# Patient Record
Sex: FEMALE | Race: WHITE | Employment: OTHER | URBAN - NONMETROPOLITAN AREA
[De-identification: names, ages, dates, MRNs, and addresses within clinical notes are randomized per-mention and may not be internally consistent; named-entity substitution may affect disease eponyms.]

---

## 2017-02-27 RX ORDER — LEVOTHYROXINE SODIUM 0.05 MG/1
TABLET ORAL
Qty: 90 TABLET | Refills: 2 | Status: SHIPPED | OUTPATIENT
Start: 2017-02-27 | End: 2017-12-01 | Stop reason: SDUPTHER

## 2017-02-27 RX ORDER — PANTOPRAZOLE SODIUM 40 MG/1
TABLET, DELAYED RELEASE ORAL
Qty: 90 TABLET | Refills: 2 | Status: SHIPPED | OUTPATIENT
Start: 2017-02-27 | End: 2017-03-30 | Stop reason: CLARIF

## 2017-03-30 ENCOUNTER — OFFICE VISIT (OUTPATIENT)
Dept: PRIMARY CARE CLINIC | Age: 72
End: 2017-03-30
Payer: MEDICARE

## 2017-03-30 VITALS
RESPIRATION RATE: 20 BRPM | DIASTOLIC BLOOD PRESSURE: 72 MMHG | BODY MASS INDEX: 33.16 KG/M2 | TEMPERATURE: 96.8 F | SYSTOLIC BLOOD PRESSURE: 126 MMHG | HEART RATE: 76 BPM | WEIGHT: 227.8 LBS

## 2017-03-30 DIAGNOSIS — Z11.59 NEED FOR HEPATITIS C SCREENING TEST: ICD-10-CM

## 2017-03-30 DIAGNOSIS — E03.9 ACQUIRED HYPOTHYROIDISM: Primary | ICD-10-CM

## 2017-03-30 DIAGNOSIS — G62.9 POLYNEUROPATHY: ICD-10-CM

## 2017-03-30 LAB
BASOPHILS ABSOLUTE: 0.1 K/UL (ref 0–0.2)
BASOPHILS RELATIVE PERCENT: 1 % (ref 0–1)
EOSINOPHILS ABSOLUTE: 0.3 K/UL (ref 0–0.6)
EOSINOPHILS RELATIVE PERCENT: 5.8 % (ref 0–5)
HCT VFR BLD CALC: 43.6 % (ref 37–47)
HEMOGLOBIN: 14.6 G/DL (ref 12–16)
LYMPHOCYTES ABSOLUTE: 2 K/UL (ref 1.1–4.5)
LYMPHOCYTES RELATIVE PERCENT: 33.4 % (ref 20–40)
MCH RBC QN AUTO: 31.4 PG (ref 27–31)
MCHC RBC AUTO-ENTMCNC: 33.5 G/DL (ref 33–37)
MCV RBC AUTO: 93.8 FL (ref 81–99)
MONOCYTES ABSOLUTE: 0.5 K/UL (ref 0–0.9)
MONOCYTES RELATIVE PERCENT: 7.6 % (ref 0–10)
NEUTROPHILS ABSOLUTE: 3.1 K/UL (ref 1.5–7.5)
NEUTROPHILS RELATIVE PERCENT: 52.2 % (ref 50–65)
PDW BLD-RTO: 12.7 % (ref 11.5–14.5)
PLATELET # BLD: 207 K/UL (ref 130–400)
PMV BLD AUTO: 11.6 FL (ref 7.4–10.4)
RBC # BLD: 4.65 M/UL (ref 4.2–5.4)
VITAMIN B-12: 592 PG/ML (ref 211–946)
WBC # BLD: 5.9 K/UL (ref 4.8–10.8)

## 2017-03-30 PROCEDURE — 36415 COLL VENOUS BLD VENIPUNCTURE: CPT | Performed by: FAMILY MEDICINE

## 2017-03-30 PROCEDURE — 99214 OFFICE O/P EST MOD 30 MIN: CPT | Performed by: FAMILY MEDICINE

## 2017-03-30 RX ORDER — GABAPENTIN 100 MG/1
100 CAPSULE ORAL NIGHTLY
Qty: 90 CAPSULE | Refills: 3 | Status: SHIPPED | OUTPATIENT
Start: 2017-03-30 | End: 2017-03-30 | Stop reason: SDUPTHER

## 2017-03-30 RX ORDER — GABAPENTIN 100 MG/1
100 CAPSULE ORAL NIGHTLY
Qty: 90 CAPSULE | Refills: 3 | Status: SHIPPED | OUTPATIENT
Start: 2017-03-30 | End: 2017-04-13

## 2017-03-30 ASSESSMENT — ENCOUNTER SYMPTOMS
EYE ITCHING: 0
SINUS PRESSURE: 0
EYE DISCHARGE: 0
EYE REDNESS: 0
COUGH: 0
RHINORRHEA: 0
ABDOMINAL PAIN: 0
BACK PAIN: 1
DIARRHEA: 0
NAUSEA: 0
VOMITING: 0
WHEEZING: 0
COLOR CHANGE: 0
CHEST TIGHTNESS: 0

## 2017-03-31 LAB
ALBUMIN SERPL-MCNC: 4.5 G/DL (ref 3.5–5.2)
ALP BLD-CCNC: 82 U/L (ref 35–104)
ALT SERPL-CCNC: 14 U/L (ref 5–33)
ANION GAP SERPL CALCULATED.3IONS-SCNC: 18 MMOL/L (ref 7–19)
AST SERPL-CCNC: 16 U/L (ref 5–32)
BILIRUB SERPL-MCNC: 0.7 MG/DL (ref 0.2–1.2)
BUN BLDV-MCNC: 16 MG/DL (ref 8–23)
CALCIUM SERPL-MCNC: 9.6 MG/DL (ref 8.8–10.2)
CHLORIDE BLD-SCNC: 103 MMOL/L (ref 98–111)
CO2: 23 MMOL/L (ref 22–29)
CREAT SERPL-MCNC: 0.9 MG/DL (ref 0.5–0.9)
GFR NON-AFRICAN AMERICAN: >60
GLOBULIN: 3.1 G/DL
GLUCOSE BLD-MCNC: 93 MG/DL (ref 74–109)
HEPATITIS C ANTIBODY INTERPRETATION: NORMAL
POTASSIUM SERPL-SCNC: 4.5 MMOL/L (ref 3.5–5)
SODIUM BLD-SCNC: 144 MMOL/L (ref 136–145)
T4 FREE: 1.6 NG/ML (ref 0.9–1.7)
TOTAL PROTEIN: 7.6 G/DL (ref 6.6–8.7)
TSH SERPL DL<=0.05 MIU/L-ACNC: 1.19 UIU/ML (ref 0.27–4.2)

## 2017-04-13 ENCOUNTER — HOSPITAL ENCOUNTER (OUTPATIENT)
Age: 72
Setting detail: SPECIMEN
Discharge: HOME | End: 2017-04-13
Payer: MEDICARE

## 2017-04-13 ENCOUNTER — OFFICE VISIT (OUTPATIENT)
Dept: PRIMARY CARE CLINIC | Age: 72
End: 2017-04-13
Payer: MEDICARE

## 2017-04-13 VITALS
WEIGHT: 227.25 LBS | OXYGEN SATURATION: 96 % | TEMPERATURE: 98 F | SYSTOLIC BLOOD PRESSURE: 120 MMHG | HEIGHT: 70 IN | DIASTOLIC BLOOD PRESSURE: 78 MMHG | HEART RATE: 62 BPM | BODY MASS INDEX: 32.53 KG/M2

## 2017-04-13 DIAGNOSIS — D48.9 NEOPLASM OF UNCERTAIN BEHAVIOR: Primary | ICD-10-CM

## 2017-04-13 DIAGNOSIS — L02.419 ABSCESS OF LEG: ICD-10-CM

## 2017-04-13 DIAGNOSIS — M50.20 HERNIATED CERVICAL DISC: ICD-10-CM

## 2017-04-13 DIAGNOSIS — M54.2 NECK PAIN: ICD-10-CM

## 2017-04-13 DIAGNOSIS — R20.0 ARM NUMBNESS: ICD-10-CM

## 2017-04-13 PROCEDURE — 99214 OFFICE O/P EST MOD 30 MIN: CPT | Performed by: FAMILY MEDICINE

## 2017-04-13 PROCEDURE — 11100 PR BIOPSY OF SKIN LESION: CPT | Performed by: FAMILY MEDICINE

## 2017-04-13 PROCEDURE — 88305 TISSUE EXAM BY PATHOLOGIST: CPT

## 2017-04-13 RX ORDER — ACETAMINOPHEN AND CODEINE PHOSPHATE 300; 30 MG/1; MG/1
1 TABLET ORAL EVERY 8 HOURS PRN
Qty: 45 TABLET | Refills: 0 | Status: SHIPPED | OUTPATIENT
Start: 2017-04-13 | End: 2017-08-08

## 2017-04-13 ASSESSMENT — ENCOUNTER SYMPTOMS
COUGH: 0
EYE DISCHARGE: 0
DIARRHEA: 0
VOMITING: 0
COLOR CHANGE: 0
WHEEZING: 0
BACK PAIN: 1
NAUSEA: 0
ABDOMINAL PAIN: 0

## 2017-04-20 ENCOUNTER — NURSE ONLY (OUTPATIENT)
Dept: PRIMARY CARE CLINIC | Age: 72
End: 2017-04-20

## 2017-04-20 DIAGNOSIS — M54.2 NECK PAIN: Primary | ICD-10-CM

## 2017-04-20 PROCEDURE — 99999 PR OFFICE/OUTPT VISIT,PROCEDURE ONLY: CPT | Performed by: FAMILY MEDICINE

## 2017-04-20 RX ORDER — TRAMADOL HYDROCHLORIDE 50 MG/1
50 TABLET ORAL EVERY 8 HOURS PRN
Qty: 30 TABLET | Refills: 0 | Status: SHIPPED | OUTPATIENT
Start: 2017-04-20 | End: 2017-05-30 | Stop reason: SDUPTHER

## 2017-05-31 RX ORDER — TRAMADOL HYDROCHLORIDE 50 MG/1
50 TABLET ORAL EVERY 8 HOURS PRN
Qty: 30 TABLET | Refills: 0 | Status: SHIPPED | OUTPATIENT
Start: 2017-05-31 | End: 2017-07-19 | Stop reason: SDUPTHER

## 2017-07-19 RX ORDER — TRAMADOL HYDROCHLORIDE 50 MG/1
50 TABLET ORAL EVERY 8 HOURS PRN
Qty: 30 TABLET | Refills: 0 | Status: SHIPPED | OUTPATIENT
Start: 2017-07-19 | End: 2017-07-29

## 2017-08-08 ENCOUNTER — OFFICE VISIT (OUTPATIENT)
Dept: PRIMARY CARE CLINIC | Age: 72
End: 2017-08-08
Payer: MEDICARE

## 2017-08-08 VITALS
HEIGHT: 70 IN | SYSTOLIC BLOOD PRESSURE: 123 MMHG | HEART RATE: 64 BPM | BODY MASS INDEX: 33.18 KG/M2 | TEMPERATURE: 98.1 F | DIASTOLIC BLOOD PRESSURE: 78 MMHG | WEIGHT: 231.8 LBS | RESPIRATION RATE: 16 BRPM

## 2017-08-08 DIAGNOSIS — Z01.419 WELL FEMALE EXAM WITH ROUTINE GYNECOLOGICAL EXAM: Primary | ICD-10-CM

## 2017-08-08 PROCEDURE — 99214 OFFICE O/P EST MOD 30 MIN: CPT | Performed by: FAMILY MEDICINE

## 2017-08-08 RX ORDER — AMITRIPTYLINE HYDROCHLORIDE 25 MG/1
TABLET, FILM COATED ORAL
Qty: 60 TABLET | Refills: 3 | Status: SHIPPED | OUTPATIENT
Start: 2017-08-08 | End: 2018-02-14 | Stop reason: SDUPTHER

## 2017-08-08 RX ORDER — TRAMADOL HYDROCHLORIDE 50 MG/1
50 TABLET ORAL NIGHTLY
COMMUNITY
End: 2018-02-14 | Stop reason: SDUPTHER

## 2017-08-08 ASSESSMENT — PATIENT HEALTH QUESTIONNAIRE - PHQ9
SUM OF ALL RESPONSES TO PHQ QUESTIONS 1-9: 0
SUM OF ALL RESPONSES TO PHQ9 QUESTIONS 1 & 2: 0
2. FEELING DOWN, DEPRESSED OR HOPELESS: 0
1. LITTLE INTEREST OR PLEASURE IN DOING THINGS: 0

## 2017-12-01 RX ORDER — PANTOPRAZOLE SODIUM 40 MG/1
TABLET, DELAYED RELEASE ORAL
Qty: 90 TABLET | Refills: 2 | Status: SHIPPED | OUTPATIENT
Start: 2017-12-01 | End: 2018-08-03 | Stop reason: SDUPTHER

## 2017-12-01 RX ORDER — LEVOTHYROXINE SODIUM 0.05 MG/1
TABLET ORAL
Qty: 90 TABLET | Refills: 2 | Status: SHIPPED | OUTPATIENT
Start: 2017-12-01 | End: 2018-08-03 | Stop reason: SDUPTHER

## 2018-02-14 ENCOUNTER — OFFICE VISIT (OUTPATIENT)
Dept: PRIMARY CARE CLINIC | Age: 73
End: 2018-02-14
Payer: MEDICARE

## 2018-02-14 VITALS
OXYGEN SATURATION: 98 % | WEIGHT: 241 LBS | HEART RATE: 68 BPM | TEMPERATURE: 98 F | HEIGHT: 70 IN | SYSTOLIC BLOOD PRESSURE: 128 MMHG | RESPIRATION RATE: 20 BRPM | DIASTOLIC BLOOD PRESSURE: 88 MMHG | BODY MASS INDEX: 34.5 KG/M2

## 2018-02-14 DIAGNOSIS — G62.9 NEUROPATHY: Primary | ICD-10-CM

## 2018-02-14 DIAGNOSIS — G89.29 CHRONIC BILATERAL LOW BACK PAIN WITH BILATERAL SCIATICA: ICD-10-CM

## 2018-02-14 DIAGNOSIS — M54.41 CHRONIC BILATERAL LOW BACK PAIN WITH BILATERAL SCIATICA: ICD-10-CM

## 2018-02-14 DIAGNOSIS — M54.42 CHRONIC BILATERAL LOW BACK PAIN WITH BILATERAL SCIATICA: ICD-10-CM

## 2018-02-14 PROCEDURE — 1123F ACP DISCUSS/DSCN MKR DOCD: CPT | Performed by: FAMILY MEDICINE

## 2018-02-14 PROCEDURE — 4040F PNEUMOC VAC/ADMIN/RCVD: CPT | Performed by: FAMILY MEDICINE

## 2018-02-14 PROCEDURE — G8417 CALC BMI ABV UP PARAM F/U: HCPCS | Performed by: FAMILY MEDICINE

## 2018-02-14 PROCEDURE — G8484 FLU IMMUNIZE NO ADMIN: HCPCS | Performed by: FAMILY MEDICINE

## 2018-02-14 PROCEDURE — 3014F SCREEN MAMMO DOC REV: CPT | Performed by: FAMILY MEDICINE

## 2018-02-14 PROCEDURE — 1090F PRES/ABSN URINE INCON ASSESS: CPT | Performed by: FAMILY MEDICINE

## 2018-02-14 PROCEDURE — G8399 PT W/DXA RESULTS DOCUMENT: HCPCS | Performed by: FAMILY MEDICINE

## 2018-02-14 PROCEDURE — 99214 OFFICE O/P EST MOD 30 MIN: CPT | Performed by: FAMILY MEDICINE

## 2018-02-14 PROCEDURE — G8427 DOCREV CUR MEDS BY ELIG CLIN: HCPCS | Performed by: FAMILY MEDICINE

## 2018-02-14 PROCEDURE — 3017F COLORECTAL CA SCREEN DOC REV: CPT | Performed by: FAMILY MEDICINE

## 2018-02-14 PROCEDURE — 1036F TOBACCO NON-USER: CPT | Performed by: FAMILY MEDICINE

## 2018-02-14 RX ORDER — AMITRIPTYLINE HYDROCHLORIDE 25 MG/1
TABLET, FILM COATED ORAL
Qty: 60 TABLET | Refills: 3 | Status: SHIPPED | OUTPATIENT
Start: 2018-02-14 | End: 2018-08-06

## 2018-02-14 RX ORDER — TRAMADOL HYDROCHLORIDE 50 MG/1
50 TABLET ORAL EVERY 8 HOURS PRN
Qty: 45 TABLET | Refills: 0 | Status: SHIPPED | OUTPATIENT
Start: 2018-02-14 | End: 2018-03-16

## 2018-02-15 ASSESSMENT — ENCOUNTER SYMPTOMS
EYE DISCHARGE: 0
COUGH: 0
DIARRHEA: 0
WHEEZING: 0
COLOR CHANGE: 0
ABDOMINAL PAIN: 0
VOMITING: 0
BACK PAIN: 1
NAUSEA: 0

## 2018-02-20 ENCOUNTER — TELEPHONE (OUTPATIENT)
Dept: NEUROLOGY | Age: 73
End: 2018-02-20

## 2018-03-21 ENCOUNTER — OFFICE VISIT (OUTPATIENT)
Dept: NEUROLOGY | Age: 73
End: 2018-03-21
Payer: MEDICARE

## 2018-03-21 VITALS
HEIGHT: 69 IN | BODY MASS INDEX: 35.57 KG/M2 | HEART RATE: 72 BPM | SYSTOLIC BLOOD PRESSURE: 117 MMHG | DIASTOLIC BLOOD PRESSURE: 74 MMHG | WEIGHT: 240.13 LBS

## 2018-03-21 DIAGNOSIS — M54.5 LOW BACK PAIN, UNSPECIFIED BACK PAIN LATERALITY, UNSPECIFIED CHRONICITY, WITH SCIATICA PRESENCE UNSPECIFIED: ICD-10-CM

## 2018-03-21 DIAGNOSIS — G62.9 NEUROPATHY: Primary | ICD-10-CM

## 2018-03-21 DIAGNOSIS — G62.9 NEUROPATHY: ICD-10-CM

## 2018-03-21 DIAGNOSIS — E53.9 BURNING FEET SYNDROME: ICD-10-CM

## 2018-03-21 DIAGNOSIS — M54.2 PAIN, NECK: ICD-10-CM

## 2018-03-21 LAB
RHEUMATOID FACTOR: <10 IU/ML
SEDIMENTATION RATE, ERYTHROCYTE: 17 MM/HR (ref 0–25)
VITAMIN B-12: 534 PG/ML (ref 211–946)

## 2018-03-21 PROCEDURE — 1036F TOBACCO NON-USER: CPT | Performed by: PSYCHIATRY & NEUROLOGY

## 2018-03-21 PROCEDURE — G8399 PT W/DXA RESULTS DOCUMENT: HCPCS | Performed by: PSYCHIATRY & NEUROLOGY

## 2018-03-21 PROCEDURE — 99204 OFFICE O/P NEW MOD 45 MIN: CPT | Performed by: PSYCHIATRY & NEUROLOGY

## 2018-03-21 PROCEDURE — 3017F COLORECTAL CA SCREEN DOC REV: CPT | Performed by: PSYCHIATRY & NEUROLOGY

## 2018-03-21 PROCEDURE — 1123F ACP DISCUSS/DSCN MKR DOCD: CPT | Performed by: PSYCHIATRY & NEUROLOGY

## 2018-03-21 PROCEDURE — G8484 FLU IMMUNIZE NO ADMIN: HCPCS | Performed by: PSYCHIATRY & NEUROLOGY

## 2018-03-21 PROCEDURE — 4040F PNEUMOC VAC/ADMIN/RCVD: CPT | Performed by: PSYCHIATRY & NEUROLOGY

## 2018-03-21 PROCEDURE — G8427 DOCREV CUR MEDS BY ELIG CLIN: HCPCS | Performed by: PSYCHIATRY & NEUROLOGY

## 2018-03-21 PROCEDURE — 1090F PRES/ABSN URINE INCON ASSESS: CPT | Performed by: PSYCHIATRY & NEUROLOGY

## 2018-03-21 PROCEDURE — 3014F SCREEN MAMMO DOC REV: CPT | Performed by: PSYCHIATRY & NEUROLOGY

## 2018-03-21 PROCEDURE — G8417 CALC BMI ABV UP PARAM F/U: HCPCS | Performed by: PSYCHIATRY & NEUROLOGY

## 2018-03-21 RX ORDER — TRAMADOL HYDROCHLORIDE 50 MG/1
50 TABLET ORAL EVERY 6 HOURS PRN
COMMUNITY
End: 2018-04-04 | Stop reason: SDUPTHER

## 2018-03-21 RX ORDER — OXCARBAZEPINE 150 MG/1
TABLET, FILM COATED ORAL
Qty: 120 TABLET | Refills: 5 | Status: SHIPPED | OUTPATIENT
Start: 2018-03-21 | End: 2018-08-06

## 2018-03-21 NOTE — PROGRESS NOTES
Chief Complaint   Patient presents with    New Patient     Referred by Dr. Anurag Sandhu for evaluation of neuropathy        Angel Workman is a 74y.o. year old female who is seen for evaluation of Burning feet. This has been present for about 5 years or more. It is typically worse at night when she lays down. She sometimes feels that the nerve endings to her body are not normal. She does have some chronic neck pain issues and some occasional low back pain issues. She denies any weakness. She indicates her mother and diabetes and neuropathy. She denies any exposure to chemicals, toxins, or pesticides. She has tried Cymbalta or amitriptyline in the past. Tramadol does help.      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    Pain, neck 03/21/2018     Resolved Ambulatory Problems     Diagnosis Date Noted    No Resolved Ambulatory Problems     Past Medical History:   Diagnosis Date    Back pain     GERD (gastroesophageal reflux disease)     Hypertension     Hypothyroidism     Xerosis of skin        Past Surgical History:   Procedure Laterality Date    CHOLECYSTECTOMY      COLONOSCOPY      1-09    TUBAL LIGATION         Family History   Problem Relation Age of Onset    Diabetes Mother     High Blood Pressure Mother     Alzheimer's Disease Mother     Alzheimer's Disease Father     Cancer Sister      colon       Allergies   Allergen Reactions    Neurontin [Gabapentin] Hives and Itching       Social History     Social History    Marital status:      Spouse name: N/A    Number of children: 1    Years of education: N/A     Occupational History    retired       Social History Main Topics    facility-administered medications for this visit. /74   Pulse 72   Ht 5' 9\" (1.753 m)   Wt 240 lb 2 oz (108.9 kg)   BMI 35.46 kg/m²     Constitutional  well developed, well nourished. Eyes  conjunctiva normal.  Pupils react to light  Ear, nose, throat -hearing intact to finger rub No scars, masses, or lesions over external nose or ears, no atrophy of tongue  Neck-symmetric, no masses noted, no jugular vein distension  Respiration- chest wall appears symmetric, good expansion,   normal effort without use of accessory muscles  Musculoskeletal  no significant wasting of muscles noted, no bony deformities  Extremities-no clubbing, cyanosis or edema  Skin  warm, dry, and intact. No rash, erythema, or pallor.   Psychiatric  mood, affect, and behavior appear normal.      Neurological exam  Awake, alert, fluent oriented x 3 appropriate affect  Attention and concentration appear appropriate  Recent and remote memory appears unremarkable  Speech normal without dysarthria  No clear issues with language of fund of knowledge    Cranial Nerve Exam   CN II- Visual fields grossly unremarkable  CN III, IV,VI-EOMI, No nystagmus, conjugate eye movements, no ptosis  CN V-sensation intact to LT over face  CN VII-no facial assymetry  CN VIII-Hearing intact to finger rub  CN IX and X- Palate elevates in midline  CN XI-good shoulder shrug  CN XII-Tongue midline with no fasciculations or fibrillations    Motor Exam  V/V throughout upper and lower extremities bilaterally, no cogwheeling, normal tone    Sensory Exam  Sensation intact to light touch and temperature upper and lower extremities bilaterally    Reflexes   2+ biceps bilaterally  2+ brachioradialis  2+patella  0 ankle jerks  No clonus ankles  No Anderson's sign bilateral hands    Tremors- no tremors in hands or head noted    Gait  Normal base and speed  No ataxia    Coordination  Finger to nose-unremarkable    Lab Results   Component Value Date    [id number] 592 03/30/2017     Lab Results   Component Value Date    WBC 5.9 03/30/2017    HGB 14.6 03/30/2017    HCT 43.6 03/30/2017    MCV 93.8 03/30/2017     03/30/2017     Lab Results   Component Value Date     03/30/2017    K 4.5 03/30/2017     03/30/2017    CO2 23 03/30/2017    BUN 16 03/30/2017    CREATININE 0.9 03/30/2017    GLUCOSE 93 03/30/2017    CALCIUM 9.6 03/30/2017    PROT 7.6 03/30/2017    LABALBU 4.5 03/30/2017    BILITOT 0.7 03/30/2017    ALKPHOS 82 03/30/2017    AST 16 03/30/2017    ALT 14 03/30/2017    LABGLOM >60 03/30/2017    GLOB 3.1 03/30/2017           Assessment    ICD-10-CM ICD-9-CM    1. [facility]athy [facility] G62.9 355.9 B. Burgdorferi Antibodies by WB      Celiac Reflex Panel      MONTSERRAT 2 - Anti SSA & Anti SSB      Gliadin Antibody, IgA      Heavy Metal Blood Panel      LYME (B.BURGDORFERI) PCR      Methylmalonic Acid, Serum      Electrophoresis Protein, Serum without Reflex to Immunofixation      Rheumatoid Factor      Sedimentation Rate      Vitamin B12      Nerve Conduction Test with EMG   2. Burning feet syndrome E53.9 266.2 B. Burgdorferi Antibodies by WB      Celiac Reflex Panel      MONTSERRAT 2 - Anti SSA & Anti SSB      Gliadin Antibody, IgA      Heavy Metal Blood Panel      LYME (B.BURGDORFERI) PCR      Methylmalonic Acid, Serum      Electrophoresis Protein, Serum without Reflex to Immunofixation      Rheumatoid Factor      Sedimentation Rate      Vitamin B12      Nerve Conduction Test with EMG   3. Low back pain, unspecified back pain laterality, unspecified chronicity, with sciatica presence unspecified M54.5 724.2 B.  Burgdorferi Antibodies by WB      Celiac Reflex Panel      MONTSERRAT 2 - Anti SSA & Anti SSB      Gliadin Antibody, IgA      Heavy Metal Blood Panel      LYME (B.BURGDORFERI) PCR      Methylmalonic Acid, Serum      Electrophoresis Protein, Serum without Reflex to Immunofixation      Rheumatoid Factor      Sedimentation Rate      Vitamin B12      Nerve Conduction Test with

## 2018-03-23 LAB
GLIADIN ANTIBODIES IGA: 6 UNITS (ref 0–19)
GLIADIN ANTIBODIES IGG: 2 UNITS (ref 0–19)
IGA: 332 MG/DL (ref 68–408)
LYME, EIA: 0.18 LIV (ref 0–1.2)

## 2018-03-24 LAB
ALBUMIN SERPL-MCNC: 4.09 G/DL (ref 3.75–5.01)
ALPHA-1-GLOBULIN: 0.35 G/DL (ref 0.19–0.46)
ALPHA-2-GLOBULIN: 0.79 G/DL (ref 0.48–1.05)
ARSENIC BLOOD: <10 UG/L (ref 0–13)
BETA GLOBULIN: 1.04 G/DL (ref 0.48–1.1)
ENA TO SSB (LA) ANTIBODY: 0 AU/ML (ref 0–40)
GAMMA GLOBULIN: 1.24 G/DL (ref 0.62–1.51)
LEAD LEVEL BLOOD: <2 UG/DL (ref 0–4.9)
MERCURY BLOOD: <3 UG/L (ref 0–10)
PROTEIN ELECTROPHORESIS, SERUM: NORMAL
SPE/IFE INTERPRETATION: NORMAL
SSA 52 (RO) (ENA) AB, IGG: 6 AU/ML (ref 0–40)
SSA 60 (RO) (ENA) AB, IGG: 1 AU/ML (ref 0–40)
TISSUE TRANSGLUTAMINASE IGA: 0 U/ML (ref 0–3)
TOTAL PROTEIN: 7.5 G/DL (ref 6–8.3)

## 2018-03-25 LAB — METHYLMALONIC ACID: 0.24 UMOL/L (ref 0–0.4)

## 2018-04-02 ENCOUNTER — HOSPITAL ENCOUNTER (OUTPATIENT)
Dept: NEUROLOGY | Age: 73
Discharge: HOME | End: 2018-04-02
Payer: MEDICARE

## 2018-04-02 DIAGNOSIS — G62.9 NEUROPATHY: ICD-10-CM

## 2018-04-02 DIAGNOSIS — E53.9 BURNING FEET SYNDROME: ICD-10-CM

## 2018-04-02 DIAGNOSIS — M54.5 LOW BACK PAIN, UNSPECIFIED BACK PAIN LATERALITY, UNSPECIFIED CHRONICITY, WITH SCIATICA PRESENCE UNSPECIFIED: ICD-10-CM

## 2018-04-02 PROCEDURE — 95886 MUSC TEST DONE W/N TEST COMP: CPT | Performed by: PSYCHIATRY & NEUROLOGY

## 2018-04-02 PROCEDURE — 95909 NRV CNDJ TST 5-6 STUDIES: CPT

## 2018-04-02 PROCEDURE — 95886 MUSC TEST DONE W/N TEST COMP: CPT

## 2018-04-02 PROCEDURE — 95909 NRV CNDJ TST 5-6 STUDIES: CPT | Performed by: PSYCHIATRY & NEUROLOGY

## 2018-04-04 RX ORDER — TRAMADOL HYDROCHLORIDE 50 MG/1
50 TABLET ORAL EVERY 6 HOURS PRN
Qty: 30 TABLET | Refills: 0 | Status: SHIPPED | OUTPATIENT
Start: 2018-04-04 | End: 2018-05-02 | Stop reason: SDUPTHER

## 2018-05-02 ENCOUNTER — OFFICE VISIT (OUTPATIENT)
Dept: NEUROLOGY | Age: 73
End: 2018-05-02
Payer: MEDICARE

## 2018-05-02 VITALS
HEART RATE: 78 BPM | WEIGHT: 240 LBS | SYSTOLIC BLOOD PRESSURE: 113 MMHG | DIASTOLIC BLOOD PRESSURE: 76 MMHG | BODY MASS INDEX: 35.55 KG/M2 | HEIGHT: 69 IN

## 2018-05-02 DIAGNOSIS — M54.5 LOW BACK PAIN, UNSPECIFIED BACK PAIN LATERALITY, UNSPECIFIED CHRONICITY, WITH SCIATICA PRESENCE UNSPECIFIED: ICD-10-CM

## 2018-05-02 DIAGNOSIS — M54.2 PAIN, NECK: ICD-10-CM

## 2018-05-02 DIAGNOSIS — G62.9 NEUROPATHY: Primary | ICD-10-CM

## 2018-05-02 DIAGNOSIS — E53.9 BURNING FEET SYNDROME: ICD-10-CM

## 2018-05-02 PROCEDURE — G8399 PT W/DXA RESULTS DOCUMENT: HCPCS | Performed by: PSYCHIATRY & NEUROLOGY

## 2018-05-02 PROCEDURE — 1123F ACP DISCUSS/DSCN MKR DOCD: CPT | Performed by: PSYCHIATRY & NEUROLOGY

## 2018-05-02 PROCEDURE — G8417 CALC BMI ABV UP PARAM F/U: HCPCS | Performed by: PSYCHIATRY & NEUROLOGY

## 2018-05-02 PROCEDURE — 4040F PNEUMOC VAC/ADMIN/RCVD: CPT | Performed by: PSYCHIATRY & NEUROLOGY

## 2018-05-02 PROCEDURE — 3017F COLORECTAL CA SCREEN DOC REV: CPT | Performed by: PSYCHIATRY & NEUROLOGY

## 2018-05-02 PROCEDURE — 99214 OFFICE O/P EST MOD 30 MIN: CPT | Performed by: PSYCHIATRY & NEUROLOGY

## 2018-05-02 PROCEDURE — 1036F TOBACCO NON-USER: CPT | Performed by: PSYCHIATRY & NEUROLOGY

## 2018-05-02 PROCEDURE — G8427 DOCREV CUR MEDS BY ELIG CLIN: HCPCS | Performed by: PSYCHIATRY & NEUROLOGY

## 2018-05-02 PROCEDURE — 1090F PRES/ABSN URINE INCON ASSESS: CPT | Performed by: PSYCHIATRY & NEUROLOGY

## 2018-05-02 RX ORDER — GABAPENTIN 300 MG/1
300 CAPSULE ORAL 3 TIMES DAILY
Qty: 90 CAPSULE | Refills: 5 | Status: SHIPPED | OUTPATIENT
Start: 2018-05-02 | End: 2018-08-06

## 2018-05-02 RX ORDER — TRAMADOL HYDROCHLORIDE 50 MG/1
50 TABLET ORAL EVERY 6 HOURS PRN
Qty: 30 TABLET | Refills: 0 | Status: SHIPPED | OUTPATIENT
Start: 2018-05-02 | End: 2018-06-06 | Stop reason: SDUPTHER

## 2018-05-07 RX ORDER — LISINOPRIL 10 MG/1
TABLET ORAL
Qty: 90 TABLET | Refills: 2 | Status: SHIPPED | OUTPATIENT
Start: 2018-05-07 | End: 2019-02-09 | Stop reason: SDUPTHER

## 2018-06-06 DIAGNOSIS — G44.89 OTHER HEADACHE SYNDROME: Primary | ICD-10-CM

## 2018-06-06 RX ORDER — TRAMADOL HYDROCHLORIDE 50 MG/1
50 TABLET ORAL EVERY 6 HOURS PRN
Qty: 30 TABLET | Refills: 0 | Status: SHIPPED | OUTPATIENT
Start: 2018-06-06 | End: 2018-08-06 | Stop reason: SDUPTHER

## 2018-08-06 ENCOUNTER — OFFICE VISIT (OUTPATIENT)
Dept: NEUROLOGY | Age: 73
End: 2018-08-06
Payer: MEDICARE

## 2018-08-06 VITALS
BODY MASS INDEX: 35.55 KG/M2 | HEIGHT: 69 IN | HEART RATE: 63 BPM | DIASTOLIC BLOOD PRESSURE: 75 MMHG | SYSTOLIC BLOOD PRESSURE: 121 MMHG | WEIGHT: 240 LBS

## 2018-08-06 DIAGNOSIS — M54.5 LOW BACK PAIN, UNSPECIFIED BACK PAIN LATERALITY, UNSPECIFIED CHRONICITY, WITH SCIATICA PRESENCE UNSPECIFIED: ICD-10-CM

## 2018-08-06 DIAGNOSIS — G62.9 NEUROPATHY: Primary | ICD-10-CM

## 2018-08-06 DIAGNOSIS — G44.89 OTHER HEADACHE SYNDROME: ICD-10-CM

## 2018-08-06 DIAGNOSIS — M54.2 PAIN, NECK: ICD-10-CM

## 2018-08-06 DIAGNOSIS — E53.9 BURNING FEET SYNDROME: ICD-10-CM

## 2018-08-06 PROCEDURE — 3017F COLORECTAL CA SCREEN DOC REV: CPT | Performed by: PSYCHIATRY & NEUROLOGY

## 2018-08-06 PROCEDURE — 4040F PNEUMOC VAC/ADMIN/RCVD: CPT | Performed by: PSYCHIATRY & NEUROLOGY

## 2018-08-06 PROCEDURE — G8399 PT W/DXA RESULTS DOCUMENT: HCPCS | Performed by: PSYCHIATRY & NEUROLOGY

## 2018-08-06 PROCEDURE — 1036F TOBACCO NON-USER: CPT | Performed by: PSYCHIATRY & NEUROLOGY

## 2018-08-06 PROCEDURE — 1101F PT FALLS ASSESS-DOCD LE1/YR: CPT | Performed by: PSYCHIATRY & NEUROLOGY

## 2018-08-06 PROCEDURE — 99214 OFFICE O/P EST MOD 30 MIN: CPT | Performed by: PSYCHIATRY & NEUROLOGY

## 2018-08-06 PROCEDURE — 1090F PRES/ABSN URINE INCON ASSESS: CPT | Performed by: PSYCHIATRY & NEUROLOGY

## 2018-08-06 PROCEDURE — G8427 DOCREV CUR MEDS BY ELIG CLIN: HCPCS | Performed by: PSYCHIATRY & NEUROLOGY

## 2018-08-06 PROCEDURE — G8417 CALC BMI ABV UP PARAM F/U: HCPCS | Performed by: PSYCHIATRY & NEUROLOGY

## 2018-08-06 PROCEDURE — 1123F ACP DISCUSS/DSCN MKR DOCD: CPT | Performed by: PSYCHIATRY & NEUROLOGY

## 2018-08-06 RX ORDER — TRAMADOL HYDROCHLORIDE 50 MG/1
50 TABLET ORAL EVERY 6 HOURS PRN
Qty: 30 TABLET | Refills: 5 | Status: SHIPPED | OUTPATIENT
Start: 2018-08-06 | End: 2019-02-01 | Stop reason: SDUPTHER

## 2018-08-06 NOTE — PROGRESS NOTES
Chief Complaint   Patient presents with    Follow-up     2 month follow up for neuropathy        Amelie Loya is a 74y.o. year old female who is seen for evaluation of Burning feet. This has been present for about 5 years or more. It is typically worse at night when she lays down. She sometimes feels that the nerve endings to her body are not normal. She does have some chronic neck pain issues and some occasional low back pain issues. She denies any weakness. She indicates her mother and diabetes and neuropathy. She denies any exposure to chemicals, toxins, or pesticides. She has tried Cymbalta or amitriptyline in the past. Tramadol does help. Working more in yard and more back pain. Stretching helps. Did not tolerate trileptal. Going to chiropractor helps neck and back. Gabapentin made her sick. Tramadol still helps.     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/2018    Pain, neck 03/21/2018     Resolved Ambulatory Problems     Diagnosis Date Noted    No Resolved Ambulatory Problems     Past Medical History:   Diagnosis Date    Back pain     GERD (gastroesophageal reflux disease)     Hypertension     Hypothyroidism     Xerosis of skin        Past Surgical History:   Procedure Laterality Date    CHOLECYSTECTOMY      COLONOSCOPY      1-09    TUBAL LIGATION         Family History   Problem Relation Age of Onset    Diabetes Mother     High Blood Pressure Mother     Alzheimer's Disease Mother     Alzheimer's Disease Father     Cancer Sister         colon       Allergies   Allergen Reactions    Neurontin [Gabapentin] Hives and Itching       Social History     Social History    Marital status:      Spouse name: N/A   Venkata Bates

## 2018-08-17 ENCOUNTER — OFFICE VISIT (OUTPATIENT)
Dept: PRIMARY CARE CLINIC | Age: 73
End: 2018-08-17
Payer: MEDICARE

## 2018-08-17 VITALS
HEIGHT: 69 IN | OXYGEN SATURATION: 97 % | WEIGHT: 239 LBS | RESPIRATION RATE: 16 BRPM | DIASTOLIC BLOOD PRESSURE: 84 MMHG | SYSTOLIC BLOOD PRESSURE: 120 MMHG | TEMPERATURE: 97.9 F | HEART RATE: 64 BPM | BODY MASS INDEX: 35.4 KG/M2

## 2018-08-17 DIAGNOSIS — Z00.00 ROUTINE GENERAL MEDICAL EXAMINATION AT A HEALTH CARE FACILITY: Primary | ICD-10-CM

## 2018-08-17 DIAGNOSIS — E03.9 ACQUIRED HYPOTHYROIDISM: ICD-10-CM

## 2018-08-17 DIAGNOSIS — I10 ESSENTIAL HYPERTENSION: ICD-10-CM

## 2018-08-17 DIAGNOSIS — Z12.31 ENCOUNTER FOR SCREENING MAMMOGRAM FOR BREAST CANCER: ICD-10-CM

## 2018-08-17 DIAGNOSIS — E55.9 VITAMIN D DEFICIENCY: ICD-10-CM

## 2018-08-17 LAB
ALBUMIN SERPL-MCNC: 4.4 G/DL (ref 3.5–5.2)
ALP BLD-CCNC: 69 U/L (ref 35–104)
ALT SERPL-CCNC: 19 U/L (ref 5–33)
ANION GAP SERPL CALCULATED.3IONS-SCNC: 16 MMOL/L (ref 7–19)
AST SERPL-CCNC: 22 U/L (ref 5–32)
BILIRUB SERPL-MCNC: 0.6 MG/DL (ref 0.2–1.2)
BUN BLDV-MCNC: 20 MG/DL (ref 8–23)
CALCIUM SERPL-MCNC: 9.7 MG/DL (ref 8.8–10.2)
CHLORIDE BLD-SCNC: 102 MMOL/L (ref 98–111)
CHOLESTEROL, TOTAL: 183 MG/DL (ref 160–199)
CO2: 25 MMOL/L (ref 22–29)
CREAT SERPL-MCNC: 1 MG/DL (ref 0.5–0.9)
GFR NON-AFRICAN AMERICAN: 54
GLUCOSE BLD-MCNC: 86 MG/DL (ref 74–109)
HDLC SERPL-MCNC: 69 MG/DL (ref 65–121)
LDL CHOLESTEROL CALCULATED: 100 MG/DL
POTASSIUM SERPL-SCNC: 4.5 MMOL/L (ref 3.5–5)
SODIUM BLD-SCNC: 143 MMOL/L (ref 136–145)
T4 TOTAL: 11.4 UG/DL (ref 4.5–11.7)
TOTAL PROTEIN: 7.9 G/DL (ref 6.6–8.7)
TRIGL SERPL-MCNC: 69 MG/DL (ref 0–149)
TSH SERPL DL<=0.05 MIU/L-ACNC: 2.05 UIU/ML (ref 0.27–4.2)
VITAMIN D 25-HYDROXY: 38.2 NG/ML

## 2018-08-17 PROCEDURE — 3017F COLORECTAL CA SCREEN DOC REV: CPT | Performed by: FAMILY MEDICINE

## 2018-08-17 PROCEDURE — 1090F PRES/ABSN URINE INCON ASSESS: CPT | Performed by: FAMILY MEDICINE

## 2018-08-17 PROCEDURE — G0439 PPPS, SUBSEQ VISIT: HCPCS | Performed by: FAMILY MEDICINE

## 2018-08-17 PROCEDURE — 99213 OFFICE O/P EST LOW 20 MIN: CPT | Performed by: FAMILY MEDICINE

## 2018-08-17 PROCEDURE — G8417 CALC BMI ABV UP PARAM F/U: HCPCS | Performed by: FAMILY MEDICINE

## 2018-08-17 PROCEDURE — 36415 COLL VENOUS BLD VENIPUNCTURE: CPT | Performed by: FAMILY MEDICINE

## 2018-08-17 PROCEDURE — 1036F TOBACCO NON-USER: CPT | Performed by: FAMILY MEDICINE

## 2018-08-17 PROCEDURE — G8399 PT W/DXA RESULTS DOCUMENT: HCPCS | Performed by: FAMILY MEDICINE

## 2018-08-17 PROCEDURE — 1101F PT FALLS ASSESS-DOCD LE1/YR: CPT | Performed by: FAMILY MEDICINE

## 2018-08-17 PROCEDURE — 1123F ACP DISCUSS/DSCN MKR DOCD: CPT | Performed by: FAMILY MEDICINE

## 2018-08-17 PROCEDURE — G8427 DOCREV CUR MEDS BY ELIG CLIN: HCPCS | Performed by: FAMILY MEDICINE

## 2018-08-17 PROCEDURE — 4040F PNEUMOC VAC/ADMIN/RCVD: CPT | Performed by: FAMILY MEDICINE

## 2018-08-17 ASSESSMENT — PATIENT HEALTH QUESTIONNAIRE - PHQ9
SUM OF ALL RESPONSES TO PHQ QUESTIONS 1-9: 0
SUM OF ALL RESPONSES TO PHQ QUESTIONS 1-9: 0

## 2018-08-17 ASSESSMENT — LIFESTYLE VARIABLES: HOW OFTEN DO YOU HAVE A DRINK CONTAINING ALCOHOL: 0

## 2018-08-17 ASSESSMENT — ANXIETY QUESTIONNAIRES: GAD7 TOTAL SCORE: 0

## 2018-08-17 NOTE — PROGRESS NOTES
Medicare Annual Wellness Visit  Name: Mahamed Bee Date: 2018   MRN: [id number] Sex: Female   Age: 74 y.o. Ethnicity: Non-/Non    : 1945 Race: Esme Shown is here for Medicare AWV (pt is fasting, difficulty sleeping with her back, has seen DR Sahra Wallace she has peripheral neuropathy in her feet has tried several meds but all have made her sick )    Screenings for behavioral, psychosocial and functional/safety risks, and cognitive dysfunction are all negative except as indicated below. These results, as well as other patient data from the [address] form, are documented in Flowsheets linked to this Encounter. Allergies   Allergen Reactions    Neurontin [Gabapentin] Hives and Itching     Prior to Visit Medications    Medication Sig Taking? Authorizing Provider   traMADol (ULTRAM) 50 MG tablet Take 1 tablet by mouth every 6 hours as needed for Pain for up to 30 days. Kb Feldman MD   levothyroxine (SYNTHROID) 50 MCG tablet 1 tab po q day for low thyroid Yes Scot De Luna MD   lisinopril (PRINIVIL;ZESTRIL) 10 MG tablet TAKE 1 TABLET DAILY FOR HIGH BLOOD PRESSURE Yes Scot De Luna MD   esomeprazole (NEXIUM) 40 MG delayed release capsule Take 1 capsule by mouth daily Stop protonix Yes DA Aranda CNP   Vitamin D (CHOLECALCIFEROL) 1000 UNITS CAPS capsule Take 1,000 Units by mouth daily Yes Historical Provider, MD     Past Medical History:   Diagnosis Date    Back pain     GERD (gastroesophageal reflux disease)     Hypertension     Hypothyroidism     thyroid nodule    Xerosis of skin      Past Surgical History:   Procedure Laterality Date    CHOLECYSTECTOMY      COLONOSCOPY          TUBAL LIGATION       Family History   Problem Relation Age of Onset    Diabetes Mother     High Blood Pressure Mother     Alzheimer's Disease Mother     Alzheimer's Disease Father     Cancer Sister         colon       CareTeam (Including outside all of your stairways have a railing or banister?: Yes  Are your doorways, halls and stairs free of clutter?: Yes  Do you always fasten your seatbelt when you are in a car?: Yes  Safety Interventions:  · Home safety tips provided    Personalized Preventive Plan   Current Health Maintenance Status  Immunization History   Administered Date(s) Administered    Pneumococcal 13-valent Conjugate ([id number]) 01/04/2016    Pneumococcal Polysaccharide ([id number]) 01/30/2012    Tdap (Boostrix, Adacel) 12/12/2007        Health Maintenance   Topic Date Due    Shingles Vaccine (1 of 2 - 2 Dose Series) 06/23/1993    DTaP/Tdap/Td vaccine (2 - Td) 12/12/2017    TSH testing  03/30/2018    Potassium monitoring  03/30/2018    Creatinine monitoring  03/30/2018    Flu vaccine (1) 09/01/2018    Colon cancer screen colonoscopy  01/05/2019    Lipid screen  07/27/2021    DEXA (modify frequency per FRAX score)  Completed    Pneumococcal low/med risk  Completed     Recommendations for Preventive Services Due: see orders and patient instructions/AVS.  .   Recommended screening schedule for the next 5-10 years is provided to the patient in written form: see Patient Instructions/AVS.

## 2018-08-17 NOTE — PATIENT INSTRUCTIONS
Personalized Preventive Plan for Major Austin - 8/17/2018  Medicare offers a range of preventive health benefits. Some of the tests and screenings are paid in full while other may be subject to a deductible, co-insurance, and/or copay. Some of these benefits include a comprehensive review of your medical history including lifestyle, illnesses that may run in your family, and various assessments and screenings as appropriate. After reviewing your medical record and screening and assessments performed today your provider may have ordered immunizations, labs, imaging, and/or referrals for you. A list of these orders (if applicable) as well as your Preventive Care list are included within your After Visit Summary for your review. Other Preventive Recommendations:    · A preventive eye exam performed by an eye specialist is recommended every 1-2 years to screen for glaucoma; cataracts, macular degeneration, and other eye disorders. · A preventive dental visit is recommended every 6 months. · Try to get at least 150 minutes of exercise per week or 10,000 steps per day on a pedometer . · Order or download the FREE \"Exercise & Physical Activity: Your Everyday Guide\" from [employer]. Call 2-230.452.7258 or search [employer] online. · You need 2903-9304 mg of calcium and 7197-2182 IU of vitamin D per day. It is possible to meet your calcium requirement with diet alone, but a vitamin D supplement is usually necessary to meet this goal.  · When exposed to the sun, use a sunscreen that protects against both UVA and UVB radiation with an SPF of 30 or greater. Reapply every 2 to 3 hours or after sweating, drying off with a towel, or swimming. · Always wear a seat belt when traveling in a car. Always wear a helmet when riding a bicycle or motorcycle. Heart-Healthy Diet   Sodium, Fat, and Cholesterol Controlled Diet       What Is a Heart Healthy Diet?    A heart-healthy diet is one that limits sodium , certain types of fat , and cholesterol . This type of diet is recommended for:   People with any form of cardiovascular disease (eg, coronary heart disease , peripheral vascular disease , previous heart attack , previous stroke )   People with risk factors for cardiovascular disease, such as high blood pressure , high cholesterol , or diabetes   Anyone who wants to lower their risk of developing cardiovascular disease   Sodium    Sodium is a mineral found in many foods. In general, most people consume much more sodium than they need. Diets high in sodium can increase blood pressure and lead to edema (water retention). On a heart-healthy diet, you should consume no more than 2,300 mg (milligrams) of sodium per dayabout the amount in one teaspoon of table salt. The foods highest in sodium include table salt (about 50% sodium), processed foods, convenience foods, and preserved foods. Cholesterol    Cholesterol is a fat-like, waxy substance in your blood. Our bodies make some cholesterol. It is also found in animal products, with the highest amounts in fatty meat, egg yolks, whole milk, cheese, shellfish, and organ meats. On a heart-healthy diet, you should limit your cholesterol intake to less than 200 mg per day. It is normal and important to have some cholesterol in your bloodstream. But too much cholesterol can cause plaque to build up within your arteries, which can eventually lead to a heart attack or stroke. The two types of cholesterol that are most commonly referred to are:   Low-density lipoprotein (LDL) cholesterol  Also known as bad cholesterol, this is the cholesterol that tends to build up along your arteries. Bad cholesterol levels are increased by eating fats that are saturated or hydrogenated. Optimal level of this cholesterol is less than 100. Over 130 starts to get risky for heart disease.    High-density lipoprotein (HDL) cholesterol  Also known as good example, this would mean 60 grams of fat or less per day. Saturated fat and trans fat in your diet raises your blood cholesterol the most, much more than dietary cholesterol does. For this reason, on a heart-healthy diet, less than 7% of your calories should come from saturated fat and ideally 0% from trans fat. On an 1800-calorie diet, this translates into less than 14 grams of saturated fat per day, leaving 46 grams of fat to come from mono- and polyunsaturated fats.    Food Choices on a Heart Healthy Diet   Food Category   Foods Recommended   Foods to Avoid   Grains   Breads and rolls without salted tops Most dry and cooked cereals Unsalted crackers and breadsticks Low-sodium or homemade breadcrumbs or stuffing All rice and pastas   Breads, rolls, and crackers with salted tops High-fat baked goods (eg, muffins, donuts, pastries) Quick breads, self-rising flour, and biscuit mixes Regular bread crumbs Instant hot cereals Commercially prepared rice, pasta, or stuffing mixes   Vegetables   Most fresh, frozen, and low-sodium canned vegetables Low-sodium and salt-free vegetable juices Canned vegetables if unsalted or rinsed   Regular canned vegetables and juices, including sauerkraut and pickled vegetables Frozen vegetables with sauces Commercially prepared potato and vegetable mixes   Fruits   Most fresh, frozen, and canned fruits All fruit juices   Fruits processed with salt or sodium   Milk   Nonfat or low-fat (1%) milk Nonfat or low-fat yogurt Cottage cheese, low-fat ricotta, cheeses labeled as low-fat and low-sodium   Whole milk Reduced-fat (2%) milk Malted and chocolate milk Full fat yogurt Most cheeses (unless low-fat and low salt) Buttermilk (no more than 1 cup per week)   Meats and Beans   Lean cuts of fresh or frozen beef, veal, lamb, or pork (look for the word loin) Fresh or frozen poultry without the skin Fresh or frozen fish and some shellfish Egg whites and egg substitutes (Limit whole eggs to three per and passages between rooms well lit? Can you reach a lamp without getting out of bed? Are floor surfaces well maintained? Shag rugs, high-pile carpeting, tile floors, and polished wood floors can be particularly slippery. Stairs should always have handrails and be carpeted or fitted with a non-skid tread. Is your telephone easily reachable. Is the cord safely tucked away? Room by Room   According to the Association of Aging, bathrooms and sharri are the two most potentially hazardous rooms in your home. In the Kitchen    Be sure your stove is in proper working order and always make sure burners and the oven are off before you go out or go to sleep. Keep pots on the back burners, turn handles away from the front of the stove, and keep stove clean and free of grease build-up. Kitchen ventilation systems and range exhausts should be working properly. Keep flammable objects such as towels and pot holders away from the cooking area except when in use. Make sure kitchen curtains are tied back. Move cords and appliances away from the sink and hot surfaces. If extension cords are needed, install wiring guides so they do not hang over the sink, range, or working areas. Look for coffee pots, kettles and toaster ovens with automatic shut-offs. Keep a mop handy in the kitchen so you can wipe up spills instantly. You should also have a small fire extinguisher. Arrange your kitchen with frequently used items on lower shelves to avoid the need to stand on a stepstool to reach them. Make sure countertops are well-lit to avoid injuries while cutting and preparing food. In the Bathroom    Use a non-slip mat or decals in the tub and shower, since wet, soapy tile or porcelain surfaces are extremely slippery. Make sure bathroom rugs are non-skid or tape them firmly to the floor. Bathtubs should have at least one, preferably two, grab bars, firmly attached to structural supports in the wall.  (Do not use built-in soap holders or glass shower doors as grab bars.)    Tub seats fitted with non-slip material on the legs allow you to wash sitting down. For people with limited mobility, bathtub transfer benches allow you to slide safely into the tub. Raised toilet seats and toilet safety rails are helpful for those with knee or hip problems. In the [facility]    Make sure you use a nightlight and that the area around your bed is clear of potential obstacles. Be careful with electric blankets and never go to sleep with a heating pad, which can cause serious burns even if on a low setting. Use fire-resistant mattress covers and pillows, and NEVER smoke in bed. Keep a phone next to the bed that is programmed to dial 911 at the push of a button. If you have a chronic condition, you may want to sign on with an automatic call-in service. Typically the system includes a small pendant that connects directly to an emergency medical voice-response system. You should also make arrangements to stay in contact with someonefriend, neighbor, family memberon a regular schedule. Fire Prevention   According to the [employer] (Smoke Alarms for Every) [address] [place] [address], senior citizens are one of the two highest risk groups for death and serious injuries due to residential fires. When cooking, wear short-sleeved items, never a bulky long-sleeved robe. The [facility]'s Safety Checklist for Older Consumers emphasizes the importance of checking basements, garages, workshops and storage areas for fire hazards, such as volatile liquids, piles of old rags or clothing and overloaded circuits. Never smoke in bed or when lying down on a couch or recliner chair. Small portable electric or kerosene heaters are responsible for many home fires and should be used cautiously if at all. If you do use one, be sure to keep them away from flammable materials.     In case of fire, make sure you have a pre-established Buddhism practices performed if you become very ill? If you have a choice at the end of your life, where would you prefer to die? At home? In a hospital or nursing home? Somewhere else? Would you prefer to be buried or cremated? Do you want your organs to be donated after you die? What should you do with your living will? Make sure that your family members and your health care agent have copies of your living will. Give your doctor a copy of your living will to keep in your medical record. If you have more than one doctor, make sure that each one has a copy. You may want to put a copy of your living will where it can be easily found. Where can you learn more? Go to https://[employer]pepiceweb."[email or web address]. org and sign in to your [employer] account. Enter X029 in the [employer] box to learn more about \"Learning About Living Lan Shilpa. \"     If you do not have an account, please click on the \"Sign Up Now\" link. Current as of: October 6, 2017  Content Version: 11.7  © 1602-0176 [employer]. Care instructions adapted under license by [facility] ([facility]). If you have questions about a medical condition or this instruction, always ask your healthcare professional. [place] [address] any warranty or liability for your use of this information.   ·

## 2018-08-20 NOTE — PROGRESS NOTES
preventive dental visit is recommended every 6 months. · Try to get at least 150 minutes of exercise per week or 10,000 steps per day on a pedometer . · Order or download the FREE \"Exercise & Physical Activity: Your Everyday Guide\" from [employer]. Call 4-412.485.3613 or search [employer] online. · You need 5398-2225 mg of calcium and 9762-8036 IU of vitamin D per day. It is possible to meet your calcium requirement with diet alone, but a vitamin D supplement is usually necessary to meet this goal.  · When exposed to the sun, use a sunscreen that protects against both UVA and UVB radiation with an SPF of 30 or greater. Reapply every 2 to 3 hours or after sweating, drying off with a towel, or swimming. · Always wear a seat belt when traveling in a car. Always wear a helmet when riding a bicycle or motorcycle. Heart-Healthy Diet   Sodium, Fat, and Cholesterol Controlled Diet       What Is a Heart Healthy Diet? A heart-healthy diet is one that limits sodium , certain types of fat , and cholesterol . This type of diet is recommended for:   People with any form of cardiovascular disease (eg, coronary heart disease , peripheral vascular disease , previous heart attack , previous stroke )   People with risk factors for cardiovascular disease, such as high blood pressure , high cholesterol , or diabetes   Anyone who wants to lower their risk of developing cardiovascular disease   Sodium    Sodium is a mineral found in many foods. In general, most people consume much more sodium than they need. Diets high in sodium can increase blood pressure and lead to edema (water retention). On a heart-healthy diet, you should consume no more than 2,300 mg (milligrams) of sodium per dayabout the amount in one teaspoon of table salt. The foods highest in sodium include table salt (about 50% sodium), processed foods, convenience foods, and preserved foods.    Cholesterol    Cholesterol is a when in use. Make sure kitchen curtains are tied back. Move cords and appliances away from the sink and hot surfaces. If extension cords are needed, install wiring guides so they do not hang over the sink, range, or working areas. Look for coffee pots, kettles and toaster ovens with automatic shut-offs. Keep a mop handy in the kitchen so you can wipe up spills instantly. You should also have a small fire extinguisher. Arrange your kitchen with frequently used items on lower shelves to avoid the need to stand on a stepstool to reach them. Make sure countertops are well-lit to avoid injuries while cutting and preparing food. In the Bathroom    Use a non-slip mat or decals in the tub and shower, since wet, soapy tile or porcelain surfaces are extremely slippery. Make sure bathroom rugs are non-skid or tape them firmly to the floor. Bathtubs should have at least one, preferably two, grab bars, firmly attached to structural supports in the wall. (Do not use built-in soap holders or glass shower doors as grab bars.)    Tub seats fitted with non-slip material on the legs allow you to wash sitting down. For people with limited mobility, bathtub transfer benches allow you to slide safely into the tub. Raised toilet seats and toilet safety rails are helpful for those with knee or hip problems. In the [facility]    Make sure you use a nightlight and that the area around your bed is clear of potential obstacles. Be careful with electric blankets and never go to sleep with a heating pad, which can cause serious burns even if on a low setting. Use fire-resistant mattress covers and pillows, and NEVER smoke in bed. Keep a phone next to the bed that is programmed to dial 911 at the push of a button. If you have a chronic condition, you may want to sign on with an automatic call-in service.  Typically the system includes a small pendant that connects directly to an emergency medical voice-response system. You should also make arrangements to stay in contact with someonefriend, neighbor, family memberon a regular schedule. Fire Prevention   According to the [employer] (Smoke Alarms for Every) [facility], senior citizens are one of the two highest risk groups for death and serious injuries due to residential fires. When cooking, wear short-sleeved items, never a bulky long-sleeved robe. The [facility]'s Safety Checklist for Older Consumers emphasizes the importance of checking basements, garages, workshops and storage areas for fire hazards, such as volatile liquids, piles of old rags or clothing and overloaded circuits. Never smoke in bed or when lying down on a couch or recliner chair. Small portable electric or kerosene heaters are responsible for many home fires and should be used cautiously if at all. If you do use one, be sure to keep them away from flammable materials. In case of fire, make sure you have a pre-established emergency exit plan. Have a professional check your fireplace and other fuel-burning appliances yearly. [employer]   Baby boomers entering the mcmahan years will continue to see the development of new products to help older adults live safely and independently in spite of age-related changes. Making Life More Livable  , by Malika Cruz, lists over 1,000 products for \"living well in the mature years,\" such as bathing and mobility aids, household security devices, ergonomically designed knives and peelers, and faucet valves and knobs for temperature control. Medical supply stores and organizations are good sources of information about products that improve your quality of life and insure your safety. Last Reviewed: November 2009 Marcell Lorenzana MD   Updated: 3/7/2011     ·        Learning About Living [place]  What is a living will? A living will is a legal form you use to write down the kind of care you want at the end of your life.  It is used by the health professionals who will treat you if you aren't able to decide for yourself. If you put your wishes in writing, your loved ones and others will know what kind of care you want. They won't need to guess. This can ease your mind and be helpful to others. A living will is not the same as an estate or property will. An estate will explains what you want to happen with your money and property after you die. Is a living will a legal document? A living will is a legal document. Each state has its own laws about living [place]. If you move to another state, make sure that your living will is legal in the state where you now live. Or you might use a universal form that has been approved by many states. This kind of form can sometimes be completed and stored online. Your electronic copy will then be available wherever you have a connection to the Internet. In most cases, doctors will respect your wishes even if you have a form from a different state. You don't need an  to complete a living will. But legal advice can be helpful if your state's laws are unclear, your health history is complicated, or your family can't agree on what should be in your living will. You can change your living will at any time. Some people find that their wishes about end-of-life care change as their health changes. In addition to making a living will, think about completing a medical power of  form. This form lets you name the person you want to make end-of-life treatment decisions for you (your \"health care agent\") if you're not able to. Many hospitals and nursing homes will give you the forms you need to complete a living will and a medical power of . Your living will is used only if you can't make or communicate decisions for yourself anymore. If you become able to make decisions again, you can accept or refuse any treatment, no matter what you wrote in your living will.   Your state may offer an

## 2018-08-29 DIAGNOSIS — Z12.31 ENCOUNTER FOR SCREENING MAMMOGRAM FOR BREAST CANCER: ICD-10-CM

## 2018-11-06 ENCOUNTER — OFFICE VISIT (OUTPATIENT)
Dept: NEUROLOGY | Age: 73
End: 2018-11-06
Payer: MEDICARE

## 2018-11-06 VITALS
WEIGHT: 238 LBS | HEIGHT: 69 IN | SYSTOLIC BLOOD PRESSURE: 128 MMHG | DIASTOLIC BLOOD PRESSURE: 76 MMHG | BODY MASS INDEX: 35.25 KG/M2

## 2018-11-06 DIAGNOSIS — G62.9 NEUROPATHY: Primary | ICD-10-CM

## 2018-11-06 DIAGNOSIS — M54.5 LOW BACK PAIN, UNSPECIFIED BACK PAIN LATERALITY, UNSPECIFIED CHRONICITY, WITH SCIATICA PRESENCE UNSPECIFIED: ICD-10-CM

## 2018-11-06 DIAGNOSIS — M54.2 PAIN, NECK: ICD-10-CM

## 2018-11-06 DIAGNOSIS — E53.9 BURNING FEET SYNDROME: ICD-10-CM

## 2018-11-06 PROCEDURE — G8399 PT W/DXA RESULTS DOCUMENT: HCPCS | Performed by: PSYCHIATRY & NEUROLOGY

## 2018-11-06 PROCEDURE — 4040F PNEUMOC VAC/ADMIN/RCVD: CPT | Performed by: PSYCHIATRY & NEUROLOGY

## 2018-11-06 PROCEDURE — 1123F ACP DISCUSS/DSCN MKR DOCD: CPT | Performed by: PSYCHIATRY & NEUROLOGY

## 2018-11-06 PROCEDURE — 1036F TOBACCO NON-USER: CPT | Performed by: PSYCHIATRY & NEUROLOGY

## 2018-11-06 PROCEDURE — 1101F PT FALLS ASSESS-DOCD LE1/YR: CPT | Performed by: PSYCHIATRY & NEUROLOGY

## 2018-11-06 PROCEDURE — G8417 CALC BMI ABV UP PARAM F/U: HCPCS | Performed by: PSYCHIATRY & NEUROLOGY

## 2018-11-06 PROCEDURE — 1090F PRES/ABSN URINE INCON ASSESS: CPT | Performed by: PSYCHIATRY & NEUROLOGY

## 2018-11-06 PROCEDURE — 99213 OFFICE O/P EST LOW 20 MIN: CPT | Performed by: PSYCHIATRY & NEUROLOGY

## 2018-11-06 PROCEDURE — G8427 DOCREV CUR MEDS BY ELIG CLIN: HCPCS | Performed by: PSYCHIATRY & NEUROLOGY

## 2018-11-06 PROCEDURE — 3017F COLORECTAL CA SCREEN DOC REV: CPT | Performed by: PSYCHIATRY & NEUROLOGY

## 2018-11-06 PROCEDURE — G8484 FLU IMMUNIZE NO ADMIN: HCPCS | Performed by: PSYCHIATRY & NEUROLOGY

## 2018-11-06 RX ORDER — TRAMADOL HYDROCHLORIDE 50 MG/1
50 TABLET ORAL NIGHTLY
COMMUNITY
End: 2019-09-05

## 2019-02-06 ENCOUNTER — OFFICE VISIT (OUTPATIENT)
Dept: NEUROLOGY | Age: 74
End: 2019-02-06
Payer: MEDICARE

## 2019-02-06 VITALS
SYSTOLIC BLOOD PRESSURE: 125 MMHG | HEART RATE: 73 BPM | WEIGHT: 235 LBS | DIASTOLIC BLOOD PRESSURE: 81 MMHG | BODY MASS INDEX: 33.64 KG/M2 | HEIGHT: 70 IN

## 2019-02-06 DIAGNOSIS — G62.9 NEUROPATHY: Primary | ICD-10-CM

## 2019-02-06 DIAGNOSIS — G44.89 OTHER HEADACHE SYNDROME: ICD-10-CM

## 2019-02-06 DIAGNOSIS — M54.2 PAIN, NECK: ICD-10-CM

## 2019-02-06 DIAGNOSIS — M54.5 LOW BACK PAIN, UNSPECIFIED BACK PAIN LATERALITY, UNSPECIFIED CHRONICITY, WITH SCIATICA PRESENCE UNSPECIFIED: ICD-10-CM

## 2019-02-06 DIAGNOSIS — E53.9 BURNING FEET SYNDROME: ICD-10-CM

## 2019-02-06 PROCEDURE — 4040F PNEUMOC VAC/ADMIN/RCVD: CPT | Performed by: PSYCHIATRY & NEUROLOGY

## 2019-02-06 PROCEDURE — 1101F PT FALLS ASSESS-DOCD LE1/YR: CPT | Performed by: PSYCHIATRY & NEUROLOGY

## 2019-02-06 PROCEDURE — 1090F PRES/ABSN URINE INCON ASSESS: CPT | Performed by: PSYCHIATRY & NEUROLOGY

## 2019-02-06 PROCEDURE — 99213 OFFICE O/P EST LOW 20 MIN: CPT | Performed by: PSYCHIATRY & NEUROLOGY

## 2019-02-06 PROCEDURE — 1036F TOBACCO NON-USER: CPT | Performed by: PSYCHIATRY & NEUROLOGY

## 2019-02-06 PROCEDURE — 3017F COLORECTAL CA SCREEN DOC REV: CPT | Performed by: PSYCHIATRY & NEUROLOGY

## 2019-02-06 PROCEDURE — G8417 CALC BMI ABV UP PARAM F/U: HCPCS | Performed by: PSYCHIATRY & NEUROLOGY

## 2019-02-06 PROCEDURE — G8427 DOCREV CUR MEDS BY ELIG CLIN: HCPCS | Performed by: PSYCHIATRY & NEUROLOGY

## 2019-02-06 PROCEDURE — G8399 PT W/DXA RESULTS DOCUMENT: HCPCS | Performed by: PSYCHIATRY & NEUROLOGY

## 2019-02-06 PROCEDURE — G8484 FLU IMMUNIZE NO ADMIN: HCPCS | Performed by: PSYCHIATRY & NEUROLOGY

## 2019-02-06 PROCEDURE — 1123F ACP DISCUSS/DSCN MKR DOCD: CPT | Performed by: PSYCHIATRY & NEUROLOGY

## 2019-02-11 RX ORDER — LISINOPRIL 10 MG/1
TABLET ORAL
Qty: 90 TABLET | Refills: 2 | Status: SHIPPED | OUTPATIENT
Start: 2019-02-11 | End: 2019-11-12 | Stop reason: SDUPTHER

## 2019-05-06 ENCOUNTER — OFFICE VISIT (OUTPATIENT)
Dept: NEUROLOGY | Age: 74
End: 2019-05-06
Payer: MEDICARE

## 2019-05-06 VITALS
DIASTOLIC BLOOD PRESSURE: 84 MMHG | HEART RATE: 79 BPM | SYSTOLIC BLOOD PRESSURE: 130 MMHG | WEIGHT: 236 LBS | BODY MASS INDEX: 34.96 KG/M2 | HEIGHT: 69 IN

## 2019-05-06 DIAGNOSIS — M54.2 PAIN, NECK: ICD-10-CM

## 2019-05-06 DIAGNOSIS — G44.89 OTHER HEADACHE SYNDROME: ICD-10-CM

## 2019-05-06 DIAGNOSIS — E53.9 BURNING FEET SYNDROME: ICD-10-CM

## 2019-05-06 DIAGNOSIS — G62.9 NEUROPATHY: Primary | ICD-10-CM

## 2019-05-06 DIAGNOSIS — M54.5 LOW BACK PAIN, UNSPECIFIED BACK PAIN LATERALITY, UNSPECIFIED CHRONICITY, WITH SCIATICA PRESENCE UNSPECIFIED: ICD-10-CM

## 2019-05-06 PROCEDURE — 1123F ACP DISCUSS/DSCN MKR DOCD: CPT | Performed by: PSYCHIATRY & NEUROLOGY

## 2019-05-06 PROCEDURE — 4040F PNEUMOC VAC/ADMIN/RCVD: CPT | Performed by: PSYCHIATRY & NEUROLOGY

## 2019-05-06 PROCEDURE — 99213 OFFICE O/P EST LOW 20 MIN: CPT | Performed by: PSYCHIATRY & NEUROLOGY

## 2019-05-06 PROCEDURE — 3017F COLORECTAL CA SCREEN DOC REV: CPT | Performed by: PSYCHIATRY & NEUROLOGY

## 2019-05-06 PROCEDURE — 1036F TOBACCO NON-USER: CPT | Performed by: PSYCHIATRY & NEUROLOGY

## 2019-05-06 PROCEDURE — G8417 CALC BMI ABV UP PARAM F/U: HCPCS | Performed by: PSYCHIATRY & NEUROLOGY

## 2019-05-06 PROCEDURE — G8399 PT W/DXA RESULTS DOCUMENT: HCPCS | Performed by: PSYCHIATRY & NEUROLOGY

## 2019-05-06 PROCEDURE — G8427 DOCREV CUR MEDS BY ELIG CLIN: HCPCS | Performed by: PSYCHIATRY & NEUROLOGY

## 2019-05-06 PROCEDURE — 1090F PRES/ABSN URINE INCON ASSESS: CPT | Performed by: PSYCHIATRY & NEUROLOGY

## 2019-05-06 NOTE — PROGRESS NOTES
Chief Complaint   Patient presents with    Follow-up     3 month neuropathy follow up        Madeline Fortune is a 74y.o. year old female who is seen for evaluation of Burning feet. This has been present for about 5 years or more. It is typically worse at night when she lays down. She sometimes feels that the nerve endings to her body are not normal. She does have some chronic neck pain issues and some occasional low back pain issues. She denies any weakness. She indicates her mother and diabetes and neuropathy. She denies any exposure to chemicals, toxins, or pesticides. She has tried Cymbalta or amitriptyline in the past. Tramadol does help. Working more in yard and more back pain. Stretching helps. Did not tolerate trileptal. Going to chiropractor helps neck and back. Gabapentin made her sick. Tramadol still helps only one at night. .Cut back on sugar and going to chiropractor. Overall stable. Only takes tramadol at night.     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/2018    Pain, neck 03/21/2018    Other headache syndrome 02/06/2019     Resolved Ambulatory Problems     Diagnosis Date Noted    No Resolved Ambulatory Problems     Past Medical History:   Diagnosis Date    Back pain     GERD (gastroesophageal reflux disease)     Hypertension     Hypothyroidism     Xerosis of skin        Past Surgical History:   Procedure Laterality Date    CHOLECYSTECTOMY      COLONOSCOPY      1-09    TUBAL LIGATION         Family History   Problem Relation Age of Onset    Diabetes Mother     High Blood Pressure Mother     Alzheimer's Disease Mother     Alzheimer's Disease Father     Cancer Sister         colon       Allergies Allergen Reactions    Neurontin [Gabapentin] Hives and Itching       Social History     Socioeconomic History    Marital status:      Spouse name: Not on file    Number of children: 1    Years of education: Not on file    Highest education level: Not on file   Occupational History    Occupation: retired    Social Needs    Financial resource strain: Not on file    Food insecurity:     Worry: Not on file     Inability: Not on file   [employer] needs:     Medical: Not on file     Non-medical: Not on file   Tobacco Use    Smoking status: Never Smoker    Smokeless tobacco: Never Used   Substance and Sexual Activity    Alcohol use: No    Drug use: No    Sexual activity: Yes     Partners: Male   Lifestyle    Physical activity:     Days per week: Not on file     Minutes per session: Not on file    Stress: Not on file   Relationships    Social connections:     Talks on phone: Not on file     Gets together: Not on file     Attends [employer] service: Not on file     Active member of club or organization: Not on file     Attends meetings of clubs or organizations: Not on file     Relationship status: Not on file    Intimate partner violence:     Fear of current or ex partner: Not on file     Emotionally abused: Not on file     Physically abused: Not on file     Forced sexual activity: Not on file   Other Topics Concern    Not on file   Social History Narrative    Not on file     Review of Systems     Constitutional - No fever or chills. No diaphoresis or significant fatigue. HENT -  No tinnitus or significant hearing loss. Eyes - no sudden vision change or eye pain  Respiratory - no significant shortness of breath or cough  Cardiovascular - no chest pain No palpitations or significant leg swelling  Gastrointestinal - no abdominal swelling or pain. Genitourinary - No difficulty urinating, dysuria  Musculoskeletal - yes back pain or myalgia.   Skin - no color change or rash  Neurologic - No seizures. No lateralizing weakness. Hematologic - yes easy bruising or excessive bleeding. Psychiatric - no severe anxiety or nervousness. All other review of systems are negative. Current Outpatient Medications   Medication Sig Dispense Refill    lisinopril (PRINIVIL;ZESTRIL) 10 MG tablet TAKE 1 TABLET DAILY FOR HIGH BLOOD PRESSURE 90 tablet 2    levothyroxine (SYNTHROID) 50 MCG tablet TAKE 1 TABLET DAILY FOR LOW THYROID 90 tablet 3    traMADol (ULTRAM) 50 MG tablet Take 50 mg by mouth nightly. [facility] esomeprazole (NEXIUM) 40 MG delayed release capsule Take 1 capsule by mouth daily Stop protonix 30 capsule 3    Vitamin D (CHOLECALCIFEROL) 1000 UNITS CAPS capsule Take 1,000 Units by mouth daily       No current facility-administered medications for this visit. /84   Pulse 79   Ht 5' 9\" (1.753 m)   Wt 236 lb (107 kg)   BMI 34.85 kg/m²     Constitutional - well developed, well nourished. Eyes - conjunctiva normal.    Ear, nose, throat - No scars, masses, or lesions over external nose or ears, no atrophy of tongue  Neck-symmetric, no masses noted, no jugular vein distension  Respiration- chest wall appears symmetric, good expansion,   normal effort without use of accessory muscles  Musculoskeletal - no significant wasting of muscles noted, no bony deformities  Extremities-no clubbing, cyanosis or edema  Skin - warm, dry, and intact. No rash, erythema, or pallor.   Psychiatric - mood, affect, and behavior appear normal.      Neurological exam  Awake, alert, fluent oriented  appropriate affect  Attention and concentration appear appropriate  Recent and remote memory appears unremarkable  Speech normal without dysarthria  No clear issues with language of fund of knowledge    Cranial Nerve Exam     CN III, IV,VI-EOMI, No nystagmus, conjugate eye movements, no ptosis    CN VII-no facial assymetry    Motor Exam  antigravity throughout upper and lower extremities bilaterally, no cogwheeling, normal tone          Gait  Normal base and speed  No ataxia    Coordination  Finger to nose-unremarkable    Lab Results   Component Value Date    [id number] 534 03/21/2018     Lab Results   Component Value Date    WBC 5.9 03/30/2017    HGB 14.6 03/30/2017    HCT 43.6 03/30/2017    MCV 93.8 03/30/2017     03/30/2017     Lab Results   Component Value Date     08/17/2018    K 4.5 08/17/2018     08/17/2018    CO2 25 08/17/2018    BUN 20 08/17/2018    CREATININE 1.0 (H) 08/17/2018    GLUCOSE 86 08/17/2018    CALCIUM 9.7 08/17/2018    PROT 7.9 08/17/2018    LABALBU 4.4 08/17/2018    BILITOT 0.6 08/17/2018    ALKPHOS 69 08/17/2018    AST 22 08/17/2018    ALT 19 08/17/2018    LABGLOM 54 (A) 08/17/2018    GLOB 3.1 03/30/2017       Impression: The findings of this study are 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           ___________________________  Zachery Godinez M.D. Board certified in Neurology  Board certified in Clinical Neurophysiology  Fellowship trained in [facility]    1. Neuropathy G62.9    2. Burning feet syndrome E53.9    3. Low back pain, unspecified back pain laterality, unspecified chronicity, with sciatica presence unspecified M54.5    4. Pain, neck M54.2    5. Other headache syndrome G44.89        Her neurological examination today was unremarkable except for some reduced reflexes in the extremities. Based upon her history and examination I am suspicious for a peripheral neuropathy. Her EMG with nerve conduction study of the legs was suggestive of a sensory neuropathy. Her lab work was unremarkable. She will be continued on tramadol as this provides benefit. The patient indicates understanding of the management plan. She is to follow-up with me in approximately 3 month and call with any further problems. continue current care  Plan    No orders of the defined types were placed in this encounter.       No orders of the defined types were placed in this encounter. Return in about 6 months (around 11/6/2019). EMR Dragon/transcription disclaimer:Significant part of this  encounter note is electronic transcription/translation of spoken language to printed text. The electronic translation of spoken language may be erroneous, or at times, nonsensical words or phrases may be inadvertently transcribed.  Although I have reviewed the note for such errors, some may still exist.

## 2019-07-24 ENCOUNTER — TELEPHONE (OUTPATIENT)
Dept: NEUROSURGERY | Age: 74
End: 2019-07-24

## 2019-08-13 RX ORDER — PANTOPRAZOLE SODIUM 40 MG/1
TABLET, DELAYED RELEASE ORAL
Qty: 90 TABLET | Refills: 3 | Status: SHIPPED | OUTPATIENT
Start: 2019-08-13 | End: 2019-08-26

## 2019-08-26 ENCOUNTER — OFFICE VISIT (OUTPATIENT)
Dept: NEUROLOGY | Age: 74
End: 2019-08-26
Payer: MEDICARE

## 2019-08-26 VITALS
SYSTOLIC BLOOD PRESSURE: 136 MMHG | HEIGHT: 70 IN | WEIGHT: 240 LBS | BODY MASS INDEX: 34.36 KG/M2 | DIASTOLIC BLOOD PRESSURE: 81 MMHG | HEART RATE: 59 BPM

## 2019-08-26 DIAGNOSIS — M54.2 PAIN, NECK: ICD-10-CM

## 2019-08-26 DIAGNOSIS — G62.9 NEUROPATHY: Primary | ICD-10-CM

## 2019-08-26 DIAGNOSIS — E53.9 BURNING FEET SYNDROME: ICD-10-CM

## 2019-08-26 DIAGNOSIS — M54.5 LOW BACK PAIN, UNSPECIFIED BACK PAIN LATERALITY, UNSPECIFIED CHRONICITY, WITH SCIATICA PRESENCE UNSPECIFIED: ICD-10-CM

## 2019-08-26 DIAGNOSIS — G44.89 OTHER HEADACHE SYNDROME: ICD-10-CM

## 2019-08-26 PROCEDURE — 4040F PNEUMOC VAC/ADMIN/RCVD: CPT | Performed by: PSYCHIATRY & NEUROLOGY

## 2019-08-26 PROCEDURE — 99214 OFFICE O/P EST MOD 30 MIN: CPT | Performed by: PSYCHIATRY & NEUROLOGY

## 2019-08-26 PROCEDURE — 1090F PRES/ABSN URINE INCON ASSESS: CPT | Performed by: PSYCHIATRY & NEUROLOGY

## 2019-08-26 PROCEDURE — G8427 DOCREV CUR MEDS BY ELIG CLIN: HCPCS | Performed by: PSYCHIATRY & NEUROLOGY

## 2019-08-26 PROCEDURE — 1036F TOBACCO NON-USER: CPT | Performed by: PSYCHIATRY & NEUROLOGY

## 2019-08-26 PROCEDURE — G8417 CALC BMI ABV UP PARAM F/U: HCPCS | Performed by: PSYCHIATRY & NEUROLOGY

## 2019-08-26 PROCEDURE — G8399 PT W/DXA RESULTS DOCUMENT: HCPCS | Performed by: PSYCHIATRY & NEUROLOGY

## 2019-08-26 PROCEDURE — 1123F ACP DISCUSS/DSCN MKR DOCD: CPT | Performed by: PSYCHIATRY & NEUROLOGY

## 2019-08-26 RX ORDER — TRAMADOL HYDROCHLORIDE 50 MG/1
50 TABLET ORAL EVERY EVENING
Qty: 30 TABLET | Refills: 5 | Status: SHIPPED | OUTPATIENT
Start: 2019-08-26 | End: 2019-09-05

## 2019-08-26 NOTE — PROGRESS NOTES
conjugate eye movements, no ptosis    CN VII-no facial assymetry    Motor Exam  antigravity throughout upper and lower extremities bilaterally, no cogwheeling, normal tone          Gait  Normal base and speed  No ataxia    Coordination  Finger to nose-unremarkable    Lab Results   Component Value Date    [id number] 534 03/21/2018     Lab Results   Component Value Date    WBC 5.9 03/30/2017    HGB 14.6 03/30/2017    HCT 43.6 03/30/2017    MCV 93.8 03/30/2017     03/30/2017     Lab Results   Component Value Date     08/17/2018    K 4.5 08/17/2018     08/17/2018    CO2 25 08/17/2018    BUN 20 08/17/2018    CREATININE 1.0 (H) 08/17/2018    GLUCOSE 86 08/17/2018    CALCIUM 9.7 08/17/2018    PROT 7.9 08/17/2018    LABALBU 4.4 08/17/2018    BILITOT 0.6 08/17/2018    ALKPHOS 69 08/17/2018    AST 22 08/17/2018    ALT 19 08/17/2018    LABGLOM 54 (A) 08/17/2018    GLOB 3.1 03/30/2017       Impression: The findings of this study are 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           ___________________________  Nir Harmon M.D. Board certified in Neurology  Board certified in Clinical Neurophysiology  Fellowship trained in [address]    1. Neuropathy G62.9    2. Burning feet syndrome E53.9    3. Low back pain, unspecified back pain laterality, unspecified chronicity, with sciatica presence unspecified M54.5    4. Pain, neck M54.2    5. Other headache syndrome G44.89 traMADol (ULTRAM) 50 MG tablet       Her neurological examination today was unremarkable except for some reduced reflexes in the extremities. Based upon her history and examination I am suspicious for a peripheral neuropathy. Her EMG with nerve conduction study of the legs was suggestive of a sensory neuropathy. Her lab work was unremarkable. She will be continued on tramadol as this provides benefit. may give a few pills of Klonopin to see if this helps her itching.  He is to check with her pharmacy to see if they changes her generic of tramadol. The patient indicates understanding of the management plan. She is to follow-up with me in approximately 3 month and call with any further problems. continue current care      Plan    No orders of the defined types were placed in this encounter. Orders Placed This Encounter   Medications    traMADol (ULTRAM) 50 MG tablet     Sig: Take 1 tablet by mouth every evening for 30 days. Dispense:  30 tablet     Refill:  5     Reduce doses taken as pain becomes manageable       Return in about 3 months (around 11/26/2019). EMR Dragon/transcription disclaimer:Significant part of this  encounter note is electronic transcription/translation of spoken language to printed text. The electronic translation of spoken language may be erroneous, or at times, nonsensical words or phrases may be inadvertently transcribed.  Although I have reviewed the note for such errors, some may still exist.

## 2019-09-05 ENCOUNTER — OFFICE VISIT (OUTPATIENT)
Dept: PRIMARY CARE CLINIC | Age: 74
End: 2019-09-05
Payer: MEDICARE

## 2019-09-05 VITALS
HEART RATE: 66 BPM | WEIGHT: 241.8 LBS | HEIGHT: 69 IN | RESPIRATION RATE: 18 BRPM | TEMPERATURE: 97.8 F | DIASTOLIC BLOOD PRESSURE: 75 MMHG | SYSTOLIC BLOOD PRESSURE: 125 MMHG | BODY MASS INDEX: 35.81 KG/M2 | OXYGEN SATURATION: 97 %

## 2019-09-05 DIAGNOSIS — E03.9 ACQUIRED HYPOTHYROIDISM: ICD-10-CM

## 2019-09-05 DIAGNOSIS — E55.9 VITAMIN D DEFICIENCY: ICD-10-CM

## 2019-09-05 DIAGNOSIS — Z78.0 POST-MENOPAUSAL: ICD-10-CM

## 2019-09-05 DIAGNOSIS — Z00.00 ROUTINE GENERAL MEDICAL EXAMINATION AT A HEALTH CARE FACILITY: Primary | ICD-10-CM

## 2019-09-05 DIAGNOSIS — I10 ESSENTIAL HYPERTENSION: ICD-10-CM

## 2019-09-05 DIAGNOSIS — Z12.31 ENCOUNTER FOR SCREENING MAMMOGRAM FOR BREAST CANCER: ICD-10-CM

## 2019-09-05 DIAGNOSIS — E78.00 HYPERCHOLESTEROLEMIA: ICD-10-CM

## 2019-09-05 DIAGNOSIS — G62.9 NEUROPATHY: ICD-10-CM

## 2019-09-05 DIAGNOSIS — L29.9 GENERALIZED PRURITUS: ICD-10-CM

## 2019-09-05 DIAGNOSIS — Z23 NEED FOR SHINGLES VACCINE: ICD-10-CM

## 2019-09-05 DIAGNOSIS — M54.2 PAIN, NECK: ICD-10-CM

## 2019-09-05 LAB
ALBUMIN SERPL-MCNC: 4.4 G/DL (ref 3.5–5.2)
ALP BLD-CCNC: 78 U/L (ref 35–104)
ALT SERPL-CCNC: 14 U/L (ref 5–33)
ANION GAP SERPL CALCULATED.3IONS-SCNC: 14 MMOL/L (ref 7–19)
AST SERPL-CCNC: 17 U/L (ref 5–32)
BILIRUB SERPL-MCNC: 0.6 MG/DL (ref 0.2–1.2)
BUN BLDV-MCNC: 17 MG/DL (ref 8–23)
CALCIUM SERPL-MCNC: 9.7 MG/DL (ref 8.8–10.2)
CHLORIDE BLD-SCNC: 103 MMOL/L (ref 98–111)
CHOLESTEROL, TOTAL: 196 MG/DL (ref 160–199)
CO2: 25 MMOL/L (ref 22–29)
CREAT SERPL-MCNC: 1 MG/DL (ref 0.5–0.9)
GFR NON-AFRICAN AMERICAN: 54
GLUCOSE BLD-MCNC: 97 MG/DL (ref 74–109)
HCT VFR BLD CALC: 42.4 % (ref 37–47)
HDLC SERPL-MCNC: 79 MG/DL (ref 65–121)
HEMOGLOBIN: 13.6 G/DL (ref 12–16)
LDL CHOLESTEROL CALCULATED: 102 MG/DL
MCH RBC QN AUTO: 31.2 PG (ref 27–31)
MCHC RBC AUTO-ENTMCNC: 32.1 G/DL (ref 33–37)
MCV RBC AUTO: 97.2 FL (ref 81–99)
PDW BLD-RTO: 13.2 % (ref 11.5–14.5)
PLATELET # BLD: 212 K/UL (ref 130–400)
PMV BLD AUTO: 11.1 FL (ref 9.4–12.3)
POTASSIUM SERPL-SCNC: 4.2 MMOL/L (ref 3.5–5)
RBC # BLD: 4.36 M/UL (ref 4.2–5.4)
SEDIMENTATION RATE, ERYTHROCYTE: 22 MM/HR (ref 0–25)
SODIUM BLD-SCNC: 142 MMOL/L (ref 136–145)
T4 TOTAL: 11.5 UG/DL (ref 4.5–11.7)
TOTAL PROTEIN: 8.1 G/DL (ref 6.6–8.7)
TRIGL SERPL-MCNC: 76 MG/DL (ref 0–149)
TSH SERPL DL<=0.05 MIU/L-ACNC: 2.27 UIU/ML (ref 0.27–4.2)
VITAMIN D 25-HYDROXY: 37.2 NG/ML
WBC # BLD: 5.8 K/UL (ref 4.8–10.8)

## 2019-09-05 PROCEDURE — 1123F ACP DISCUSS/DSCN MKR DOCD: CPT | Performed by: FAMILY MEDICINE

## 2019-09-05 PROCEDURE — 1036F TOBACCO NON-USER: CPT | Performed by: FAMILY MEDICINE

## 2019-09-05 PROCEDURE — G8417 CALC BMI ABV UP PARAM F/U: HCPCS | Performed by: FAMILY MEDICINE

## 2019-09-05 PROCEDURE — G8399 PT W/DXA RESULTS DOCUMENT: HCPCS | Performed by: FAMILY MEDICINE

## 2019-09-05 PROCEDURE — 4040F PNEUMOC VAC/ADMIN/RCVD: CPT | Performed by: FAMILY MEDICINE

## 2019-09-05 PROCEDURE — G0439 PPPS, SUBSEQ VISIT: HCPCS | Performed by: FAMILY MEDICINE

## 2019-09-05 PROCEDURE — 1090F PRES/ABSN URINE INCON ASSESS: CPT | Performed by: FAMILY MEDICINE

## 2019-09-05 PROCEDURE — G8427 DOCREV CUR MEDS BY ELIG CLIN: HCPCS | Performed by: FAMILY MEDICINE

## 2019-09-05 PROCEDURE — 99214 OFFICE O/P EST MOD 30 MIN: CPT | Performed by: FAMILY MEDICINE

## 2019-09-05 PROCEDURE — 36415 COLL VENOUS BLD VENIPUNCTURE: CPT | Performed by: FAMILY MEDICINE

## 2019-09-05 ASSESSMENT — PATIENT HEALTH QUESTIONNAIRE - PHQ9
SUM OF ALL RESPONSES TO PHQ QUESTIONS 1-9: 0
SUM OF ALL RESPONSES TO PHQ QUESTIONS 1-9: 0

## 2019-09-05 ASSESSMENT — LIFESTYLE VARIABLES: HOW OFTEN DO YOU HAVE A DRINK CONTAINING ALCOHOL: 0

## 2019-09-05 NOTE — PROGRESS NOTES
Care Team:  Yamila Pruitt MD as PCP - General (Family Medicine)  Yamila Pruitt [facility] PCP [facility]  Estephania Nicholson MD as Neurologist (Neurology)    Wt Readings from Last 3 Encounters:   09/05/19 241 lb 12.8 oz (109.7 kg)   08/26/19 240 lb (108.9 kg)   05/06/19 236 lb (107 kg)     Vitals:    09/05/19 1006   BP: 125/75   Site: Left Upper Arm   Position: Sitting   Cuff Size: Large Adult   Pulse: 66   Resp: 18   Temp: 97.8 °F (36.6 °C)   TempSrc: Temporal   SpO2: 97%   Weight: 241 lb 12.8 oz (109.7 kg)   Height: 5' 9\" (1.753 m)     Body mass index is 35.71 kg/m². Based upon direct observation of the patient, evaluation of cognition reveals recent and remote memory intact. Patient's complete Health Risk Assessment and screening values have been reviewed and are found in Flowsheets. The following problems were reviewed today and where indicated follow up appointments were made and/or referrals ordered. Positive Risk Factor Screenings with Interventions:     General Health:  General  In general, how would you say your health is?: Very Good  In the past 7 days, have you experienced any of the following? New or Increased Pain, New or Increased Fatigue, Loneliness, Social Isolation, Stress or Anger?: None of These  Do you get the social and emotional support that you need?: Yes  Do you have a Living Will?: (!) No  General Health Risk Interventions:  · No Living Will: additional information provided . Health Habits/Nutrition:  Health Habits/Nutrition  Do you exercise for at least 20 minutes 2-3 times per week?: (!) No  Have you lost any weight without trying in the past 3 months?: No  Do you eat fewer than 2 meals per day?: No  Have you seen a dentist within the past year?: (!) No  Body mass index is 35.71 kg/m². Health Habits/Nutrition Interventions:  · Inadequate physical activity:  Patient encouraged to increase exercise.   · Dental exam overdue:  patient encouraged to make appointment with

## 2019-09-05 NOTE — PROGRESS NOTES
developing cardiovascular disease   Sodium    Sodium is a mineral found in many foods. In general, most people consume much more sodium than they need. Diets high in sodium can increase blood pressure and lead to edema (water retention). On a heart-healthy diet, you should consume no more than 2,300 mg (milligrams) of sodium per dayabout the amount in one teaspoon of table salt. The foods highest in sodium include table salt (about 50% sodium), processed foods, convenience foods, and preserved foods. Cholesterol    Cholesterol is a fat-like, waxy substance in your blood. Our bodies make some cholesterol. It is also found in animal products, with the highest amounts in fatty meat, egg yolks, whole milk, cheese, shellfish, and organ meats. On a heart-healthy diet, you should limit your cholesterol intake to less than 200 mg per day. It is normal and important to have some cholesterol in your bloodstream. But too much cholesterol can cause plaque to build up within your arteries, which can eventually lead to a heart attack or stroke. The two types of cholesterol that are most commonly referred to are:   Low-density lipoprotein (LDL) cholesterol  Also known as bad cholesterol, this is the cholesterol that tends to build up along your arteries. Bad cholesterol levels are increased by eating fats that are saturated or hydrogenated. Optimal level of this cholesterol is less than 100. Over 130 starts to get risky for heart disease. High-density lipoprotein (HDL) cholesterol  Also known as good cholesterol, this type of cholesterol actually carries cholesterol away from your arteries and may, therefore, help lower your risk of having a heart attack. You want this level to be high (ideally greater than 60). It is a risk to have a level less than 40. You can raise this good cholesterol by eating olive oil, canola oil, avocados, or nuts. Exercise raises this level, too.    Fat    Fat is calorie dense and packs a lot of come from mono- and polyunsaturated fats.    Food Choices on a Heart Healthy Diet   Food Category   Foods Recommended   Foods to Avoid   Grains   Breads and rolls without salted tops Most dry and cooked cereals Unsalted crackers and breadsticks Low-sodium or homemade breadcrumbs or stuffing All rice and pastas   Breads, rolls, and crackers with salted tops High-fat baked goods (eg, muffins, donuts, pastries) Quick breads, self-rising flour, and biscuit mixes Regular bread crumbs Instant hot cereals Commercially prepared rice, pasta, or stuffing mixes   Vegetables   Most fresh, frozen, and low-sodium canned vegetables Low-sodium and salt-free vegetable juices Canned vegetables if unsalted or rinsed   Regular canned vegetables and juices, including sauerkraut and pickled vegetables Frozen vegetables with sauces Commercially prepared potato and vegetable mixes   Fruits   Most fresh, frozen, and canned fruits All fruit juices   Fruits processed with salt or sodium   Milk   Nonfat or low-fat (1%) milk Nonfat or low-fat yogurt Cottage cheese, low-fat ricotta, cheeses labeled as low-fat and low-sodium   Whole milk Reduced-fat (2%) milk Malted and chocolate milk Full fat yogurt Most cheeses (unless low-fat and low salt) Buttermilk (no more than 1 cup per week)   Meats and Beans   Lean cuts of fresh or frozen beef, veal, lamb, or pork (look for the word loin) Fresh or frozen poultry without the skin Fresh or frozen fish and some shellfish Egg whites and egg substitutes (Limit whole eggs to three per week) Tofu Nuts or seeds (unsalted, dry-roasted), low-sodium peanut butter Dried peas, beans, and lentils   Any smoked, cured, salted, or canned meat, fish, or poultry (including rain, chipped beef, cold cuts, hot dogs, sausages, sardines, and anchovies) Poultry skins Breaded and/or fried fish or meats Canned peas, beans, and lentils Salted nuts   Fats and Oils   Olive oil and canola oil Low-sodium, low-fat salad dressings and

## 2019-09-05 NOTE — PATIENT INSTRUCTIONS
Personalized Preventive Plan for Hai Alfaro - 9/5/2019  Medicare offers a range of preventive health benefits. Some of the tests and screenings are paid in full while other may be subject to a deductible, co-insurance, and/or copay. Some of these benefits include a comprehensive review of your medical history including lifestyle, illnesses that may run in your family, and various assessments and screenings as appropriate. After reviewing your medical record and screening and assessments performed today your provider may have ordered immunizations, labs, imaging, and/or referrals for you. A list of these orders (if applicable) as well as your Preventive Care list are included within your After Visit Summary for your review. Other Preventive Recommendations:    · A preventive eye exam performed by an eye specialist is recommended every 1-2 years to screen for glaucoma; cataracts, macular degeneration, and other eye disorders. · A preventive dental visit is recommended every 6 months. · Try to get at least 150 minutes of exercise per week or 10,000 steps per day on a pedometer . · Order or download the FREE \"Exercise & Physical Activity: Your Everyday Guide\" from [employer]. Call 3-555.606.1554 or search [employer] online. · You need 2457-3627 mg of calcium and 2584-6089 IU of vitamin D per day. It is possible to meet your calcium requirement with diet alone, but a vitamin D supplement is usually necessary to meet this goal.  · When exposed to the sun, use a sunscreen that protects against both UVA and UVB radiation with an SPF of 30 or greater. Reapply every 2 to 3 hours or after sweating, drying off with a towel, or swimming. · Always wear a seat belt when traveling in a car. Always wear a helmet when riding a bicycle or motorcycle. Heart-Healthy Diet   Sodium, Fat, and Cholesterol Controlled Diet       What Is a Heart Healthy Diet?    A heart-healthy diet is one that limits sodium , certain types of fat , and cholesterol . This type of diet is recommended for:   People with any form of cardiovascular disease (eg, coronary heart disease , peripheral vascular disease , previous heart attack , previous stroke )   People with risk factors for cardiovascular disease, such as high blood pressure , high cholesterol , or diabetes   Anyone who wants to lower their risk of developing cardiovascular disease   Sodium    Sodium is a mineral found in many foods. In general, most people consume much more sodium than they need. Diets high in sodium can increase blood pressure and lead to edema (water retention). On a heart-healthy diet, you should consume no more than 2,300 mg (milligrams) of sodium per dayabout the amount in one teaspoon of table salt. The foods highest in sodium include table salt (about 50% sodium), processed foods, convenience foods, and preserved foods. Cholesterol    Cholesterol is a fat-like, waxy substance in your blood. Our bodies make some cholesterol. It is also found in animal products, with the highest amounts in fatty meat, egg yolks, whole milk, cheese, shellfish, and organ meats. On a heart-healthy diet, you should limit your cholesterol intake to less than 200 mg per day. It is normal and important to have some cholesterol in your bloodstream. But too much cholesterol can cause plaque to build up within your arteries, which can eventually lead to a heart attack or stroke. The two types of cholesterol that are most commonly referred to are:   Low-density lipoprotein (LDL) cholesterol  Also known as bad cholesterol, this is the cholesterol that tends to build up along your arteries. Bad cholesterol levels are increased by eating fats that are saturated or hydrogenated. Optimal level of this cholesterol is less than 100. Over 130 starts to get risky for heart disease.    High-density lipoprotein (HDL) cholesterol  Also known as good you have a form from a different state. You don't need an  to complete a living will. But legal advice can be helpful if your state's laws are unclear, your health history is complicated, or your family can't agree on what should be in your living will. You can change your living will at any time. Some people find that their wishes about end-of-life care change as their health changes. In addition to making a living will, think about completing a medical power of  form. This form lets you name the person you want to make end-of-life treatment decisions for you (your \"health care agent\") if you're not able to. Many hospitals and nursing homes will give you the forms you need to complete a living will and a medical power of . Your living will is used only if you can't make or communicate decisions for yourself anymore. If you become able to make decisions again, you can accept or refuse any treatment, no matter what you wrote in your living will. Your state may offer an online registry. This is a place where you can store your living will online so the doctors and nurses who need to treat you can find it right away. What should you think about when creating a living will? Talk about your end-of-life wishes with your family members and your doctor. Let them know what you want. That way the people making decisions for you won't be surprised by your choices. Think about these questions as you make your living will:  Do you know enough about life support methods that might be used? If not, talk to your doctor so you know what might be done if you can't breathe on your own, your heart stops, or you're unable to swallow. What things would you still want to be able to do after you receive life-support methods? Would you want to be able to walk? To speak? To eat on your own? To live without the help of machines? If you have a choice, where do you want to be cared for? In your home?  At a

## 2019-09-08 DIAGNOSIS — L29.9 GENERALIZED PRURITUS: Primary | ICD-10-CM

## 2019-09-09 ENCOUNTER — TELEPHONE (OUTPATIENT)
Dept: PRIMARY CARE CLINIC | Age: 74
End: 2019-09-09

## 2019-09-11 DIAGNOSIS — G62.9 NEUROPATHY: Primary | ICD-10-CM

## 2019-09-11 RX ORDER — GABAPENTIN 300 MG/1
CAPSULE ORAL
Qty: 30 CAPSULE | Refills: 0 | Status: SHIPPED | OUTPATIENT
Start: 2019-09-11 | End: 2019-11-26 | Stop reason: SINTOL

## 2019-09-19 DIAGNOSIS — Z12.31 ENCOUNTER FOR SCREENING MAMMOGRAM FOR BREAST CANCER: ICD-10-CM

## 2019-09-23 DIAGNOSIS — Z78.0 POST-MENOPAUSAL: ICD-10-CM

## 2019-11-12 RX ORDER — LISINOPRIL 10 MG/1
TABLET ORAL
Qty: 90 TABLET | Refills: 4 | Status: SHIPPED | OUTPATIENT
Start: 2019-11-12 | End: 2021-02-03 | Stop reason: SDUPTHER

## 2019-11-12 RX ORDER — LEVOTHYROXINE SODIUM 0.05 MG/1
TABLET ORAL
Qty: 90 TABLET | Refills: 4 | Status: SHIPPED | OUTPATIENT
Start: 2019-11-12 | End: 2020-12-03

## 2019-11-26 ENCOUNTER — OFFICE VISIT (OUTPATIENT)
Dept: NEUROLOGY | Age: 74
End: 2019-11-26
Payer: MEDICARE

## 2019-11-26 VITALS
HEART RATE: 75 BPM | WEIGHT: 233 LBS | DIASTOLIC BLOOD PRESSURE: 87 MMHG | SYSTOLIC BLOOD PRESSURE: 144 MMHG | HEIGHT: 70 IN | BODY MASS INDEX: 33.36 KG/M2

## 2019-11-26 DIAGNOSIS — G62.9 NEUROPATHY: Primary | ICD-10-CM

## 2019-11-26 DIAGNOSIS — E53.9 BURNING FEET SYNDROME: ICD-10-CM

## 2019-11-26 DIAGNOSIS — M54.2 PAIN, NECK: ICD-10-CM

## 2019-11-26 DIAGNOSIS — G44.89 OTHER HEADACHE SYNDROME: ICD-10-CM

## 2019-11-26 PROCEDURE — G8427 DOCREV CUR MEDS BY ELIG CLIN: HCPCS | Performed by: PSYCHIATRY & NEUROLOGY

## 2019-11-26 PROCEDURE — 1036F TOBACCO NON-USER: CPT | Performed by: PSYCHIATRY & NEUROLOGY

## 2019-11-26 PROCEDURE — G8399 PT W/DXA RESULTS DOCUMENT: HCPCS | Performed by: PSYCHIATRY & NEUROLOGY

## 2019-11-26 PROCEDURE — G8484 FLU IMMUNIZE NO ADMIN: HCPCS | Performed by: PSYCHIATRY & NEUROLOGY

## 2019-11-26 PROCEDURE — 4040F PNEUMOC VAC/ADMIN/RCVD: CPT | Performed by: PSYCHIATRY & NEUROLOGY

## 2019-11-26 PROCEDURE — 99213 OFFICE O/P EST LOW 20 MIN: CPT | Performed by: PSYCHIATRY & NEUROLOGY

## 2019-11-26 PROCEDURE — 1123F ACP DISCUSS/DSCN MKR DOCD: CPT | Performed by: PSYCHIATRY & NEUROLOGY

## 2019-11-26 PROCEDURE — G8417 CALC BMI ABV UP PARAM F/U: HCPCS | Performed by: PSYCHIATRY & NEUROLOGY

## 2019-11-26 PROCEDURE — 1090F PRES/ABSN URINE INCON ASSESS: CPT | Performed by: PSYCHIATRY & NEUROLOGY

## 2019-11-26 RX ORDER — TRAMADOL HYDROCHLORIDE 50 MG/1
50 TABLET ORAL NIGHTLY
COMMUNITY
End: 2020-03-11

## 2020-03-11 RX ORDER — TRAMADOL HYDROCHLORIDE 50 MG/1
TABLET ORAL
Qty: 30 TABLET | Refills: 5 | Status: SHIPPED | OUTPATIENT
Start: 2020-03-11 | End: 2020-09-09 | Stop reason: SDUPTHER

## 2020-03-11 NOTE — TELEPHONE ENCOUNTER
Requested Prescriptions     Pending Prescriptions Disp Refills    traMADol (ULTRAM) 50 MG tablet [Pharmacy Med Name: tramadol 50 mg tablet] 30 tablet 5     Sig: TAKE ONE TABLET BY MOUTH EVERY EVENING       Last Office Visit: 11/26/2019  Next Office Visit: 3/13/2020  Last Medication Refill: 8/26/19 with 5 refills

## 2020-03-13 ENCOUNTER — OFFICE VISIT (OUTPATIENT)
Dept: NEUROLOGY | Age: 75
End: 2020-03-13
Payer: MEDICARE

## 2020-03-13 VITALS
HEIGHT: 70 IN | DIASTOLIC BLOOD PRESSURE: 85 MMHG | BODY MASS INDEX: 33.87 KG/M2 | SYSTOLIC BLOOD PRESSURE: 126 MMHG | RESPIRATION RATE: 16 BRPM | HEART RATE: 67 BPM | WEIGHT: 236.6 LBS

## 2020-03-13 PROCEDURE — G8399 PT W/DXA RESULTS DOCUMENT: HCPCS | Performed by: PSYCHIATRY & NEUROLOGY

## 2020-03-13 PROCEDURE — 1036F TOBACCO NON-USER: CPT | Performed by: PSYCHIATRY & NEUROLOGY

## 2020-03-13 PROCEDURE — G8427 DOCREV CUR MEDS BY ELIG CLIN: HCPCS | Performed by: PSYCHIATRY & NEUROLOGY

## 2020-03-13 PROCEDURE — 4040F PNEUMOC VAC/ADMIN/RCVD: CPT | Performed by: PSYCHIATRY & NEUROLOGY

## 2020-03-13 PROCEDURE — G8484 FLU IMMUNIZE NO ADMIN: HCPCS | Performed by: PSYCHIATRY & NEUROLOGY

## 2020-03-13 PROCEDURE — 99213 OFFICE O/P EST LOW 20 MIN: CPT | Performed by: PSYCHIATRY & NEUROLOGY

## 2020-03-13 PROCEDURE — 1123F ACP DISCUSS/DSCN MKR DOCD: CPT | Performed by: PSYCHIATRY & NEUROLOGY

## 2020-03-13 PROCEDURE — 1090F PRES/ABSN URINE INCON ASSESS: CPT | Performed by: PSYCHIATRY & NEUROLOGY

## 2020-03-13 PROCEDURE — G8417 CALC BMI ABV UP PARAM F/U: HCPCS | Performed by: PSYCHIATRY & NEUROLOGY

## 2020-03-13 NOTE — PROGRESS NOTES
Chief Complaint   Patient presents with   Baron Chatman is a 66y.o. year old female who is seen for evaluation of Burning feet. This has been present for around 2015 or so. It is typically worse at night when she lays down. She sometimes feels that the nerve endings to her body are not normal. She does have some chronic neck pain issues and some occasional low back pain issues. She denies any weakness. She indicates her mother and diabetes and neuropathy. She denies any exposure to chemicals, toxins, or pesticides. She has tried Cymbalta or amitriptyline in the past. Tramadol does help. Working more in yard and more back pain. Stretching helps. Did not tolerate trileptal. Going to chiropractor helps neck and back. Gabapentin made her sick. Tramadol still helps only one at night. .Cut back on sugar and going to chiropractor. . Itching flared up and now better. Overall doing well. Not taking Klonopin. Tramadol at night helps.     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/2018    Pain, neck 03/21/2018    Other headache syndrome 02/06/2019     Resolved Ambulatory Problems     Diagnosis Date Noted    No Resolved Ambulatory Problems     Past Medical History:   Diagnosis Date    Back pain     Hypertension     Hypothyroidism        Past Surgical History:   Procedure Laterality Date    CHOLECYSTECTOMY      COLONOSCOPY      1-09    TUBAL LIGATION         Family History   Problem Relation Age of Onset    Diabetes Mother     High Blood Pressure Mother     Alzheimer's Disease Mother     Alzheimer's Disease Father     Cancer Sister         colon       Allergies   Allergen Reactions    Neurontin [Gabapentin] weakness. Hematologic - no easy bruising or excessive bleeding. Psychiatric - no severe anxiety or nervousness. All other review of systems are negative. Current Outpatient Medications   Medication Sig Dispense Refill    traMADol (ULTRAM) 50 MG tablet TAKE ONE TABLET BY MOUTH EVERY EVENING 30 tablet 5    levothyroxine (SYNTHROID) 50 MCG tablet TAKE 1 TABLET DAILY FOR LOW THYROID 90 tablet 4    lisinopril (PRINIVIL;ZESTRIL) 10 MG tablet TAKE 1 TABLET DAILY FOR HIGH BLOOD PRESSURE 90 tablet 4    esomeprazole (NEXIUM) 40 MG delayed release capsule Take 1 capsule by mouth daily Stop protonix 30 capsule 3    Vitamin D (CHOLECALCIFEROL) 1000 UNITS CAPS capsule Take 1,000 Units by mouth daily       No current facility-administered medications for this visit. /85   Pulse 67   Resp 16   Ht 5' 9.6\" (1.768 m)   Wt 236 lb 9.6 oz (107.3 kg)   BMI 34.34 kg/m²     Constitutional - well developed, well nourished. Eyes - conjunctiva normal.    Ear, nose, throat - No scars, masses, or lesions over external nose or ears, no atrophy of tongue  Neck-symmetric, no masses noted, no jugular vein distension  Respiration- chest wall appears symmetric, good expansion,   normal effort without use of accessory muscles  Musculoskeletal - no significant wasting of muscles noted, no bony deformities  Extremities-no clubbing, cyanosis or edema  Skin - warm, dry, and intact. No rash, erythema, or pallor.   Psychiatric - mood, affect, and behavior appear normal.      Neurological exam  Awake, alert, fluent oriented  appropriate affect  Attention and concentration appear appropriate  Recent and remote memory appears unremarkable  Speech normal without dysarthria  No clear issues with language of fund of knowledge    Cranial Nerve Exam     CN III, IV,VI-EOMI, No nystagmus, conjugate eye movements, no ptosis    CN VII-no facial assymetry    Motor Exam  antigravity throughout upper and lower extremities bilaterally, no cogwheeling, normal tone          Gait  Normal base and speed  No ataxia    Coordination  Finger to nose-unremarkable    Lab Results   Component Value Date    [id number] 534 03/21/2018     Lab Results   Component Value Date    WBC 5.8 09/05/2019    HGB 13.6 09/05/2019    HCT 42.4 09/05/2019    MCV 97.2 09/05/2019     09/05/2019     Lab Results   Component Value Date     09/05/2019    K 4.2 09/05/2019     09/05/2019    CO2 25 09/05/2019    BUN 17 09/05/2019    CREATININE 1.0 (H) 09/05/2019    GLUCOSE 97 09/05/2019    CALCIUM 9.7 09/05/2019    PROT 8.1 09/05/2019    LABALBU 4.4 09/05/2019    BILITOT 0.6 09/05/2019    ALKPHOS 78 09/05/2019    AST 17 09/05/2019    ALT 14 09/05/2019    LABGLOM 54 (A) 09/05/2019    GLOB 3.1 03/30/2017       Impression: The findings of this study are 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           ___________________________  Ne Murcia M.D. Board certified in Neurology  Board certified in Clinical Neurophysiology  Fellowship trained in [facility] [address]    1. Neuropathy G62.9    2. Burning feet syndrome E53.9    3. Pain, neck M54.2    4. Other headache syndrome G44.89        Her neurological examination today was unremarkable except for some reduced reflexes in the extremities. Based upon her history and examination I am suspicious for a peripheral neuropathy. Her EMG with nerve conduction study of the legs was suggestive of a sensory neuropathy. Her lab work was unremarkable. She will be continued on tramadol as this provides benefit. Marilyn Diego He is to check with her pharmacy to see if they changes her generic of tramadol. The patient indicates understanding of the management plan. She is to follow-up with me in approximately 6 month and call with any further problems. continue present care      Plan    No orders of the defined types were placed in this encounter.       No orders of the defined types were placed in this encounter. Return in about 6 months (around 9/13/2020). EMR Dragon/transcription disclaimer:Significant part of this  encounter note is electronic transcription/translation of spoken language to printed text. The electronic translation of spoken language may be erroneous, or at times, nonsensical words or phrases may be inadvertently transcribed.  Although I have reviewed the note for such errors, some may still exist.

## 2020-08-07 RX ORDER — PANTOPRAZOLE SODIUM 40 MG/1
TABLET, DELAYED RELEASE ORAL
Qty: 90 TABLET | Refills: 3 | Status: SHIPPED | OUTPATIENT
Start: 2020-08-07 | End: 2021-08-02

## 2020-09-09 NOTE — TELEPHONE ENCOUNTER
Raúl Silverman has requested a refill on her medication. Last office visit : 3/13/2020   Next office visit : 10/1/2020   Last medication refill : 3/11/20 with 5 refills   Dionne Tobias : up to date       Requested Prescriptions     Pending Prescriptions Disp Refills    traMADol (ULTRAM) 50 MG tablet 30 tablet 5     Sig: Take 1 tablet by mouth every evening for 30 days.

## 2020-09-10 RX ORDER — TRAMADOL HYDROCHLORIDE 50 MG/1
50 TABLET ORAL EVERY EVENING
Qty: 30 TABLET | Refills: 5 | Status: SHIPPED | OUTPATIENT
Start: 2020-09-10 | End: 2020-10-20 | Stop reason: SDUPTHER

## 2020-09-11 ENCOUNTER — OFFICE VISIT (OUTPATIENT)
Dept: PRIMARY CARE CLINIC | Age: 75
End: 2020-09-11
Payer: MEDICARE

## 2020-09-11 VITALS
HEIGHT: 70 IN | DIASTOLIC BLOOD PRESSURE: 86 MMHG | OXYGEN SATURATION: 98 % | WEIGHT: 233.6 LBS | RESPIRATION RATE: 18 BRPM | TEMPERATURE: 97.2 F | SYSTOLIC BLOOD PRESSURE: 128 MMHG | BODY MASS INDEX: 33.44 KG/M2 | HEART RATE: 63 BPM

## 2020-09-11 LAB
ALBUMIN SERPL-MCNC: 4.4 G/DL (ref 3.5–5.2)
ALP BLD-CCNC: 76 U/L (ref 35–104)
ALT SERPL-CCNC: 13 U/L (ref 5–33)
ANION GAP SERPL CALCULATED.3IONS-SCNC: 18 MMOL/L (ref 7–19)
AST SERPL-CCNC: 17 U/L (ref 5–32)
BILIRUB SERPL-MCNC: 0.6 MG/DL (ref 0.2–1.2)
BUN BLDV-MCNC: 20 MG/DL (ref 8–23)
CALCIUM SERPL-MCNC: 9.5 MG/DL (ref 8.8–10.2)
CHLORIDE BLD-SCNC: 100 MMOL/L (ref 98–111)
CHOLESTEROL, TOTAL: 176 MG/DL (ref 160–199)
CO2: 22 MMOL/L (ref 22–29)
CREAT SERPL-MCNC: 0.9 MG/DL (ref 0.5–0.9)
GFR AFRICAN AMERICAN: >59
GFR NON-AFRICAN AMERICAN: >60
GLUCOSE BLD-MCNC: 105 MG/DL (ref 74–109)
HCT VFR BLD CALC: 45 % (ref 37–47)
HDLC SERPL-MCNC: 61 MG/DL (ref 65–121)
HEMOGLOBIN: 14.4 G/DL (ref 12–16)
LDL CHOLESTEROL CALCULATED: 89 MG/DL
MCH RBC QN AUTO: 31.3 PG (ref 27–31)
MCHC RBC AUTO-ENTMCNC: 32 G/DL (ref 33–37)
MCV RBC AUTO: 97.8 FL (ref 81–99)
PDW BLD-RTO: 12.5 % (ref 11.5–14.5)
PLATELET # BLD: 210 K/UL (ref 130–400)
PMV BLD AUTO: 10.9 FL (ref 9.4–12.3)
POTASSIUM SERPL-SCNC: 4.3 MMOL/L (ref 3.5–5)
RBC # BLD: 4.6 M/UL (ref 4.2–5.4)
SODIUM BLD-SCNC: 140 MMOL/L (ref 136–145)
T4 FREE: 1.61 NG/DL (ref 0.93–1.7)
TOTAL PROTEIN: 8 G/DL (ref 6.6–8.7)
TRIGL SERPL-MCNC: 130 MG/DL (ref 0–149)
TSH SERPL DL<=0.05 MIU/L-ACNC: 1.28 UIU/ML (ref 0.27–4.2)
VITAMIN D 25-HYDROXY: 46.1 NG/ML
WBC # BLD: 5.8 K/UL (ref 4.8–10.8)

## 2020-09-11 PROCEDURE — 99214 OFFICE O/P EST MOD 30 MIN: CPT | Performed by: FAMILY MEDICINE

## 2020-09-11 PROCEDURE — G0439 PPPS, SUBSEQ VISIT: HCPCS | Performed by: FAMILY MEDICINE

## 2020-09-11 PROCEDURE — 36415 COLL VENOUS BLD VENIPUNCTURE: CPT | Performed by: FAMILY MEDICINE

## 2020-09-11 PROCEDURE — 1090F PRES/ABSN URINE INCON ASSESS: CPT | Performed by: FAMILY MEDICINE

## 2020-09-11 PROCEDURE — 1123F ACP DISCUSS/DSCN MKR DOCD: CPT | Performed by: FAMILY MEDICINE

## 2020-09-11 PROCEDURE — G8427 DOCREV CUR MEDS BY ELIG CLIN: HCPCS | Performed by: FAMILY MEDICINE

## 2020-09-11 PROCEDURE — 4040F PNEUMOC VAC/ADMIN/RCVD: CPT | Performed by: FAMILY MEDICINE

## 2020-09-11 PROCEDURE — 1036F TOBACCO NON-USER: CPT | Performed by: FAMILY MEDICINE

## 2020-09-11 PROCEDURE — G8417 CALC BMI ABV UP PARAM F/U: HCPCS | Performed by: FAMILY MEDICINE

## 2020-09-11 PROCEDURE — G8399 PT W/DXA RESULTS DOCUMENT: HCPCS | Performed by: FAMILY MEDICINE

## 2020-09-11 ASSESSMENT — PATIENT HEALTH QUESTIONNAIRE - PHQ9
SUM OF ALL RESPONSES TO PHQ QUESTIONS 1-9: 0
2. FEELING DOWN, DEPRESSED OR HOPELESS: 0
SUM OF ALL RESPONSES TO PHQ9 QUESTIONS 1 & 2: 0
SUM OF ALL RESPONSES TO PHQ QUESTIONS 1-9: 0
1. LITTLE INTEREST OR PLEASURE IN DOING THINGS: 0

## 2020-09-11 ASSESSMENT — LIFESTYLE VARIABLES: HOW OFTEN DO YOU HAVE A DRINK CONTAINING ALCOHOL: 0

## 2020-09-11 NOTE — PATIENT INSTRUCTIONS
Personalized Preventive Plan for Donald Bangura - 9/11/2020  Medicare offers a range of preventive health benefits. Some of the tests and screenings are paid in full while other may be subject to a deductible, co-insurance, and/or copay. Some of these benefits include a comprehensive review of your medical history including lifestyle, illnesses that may run in your family, and various assessments and screenings as appropriate. After reviewing your medical record and screening and assessments performed today your provider may have ordered immunizations, labs, imaging, and/or referrals for you. A list of these orders (if applicable) as well as your Preventive Care list are included within your After Visit Summary for your review. Other Preventive Recommendations:    · A preventive eye exam performed by an eye specialist is recommended every 1-2 years to screen for glaucoma; cataracts, macular degeneration, and other eye disorders. · A preventive dental visit is recommended every 6 months. · Try to get at least 150 minutes of exercise per week or 10,000 steps per day on a pedometer . · Order or download the FREE \"Exercise & Physical Activity: Your Everyday Guide\" from [employer]. Call 8-656.936.1834 or search [employer] online. · You need 2405-6643 mg of calcium and 6647-2266 IU of vitamin D per day. It is possible to meet your calcium requirement with diet alone, but a vitamin D supplement is usually necessary to meet this goal.  · When exposed to the sun, use a sunscreen that protects against both UVA and UVB radiation with an SPF of 30 or greater. Reapply every 2 to 3 hours or after sweating, drying off with a towel, or swimming. · Always wear a seat belt when traveling in a car. Always wear a helmet when riding a bicycle or motorcycle.     Keeping Home a [facility]       As we get older, changes in balance, gait, strength, vision, hearing, and cognition make even the most youthful senior more prone to accidents. Falls are one of the leading health risks for older people. This increased risk of falling is related to:   Aging process (eg, decreased muscle strength, slowed reflexes)   Higher incidence of chronic health problems (eg, arthritis, diabetes) that may limit mobility, agility or sensory awareness   Side effects of medicine (eg, dizziness, blurred vision)especially medicines like prescription pain medicines and drugs used to treat mental health conditions   Depending on the brittleness of your bones, the consequences of a fall can be serious and long lasting. Home Life   Research by the Association of Aging [facility]) shows that some home accidents among older adults can be prevented by making simple lifestyle changes and basic modifications and repairs to the home environment. Here are some lifestyle changes that experts recommend:   Have your hearing and vision checked regularly. Be sure to wear prescription glasses that are right for you. Speak to your doctor or pharmacist about the possible side effects of your medicines. A number of medicines can cause dizziness. If you have problems with sleep, talk to your doctor. Limit your intake of alcohol. If necessary, use a cane or walker to help maintain your balance. Wear supportive, rubber-soled shoes, even at home. If you live in a region that gets wintry weather, you may want to put special cleats on your shoes to prevent you from slipping on the snow and ice. Exercise regularly to help maintain muscle tone, agility, and balance. Always hold the banister when going up or down stairs. Also, use  bars when getting in or out of the bath or shower, or using the toilet. To avoid dizziness, get up slowly from a lying down position. Sit up first, dangling your legs for a minute or two before rising to a standing position.    Overall Home Safety Check   According to the Consumer Product Safety Commision's \"Older Consumer Home Safety Checklist,\" it is important to check for potential hazards in each room. And remember, proper lighting is an essential factor in home safety. If you cannot see clearly, you are more likely to fall. Important questions to ask yourself include:   Are lamp, electric, extension, and telephone cords placed out of the flow of traffic and maintained in good condition? Have frayed cords been replaced? Are all small rugs and runners slip resistant? If not, you can secure them to the floor with a special double-sided carpet tape. Are smoke detectors properly locatedone on every floor of your home and one outside of every sleeping area? Are they in good working order? Are batteries replaced at least once a year? Do you have a well-maintained carbon monoxide detector outside every sleeping are in your home? Does your furniture layout leave plenty of space to maneuver between and around chairs, tables, beds, and sofas? Are hallways, stairs and passages between rooms well lit? Can you reach a lamp without getting out of bed? Are floor surfaces well maintained? Shag rugs, high-pile carpeting, tile floors, and polished wood floors can be particularly slippery. Stairs should always have handrails and be carpeted or fitted with a non-skid tread. Is your telephone easily reachable. Is the cord safely tucked away? Room by Room   According to the Association of Aging, bathrooms and sharri are the two most potentially hazardous rooms in your home. In the Kitchen    Be sure your stove is in proper working order and always make sure burners and the oven are off before you go out or go to sleep. Keep pots on the back burners, turn handles away from the front of the stove, and keep stove clean and free of grease build-up. Kitchen ventilation systems and range exhausts should be working properly.     Keep flammable objects such as towels and pot holders away from the cooking voice-response system. You should also make arrangements to stay in contact with someonefriend, neighbor, family memberon a regular schedule. Fire Prevention   According to the [place]. (Smoke Alarms for Every) [facility], senior citizens are one of the two highest risk groups for death and serious injuries due to residential fires. When cooking, wear short-sleeved items, never a bulky long-sleeved robe. The [facility]'s Safety Checklist for Older Consumers emphasizes the importance of checking basements, garages, workshops and storage areas for fire hazards, such as volatile liquids, piles of old rags or clothing and overloaded circuits. Never smoke in bed or when lying down on a couch or recliner chair. Small portable electric or kerosene heaters are responsible for many home fires and should be used cautiously if at all. If you do use one, be sure to keep them away from flammable materials. In case of fire, make sure you have a pre-established emergency exit plan. Have a professional check your fireplace and other fuel-burning appliances yearly. Helping Hands   Baby boomers entering the mcmahan years will continue to see the development of new products to help older adults live safely and independently in spite of age-related changes. Making Life More Livable  , by Aditi Callaway, lists over 1,000 products for \"living well in the mature years,\" such as bathing and mobility aids, household security devices, ergonomically designed knives and peelers, and faucet valves and knobs for temperature control. Medical supply stores and organizations are good sources of information about products that improve your quality of life and insure your safety. Last Reviewed: November 2009 Cesar Cotton MD   Updated: 3/7/2011     ·        Learning About Living [place]  What is a living will? A living will, also called a declaration, is a legal form.  It tells your family and your doctor your wishes when you can't speak for yourself. It's used by the health professionals who will treat you as you near the end of your life or if you get seriously hurt or ill. If you put your wishes in writing, your loved ones and others will know what kind of care you want. They won't need to guess. This can ease your mind and be helpful to others. And you can change or cancel your living will at any time. A living will is not the same as an estate or property will. An estate will explains what you want to happen with your money and property after you die. How do you use it? A living will is used to describe the kinds of treatment or life support you want as you near the end of your life or if you get seriously hurt or ill. Keep these facts in mind about living odell. Your living will is used only if you can't speak or make decisions for yourself. Most often, one or more doctors must certify that you can't speak or decide for yourself before your living will takes effect. If you get better and can speak for yourself again, you can accept or refuse any treatment. It doesn't matter what you said in your living will. Some states may limit your right to refuse treatment in certain cases. For example, you may need to clearly state in your living will that you don't want artificial hydration and nutrition, such as being fed through a tube. Is a living will a legal document? A living will is a legal document. Each state has its own laws about living odell. And a living will may be called something else in your state. Here are some things to know about living odell. You don't need an  to complete a living will. But legal advice can be helpful if your state's laws are unclear. It can also help if your health history is complicated or your family can't agree on what should be in your living will. You can change your living will at any time.  Some people find that their wishes about end-of-life care change as their health changes. If you make big changes to your living will, complete a new form. If you move to another state, make sure that your living will is legal in the state where you now live. In most cases, doctors will respect your wishes even if you have a form from a different state. You might use a universal form that has been approved by many states. This kind of form can sometimes be filled out and stored online. Your digital copy will then be available wherever you have a connection to the internet. The doctors and nurses who need to treat you can find it right away. Your state may offer an online registry. This is another place where you can store your living will online. It's a good idea to get your living will notarized. This means using a person called a Meal Mantra to watch two people sign, or witness, your living will. What should you know when you create a living will? Here are some questions to ask yourself as you make your living will:  Do you know enough about life support methods that might be used? If not, talk to your doctor so you know what might be done if you can't breathe on your own, your heart stops, or you can't swallow. What things would you still want to be able to do after you receive life-support methods? Would you want to be able to walk? To speak? To eat on your own? To live without the help of machines? Do you want certain Sikh practices performed if you become very ill? If you have a choice, where do you want to be cared for? In your home? At a hospital or nursing home? If you have a choice at the end of your life, where would you prefer to die? At home? In a hospital or nursing home? Somewhere else? Would you prefer to be buried or cremated? Do you want your organs to be donated after you die? What should you do with your living will?   Make sure that your family members and your health care agent have copies of your living will (also called a

## 2020-09-11 NOTE — PROGRESS NOTES
Medicare Annual Wellness Visit  Name: Norma Poster Date: 2020   MRN: [id number] Sex: Female   Age: 66 y.o. Ethnicity: Non-/Non    : 1945 Race: Bernard Locke is here for Medicare AWV and Medication Refill    Screenings for behavioral, psychosocial and functional/safety risks, and cognitive dysfunction are all negative except as indicated below. These results, as well as other patient data from the [address] form, are documented in Flowsheets linked to this Encounter. Allergies   Allergen Reactions    Neurontin [Gabapentin] Nausea Only       Prior to Visit Medications    Medication Sig Taking? Authorizing Provider   traMADol (ULTRAM) 50 MG tablet Take 1 tablet by mouth every evening for 30 days.  Yes Cristian José MD   pantoprazole (PROTONIX) 40 MG tablet TAKE 1 TABLET DAILY FOR STOMACH Yes Cristina Burnett MD   levothyroxine (SYNTHROID) 50 MCG tablet TAKE 1 TABLET DAILY FOR LOW THYROID Yes Cristina Burnett MD   lisinopril (PRINIVIL;ZESTRIL) 10 MG tablet TAKE 1 TABLET DAILY FOR HIGH BLOOD PRESSURE Yes Cristina Burnett MD   Vitamin D (CHOLECALCIFEROL) 1000 UNITS CAPS capsule Take 1,000 Units by mouth daily Yes Historical Provider, MD       Past Medical History:   Diagnosis Date    Back pain     GERD (gastroesophageal reflux disease)     Hypertension     Hypothyroidism     thyroid nodule    Neuropathy     Xerosis of skin        Past Surgical History:   Procedure Laterality Date    CHOLECYSTECTOMY      COLONOSCOPY          TUBAL LIGATION         Family History   Problem Relation Age of Onset    Diabetes Mother     High Blood Pressure Mother     Alzheimer's Disease Mother     Alzheimer's Disease Father     Cancer Sister         colon       CareTeam (Including outside providers/suppliers regularly involved in providing care):   Patient Care Team:  Cristina Burnett MD as PCP - General (Family Medicine)  Cristina Burnett MD as PCP - REHABILITATION [facility] Provider  Edith Clark MD as Neurologist (Neurology)    Wt Readings from Last 3 Encounters:   09/11/20 233 lb 9.6 oz (106 kg)   03/13/20 236 lb 9.6 oz (107.3 kg)   11/26/19 233 lb (105.7 kg)     Vitals:    09/11/20 0931   BP: 128/86   Pulse: 63   Resp: 18   Temp: 97.2 °F (36.2 °C)   SpO2: 98%   Weight: 233 lb 9.6 oz (106 kg)   Height: 5' 9.5\" (1.765 m)     Body mass index is 34 kg/m². Based upon direct observation of the patient, evaluation of cognition reveals recent and remote memory intact. Patient's complete Health Risk Assessment and screening values have been reviewed and are found in Flowsheets. The following problems were reviewed today and where indicated follow up appointments were made and/or referrals ordered. Positive Risk Factor Screenings with Interventions:     General Health:  General  In general, how would you say your health is?: Very Good  In the past 7 days, have you experienced any of the following? New or Increased Pain, New or Increased Fatigue, Loneliness, Social Isolation, Stress or Anger?: None of These  Do you get the social and emotional support that you need?: Yes  Do you have a Living Will?: (!) No  General Health Risk Interventions:  · No Living Will: provided the state-specific advance directive document to the patient    Health Habits/Nutrition:  Health Habits/Nutrition  Do you exercise for at least 20 minutes 2-3 times per week?: (!) No  Have you lost any weight without trying in the past 3 months?: No  Do you eat fewer than 2 meals per day?: No  Have you seen a dentist within the past year?: Yes  Body mass index is 34 kg/m².   Health Habits/Nutrition Interventions:  · Inadequate physical activity:  We did advise her to start increasing her walking    Safety:  Safety  Do you have working smoke detectors?: (!) No  Have all throw rugs been removed or fastened?: Yes  Do you have non-slip mats or surfaces in all bathtubs/showers?: Yes  Do all of your stairways have a railing or banister?: Yes  Are your doorways, halls and stairs free of clutter?: Yes  Do you always fasten your seatbelt when you are in a car?: Yes  Safety Interventions:  · Home safety tips provided    Personalized Preventive Plan   Current Health Maintenance Status  Immunization History   Administered Date(s) Administered    Pneumococcal Conjugate 13-valent ([id number]) 01/04/2016    Pneumococcal Polysaccharide ([id number]) 01/30/2012    Tdap (Boostrix, Adacel) 12/12/2007        Health Maintenance   Topic Date Due    Shingles Vaccine (1 of 2) 06/23/1993    DTaP/Tdap/Td vaccine (2 - Td) 12/12/2017    Colon cancer screen colonoscopy  01/05/2019    Annual Wellness Visit (AWV)  06/23/2019    Flu vaccine (1) 09/01/2020    TSH testing  09/05/2020    Potassium monitoring  09/05/2020    Creatinine monitoring  09/05/2020    DEXA (modify frequency per FRAX score)  Completed    Pneumococcal 65+ years Vaccine  Completed    Hepatitis A vaccine  Aged Out    Hepatitis B vaccine  Aged Out    Hib vaccine  Aged Out    Meningococcal (ACWY) vaccine  Aged Out     Recommendations for [employer] Due: see orders and patient instructions/AVS.  . Recommended screening schedule for the next 5-10 years is provided to the patient in written form: see Patient Instructions/AVS.    Car Fung was seen today for medicare awv and medication refill. Diagnoses and all orders for this visit:    Encounter for screening mammogram for breast cancer  -     [facility] DIGITAL SCREEN W OR WO CAD BILATERAL;  Future

## 2020-09-11 NOTE — PROGRESS NOTES
SUBJECTIVE:   66 y.o. female for annual routine Pap and checkup. She actually came in for a Medicare annual wellness which was done but she has other problems we follow including essential hypertension, osteopenia and vitamin D deficiency. She does see Dr. Viki Carter every 6 months for her Ultram.  This does control her back pain. She does not take it every night. She also uses it for her neuropathy. We do not prescribe her controlled substance. She does have occasional lower back pain if she walks too much. It has actually been a very good year for her. She denies any change in bowel or bladder function. She denies any GYN symptoms. LMP: No LMP recorded. Patient is postmenopausal.  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 30 tablet 5    pantoprazole (PROTONIX) 40 MG tablet TAKE 1 TABLET DAILY FOR STOMACH 90 tablet 3    levothyroxine (SYNTHROID) 50 MCG tablet TAKE 1 TABLET DAILY FOR LOW THYROID 90 tablet 4    lisinopril (PRINIVIL;ZESTRIL) 10 MG tablet TAKE 1 TABLET DAILY FOR HIGH BLOOD PRESSURE 90 tablet 4    Vitamin D (CHOLECALCIFEROL) 1000 UNITS CAPS capsule Take 1,000 Units by mouth daily       No current facility-administered medications for this visit.       Past Medical History:   Diagnosis Date    Back pain     GERD (gastroesophageal reflux disease)     Hypertension     Hypothyroidism     thyroid nodule    Neuropathy     Xerosis of skin      Past Surgical History:   Procedure Laterality Date    CHOLECYSTECTOMY      COLONOSCOPY      1-09    TUBAL LIGATION       Family History   Problem Relation Age of Onset    Diabetes Mother     High Blood Pressure Mother     Alzheimer's Disease Mother     Alzheimer's Disease Father     Cancer Sister         colon     Social History     Tobacco Use    Smoking status: Never Smoker    Smokeless tobacco: Never Used   Substance Use Topics    Alcohol use: No       Allergies: Neurontin [gabapentin]    ROS:  Feeling well. No dyspnea or chest pain on exertion. No abdominal pain, change in bowel habits, black or bloody stools. No urinary tract symptoms. GYN ROS: none   No neurological complaints. All other systems negative. OBJECTIVE:   The patient appears well, alert, oriented x 3, in no distress. /86   Pulse 63   Temp 97.2 °F (36.2 °C)   Resp 18   Ht 5' 9.5\" (1.765 m)   Wt 233 lb 9.6 oz (106 kg)   SpO2 98%   BMI 34.00 kg/m²   Skin normal, no suspicious skin lesions. ENT normal.  Neck supple. No adenopathy or thyromegaly. COCO. Lungs are clear, good air entry, no wheezes, rhonchi or rales. S1 and S2 normal, no murmurs, regular rate and rhythm. Abdomen soft without tenderness, guarding, mass or organomegaly. Extremities show no edema, normal peripheral pulses. Neurological is normal, no focal findings. Psychiatric exam, no signs of depression. BREAST EXAM: Breasts symmetrical. No skin lesions. Nipples appear normal without discharge. No masses or axillary lymphadenopathy. No tenderness. PELVIC EXAM: Not examined today per patient's request.  We also did not examine it due to her lack of symptoms and the new guidelines. ASSESSMENT:   1. Routine general medical examination at a health care facility    2. Encounter for screening mammogram for breast cancer    3. Acquired hypothyroidism    4. Vitamin D deficiency    5. Essential hypertension    6. Burning feet syndrome    7.  Hypercholesterolemia        PLAN:   Lifestyle advice: discussed diet and exercise  MEDICATIONS:  No orders of the defined types were placed in this encounter. Continue current medications. We will refill when needed. ORDERS:  Orders Placed This Encounter   Procedures    UMU DIGITAL SCREEN W OR WO CAD BILATERAL    Comprehensive Metabolic Panel    Lipid Panel    TSH without Reflex    T4, Free    CBC    Vitamin D 25 Hydroxy     She seems to be doing very well. We will contact her when we get results of her blood work. Mammogram was scheduled. I strongly encouraged her to continue to walk. If she has any problems she is to let me know. We also talked about weight loss. Follow-up:  Return in 1 year (on 9/11/2021) for Medicare Annual Wellness Visit in 1 year. PATIENT INSTRUCTIONS:  Patient Instructions     Personalized Preventive Plan for Krys Walker - 9/11/2020  Medicare offers a range of preventive health benefits. Some of the tests and screenings are paid in full while other may be subject to a deductible, co-insurance, and/or copay. Some of these benefits include a comprehensive review of your medical history including lifestyle, illnesses that may run in your family, and various assessments and screenings as appropriate. After reviewing your medical record and screening and assessments performed today your provider may have ordered immunizations, labs, imaging, and/or referrals for you. A list of these orders (if applicable) as well as your Preventive Care list are included within your After Visit Summary for your review. Other Preventive Recommendations:    · A preventive eye exam performed by an eye specialist is recommended every 1-2 years to screen for glaucoma; cataracts, macular degeneration, and other eye disorders. · A preventive dental visit is recommended every 6 months. · Try to get at least 150 minutes of exercise per week or 10,000 steps per day on a pedometer .   · Order or download the FREE \"Exercise & Physical Activity: Your Everyday Guide\" from [employer]. Call 6-702.830.6520 or search [employer] online. · You need 5825-5665 mg of calcium and 9595-5066 IU of vitamin D per day. It is possible to meet your calcium requirement with diet alone, but a vitamin D supplement is usually necessary to meet this goal.  · When exposed to the sun, use a sunscreen that protects against both UVA and UVB radiation with an SPF of 30 or greater. Reapply every 2 to 3 hours or after sweating, drying off with a towel, or swimming. · Always wear a seat belt when traveling in a car. Always wear a helmet when riding a bicycle or motorcycle. Keeping Home a [facility]       As we get older, changes in balance, gait, strength, vision, hearing, and cognition make even the most youthful senior more prone to accidents. Falls are one of the leading health risks for older people. This increased risk of falling is related to:   Aging process (eg, decreased muscle strength, slowed reflexes)   Higher incidence of chronic health problems (eg, arthritis, diabetes) that may limit mobility, agility or sensory awareness   Side effects of medicine (eg, dizziness, blurred vision)especially medicines like prescription pain medicines and drugs used to treat mental health conditions   Depending on the brittleness of your bones, the consequences of a fall can be serious and long lasting. Home Life   Research by the Association of Aging [facility]) shows that some home accidents among older adults can be prevented by making simple lifestyle changes and basic modifications and repairs to the home environment. Here are some lifestyle changes that experts recommend:   Have your hearing and vision checked regularly. Be sure to wear prescription glasses that are right for you. Speak to your doctor or pharmacist about the possible side effects of your medicines. A number of medicines can cause dizziness.    If you have problems with sleep, talk to your doctor. Limit your intake of alcohol. If necessary, use a cane or walker to help maintain your balance. Wear supportive, rubber-soled shoes, even at home. If you live in a region that gets wintry weather, you may want to put special cleats on your shoes to prevent you from slipping on the snow and ice. Exercise regularly to help maintain muscle tone, agility, and balance. Always hold the banister when going up or down stairs. Also, use  bars when getting in or out of the bath or shower, or using the toilet. To avoid dizziness, get up slowly from a lying down position. Sit up first, dangling your legs for a minute or two before rising to a standing position. Overall Home Safety Check   According to the Consumer Product Safety Commision's \"Older Consumer Home Safety Checklist,\" it is important to check for potential hazards in each room. And remember, proper lighting is an essential factor in home safety. If you cannot see clearly, you are more likely to fall. Important questions to ask yourself include:   Are lamp, electric, extension, and telephone cords placed out of the flow of traffic and maintained in good condition? Have frayed cords been replaced? Are all small rugs and runners slip resistant? If not, you can secure them to the floor with a special double-sided carpet tape. Are smoke detectors properly locatedone on every floor of your home and one outside of every sleeping area? Are they in good working order? Are batteries replaced at least once a year? Do you have a well-maintained carbon monoxide detector outside every sleeping are in your home? Does your furniture layout leave plenty of space to maneuver between and around chairs, tables, beds, and sofas? Are hallways, stairs and passages between rooms well lit? Can you reach a lamp without getting out of bed? Are floor surfaces well maintained?  Shag rugs, high-pile carpeting, tile floors, and polished wood floors can be particularly slippery. Stairs should always have handrails and be carpeted or fitted with a non-skid tread. Is your telephone easily reachable. Is the cord safely tucked away? Room by Room   According to the Association of Aging, bathrooms and sharri are the two most potentially hazardous rooms in your home. In the Kitchen    Be sure your stove is in proper working order and always make sure burners and the oven are off before you go out or go to sleep. Keep pots on the back burners, turn handles away from the front of the stove, and keep stove clean and free of grease build-up. Kitchen ventilation systems and range exhausts should be working properly. Keep flammable objects such as towels and pot holders away from the cooking area except when in use. Make sure kitchen curtains are tied back. Move cords and appliances away from the sink and hot surfaces. If extension cords are needed, install wiring guides so they do not hang over the sink, range, or working areas. Look for coffee pots, kettles and toaster ovens with automatic shut-offs. Keep a mop handy in the kitchen so you can wipe up spills instantly. You should also have a small fire extinguisher. Arrange your kitchen with frequently used items on lower shelves to avoid the need to stand on a stepstool to reach them. Make sure countertops are well-lit to avoid injuries while cutting and preparing food. In the Bathroom    Use a non-slip mat or decals in the tub and shower, since wet, soapy tile or porcelain surfaces are extremely slippery. Make sure bathroom rugs are non-skid or tape them firmly to the floor. Bathtubs should have at least one, preferably two, grab bars, firmly attached to structural supports in the wall. (Do not use built-in soap holders or glass shower doors as grab bars.)    Tub seats fitted with non-slip material on the legs allow you to wash sitting down.  For people with limited mobility, bathtub transfer benches allow you to slide safely into the tub. Raised toilet seats and toilet safety rails are helpful for those with knee or hip problems. In the [place]    Make sure you use a nightlight and that the area around your bed is clear of potential obstacles. Be careful with electric blankets and never go to sleep with a heating pad, which can cause serious burns even if on a low setting. Use fire-resistant mattress covers and pillows, and NEVER smoke in bed. Keep a phone next to the bed that is programmed to dial 911 at the push of a button. If you have a chronic condition, you may want to sign on with an automatic call-in service. Typically the system includes a small pendant that connects directly to an emergency medical voice-response system. You should also make arrangements to stay in contact with someonefriend, neighbor, family memberon a regular schedule. Fire Prevention   According to the [employer]. (Smoke Alarms for Every) [address] [place] [address], senior citizens are one of the two highest risk groups for death and serious injuries due to residential fires. When cooking, wear short-sleeved items, never a bulky long-sleeved robe. The [facility]'s Safety Checklist for Older Consumers emphasizes the importance of checking basements, garages, workshops and storage areas for fire hazards, such as volatile liquids, piles of old rags or clothing and overloaded circuits. Never smoke in bed or when lying down on a couch or recliner chair. Small portable electric or kerosene heaters are responsible for many home fires and should be used cautiously if at all. If you do use one, be sure to keep them away from flammable materials. In case of fire, make sure you have a pre-established emergency exit plan. Have a professional check your fireplace and other fuel-burning appliances yearly.     Helping Hands   Baby boomers entering the mcmahan years will continue to see the development of new products to help older adults live safely and independently in spite of age-related changes. Making Life More Livable  , by Lynn Paiz, lists over 1,000 products for \"living well in the mature years,\" such as bathing and mobility aids, household security devices, ergonomically designed knives and peelers, and faucet valves and knobs for temperature control. Medical supply stores and organizations are good sources of information about products that improve your quality of life and insure your safety. Last Reviewed: November 2009 Kirit Crockett MD   Updated: 3/7/2011     ·        Learning About Living [place] Dg  What is a living will? A living will, also called a declaration, is a legal form. It tells your family and your doctor your wishes when you can't speak for yourself. It's used by the health professionals who will treat you as you near the end of your life or if you get seriously hurt or ill. If you put your wishes in writing, your loved ones and others will know what kind of care you want. They won't need to guess. This can ease your mind and be helpful to others. And you can change or cancel your living will at any time. A living will is not the same as an estate or property will. An estate will explains what you want to happen with your money and property after you die. How do you use it? A living will is used to describe the kinds of treatment or life support you want as you near the end of your life or if you get seriously hurt or ill. Keep these facts in mind about living odell. Your living will is used only if you can't speak or make decisions for yourself. Most often, one or more doctors must certify that you can't speak or decide for yourself before your living will takes effect. If you get better and can speak for yourself again, you can accept or refuse any treatment. It doesn't matter what you said in your living will.   Some states may limit your right to refuse treatment in certain cases. For example, you may need to clearly state in your living will that you don't want artificial hydration and nutrition, such as being fed through a tube. Is a living will a legal document? A living will is a legal document. Each state has its own laws about living odell. And a living will may be called something else in your state. Here are some things to know about living odell. You don't need an  to complete a living will. But legal advice can be helpful if your state's laws are unclear. It can also help if your health history is complicated or your family can't agree on what should be in your living will. You can change your living will at any time. Some people find that their wishes about end-of-life care change as their health changes. If you make big changes to your living will, complete a new form. If you move to another state, make sure that your living will is legal in the state where you now live. In most cases, doctors will respect your wishes even if you have a form from a different state. You might use a universal form that has been approved by many states. This kind of form can sometimes be filled out and stored online. Your digital copy will then be available wherever you have a connection to the internet. The doctors and nurses who need to treat you can find it right away. Your state may offer an online registry. This is another place where you can store your living will online. It's a good idea to get your living will notarized. This means using a person called a  to watch two people sign, or witness, your living will. What should you know when you create a living will? Here are some questions to ask yourself as you make your living will:  Do you know enough about life support methods that might be used?  If not, talk to your doctor so you know what might be done if you can't breathe on your own, your heart stops, or you can't swallow. What things would you still want to be able to do after you receive life-support methods? Would you want to be able to walk? To speak? To eat on your own? To live without the help of machines? Do you want certain [employer] practices performed if you become very ill? If you have a choice, where do you want to be cared for? In your home? At a hospital or nursing home? If you have a choice at the end of your life, where would you prefer to die? At home? In a hospital or nursing home? Somewhere else? Would you prefer to be buried or cremated? Do you want your organs to be donated after you die? What should you do with your living will? Make sure that your family members and your health care agent have copies of your living will (also called a declaration). Give your doctor a copy of your living will. Ask him or her to keep it as part of your medical record. If you have more than one doctor, make sure that each one has a copy. Put a copy of your living will where it can be easily found. For example, some people may put a copy on their refrigerator door. If you are using a digital copy, be sure your doctor, family members, and health care agent know how to find and access it. Where can you learn more? Go to [email or web address] org and sign in to your [employer] account. Enter D091 in the [facility] box to learn more about \"Learning About Living [place]. \"     If you do not have an account, please click on the \"Sign Up Now\" link. Current as of: December 9, 2019               Content Version: 12.5  © 3259-0199 [employer]. Care instructions adapted under license by [facility] ([facility]). If you have questions about a medical condition or this instruction, always ask your healthcare professional. [place] [address] any warranty or liability for your use of this information.     ·         EMR Dragon/transcription disclaimer:  Much of this encounter note is

## 2020-10-20 ENCOUNTER — OFFICE VISIT (OUTPATIENT)
Dept: NEUROLOGY | Age: 75
End: 2020-10-20
Payer: MEDICARE

## 2020-10-20 VITALS
HEART RATE: 75 BPM | DIASTOLIC BLOOD PRESSURE: 83 MMHG | HEIGHT: 70 IN | SYSTOLIC BLOOD PRESSURE: 155 MMHG | BODY MASS INDEX: 33.36 KG/M2 | WEIGHT: 233 LBS

## 2020-10-20 PROCEDURE — 1090F PRES/ABSN URINE INCON ASSESS: CPT | Performed by: PSYCHIATRY & NEUROLOGY

## 2020-10-20 PROCEDURE — G8417 CALC BMI ABV UP PARAM F/U: HCPCS | Performed by: PSYCHIATRY & NEUROLOGY

## 2020-10-20 PROCEDURE — 4040F PNEUMOC VAC/ADMIN/RCVD: CPT | Performed by: PSYCHIATRY & NEUROLOGY

## 2020-10-20 PROCEDURE — G8427 DOCREV CUR MEDS BY ELIG CLIN: HCPCS | Performed by: PSYCHIATRY & NEUROLOGY

## 2020-10-20 PROCEDURE — G8484 FLU IMMUNIZE NO ADMIN: HCPCS | Performed by: PSYCHIATRY & NEUROLOGY

## 2020-10-20 PROCEDURE — 1036F TOBACCO NON-USER: CPT | Performed by: PSYCHIATRY & NEUROLOGY

## 2020-10-20 PROCEDURE — G8399 PT W/DXA RESULTS DOCUMENT: HCPCS | Performed by: PSYCHIATRY & NEUROLOGY

## 2020-10-20 PROCEDURE — 99214 OFFICE O/P EST MOD 30 MIN: CPT | Performed by: PSYCHIATRY & NEUROLOGY

## 2020-10-20 PROCEDURE — 1123F ACP DISCUSS/DSCN MKR DOCD: CPT | Performed by: PSYCHIATRY & NEUROLOGY

## 2020-10-20 RX ORDER — TRAMADOL HYDROCHLORIDE 50 MG/1
50 TABLET ORAL EVERY EVENING
Qty: 30 TABLET | Refills: 5 | Status: SHIPPED | OUTPATIENT
Start: 2020-10-20 | End: 2020-11-19

## 2020-10-20 RX ORDER — AMITRIPTYLINE HYDROCHLORIDE 50 MG/1
50 TABLET, FILM COATED ORAL NIGHTLY
Qty: 30 TABLET | Refills: 5 | Status: SHIPPED | OUTPATIENT
Start: 2020-10-20 | End: 2021-09-26

## 2020-10-20 RX ORDER — TRAMADOL HYDROCHLORIDE 50 MG/1
50 TABLET ORAL NIGHTLY
COMMUNITY
End: 2021-04-12

## 2020-10-20 NOTE — PROGRESS NOTES
Chief Complaint   Patient presents with    Follow-up     7 month follow up        Radha Nguyen is a 66y.o. year old female who is seen for evaluation of Burning feet. This has been present for around 2015 or so. It is typically worse at night when she lays down. She sometimes feels that the nerve endings to her body are not normal. She does have some chronic neck pain issues and some occasional low back pain issues. She denies any weakness. She indicates her mother and diabetes and neuropathy. She denies any exposure to chemicals, toxins, or pesticides. She has tried Cymbalta or amitriptyline in the past. Tramadol does help. Working more in yard and more back pain. Stretching helps. Did not tolerate trileptal. Going to chiropractor helps neck and back. Gabapentin made her sick. Tramadol still helps only one at night. .Cut back on sugar and going to chiropractor. . Itching flared up and now better. Not taking Klonopin. Tramadol at night helps. Wakes up quite a bit at night.     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/2018    Pain, neck 03/21/2018    Other headache syndrome 02/06/2019     Resolved Ambulatory Problems     Diagnosis Date Noted    No Resolved Ambulatory Problems     Past Medical History:   Diagnosis Date    Back pain     Hypertension     Hypothyroidism        Past Surgical History:   Procedure Laterality Date    CHOLECYSTECTOMY      COLONOSCOPY      1-09    TUBAL LIGATION         Family History   Problem Relation Age of Onset    Diabetes Mother     High Blood Pressure Mother     Alzheimer's Disease Mother     Alzheimer's Disease Father     Cancer Sister         colon       Allergies   Allergen Reactions    Neurontin [Gabapentin] Nausea Only       Social History     Socioeconomic History    Marital status:      Spouse name: Not on file    Number of children: 1    Years of education: Not on file    Highest education level: Not on file   Occupational History    Occupation: retired    Social Needs    Financial resource strain: Not on file    Food insecurity     Worry: Not on file     Inability: Not on file   [employer] needs     Medical: Not on file     Non-medical: Not on file   Tobacco Use    Smoking status: Never Smoker    Smokeless tobacco: Never Used   Substance and Sexual Activity    Alcohol use: No    Drug use: No    Sexual activity: Yes     Partners: Male   Lifestyle    Physical activity     Days per week: Not on file     Minutes per session: Not on file    Stress: Not on file   Relationships    Social connections     Talks on phone: Not on file     Gets together: Not on file     Attends Holiness service: Not on file     Active member of club or organization: Not on file     Attends meetings of clubs or organizations: Not on file     Relationship status: Not on file    Intimate partner violence     Fear of current or ex partner: Not on file     Emotionally abused: Not on file     Physically abused: Not on file     Forced sexual activity: Not on file   Other Topics Concern    Not on file   Social History Narrative    Not on file     Review of Systems     Constitutional - No fever or chills. No diaphoresis or significant fatigue. HENT -  No tinnitus or significant hearing loss. Eyes - no sudden vision change or eye pain  Respiratory - no significant shortness of breath or cough  Cardiovascular - no chest pain No palpitations or significant leg swelling  Gastrointestinal - no abdominal swelling or pain. Genitourinary - No difficulty urinating, dysuria  Musculoskeletal - no back pain or myalgia. Skin - no color change or rash  Neurologic - No seizures. No lateralizing weakness. Hematologic - no easy bruising or excessive bleeding. Psychiatric - no severe anxiety or nervousness. All other review of systems are negative. Current Outpatient Medications   Medication Sig Dispense Refill    traMADol (ULTRAM) 50 MG tablet Take 50 mg by mouth nightly.  traMADol (ULTRAM) 50 MG tablet Take 1 tablet by mouth every evening for 30 days. 30 tablet 5    amitriptyline (ELAVIL) 50 MG tablet Take 1 tablet by mouth nightly 30 tablet 5    pantoprazole (PROTONIX) 40 MG tablet TAKE 1 TABLET DAILY FOR STOMACH 90 tablet 3    levothyroxine (SYNTHROID) 50 MCG tablet TAKE 1 TABLET DAILY FOR LOW THYROID 90 tablet 4    lisinopril (PRINIVIL;ZESTRIL) 10 MG tablet TAKE 1 TABLET DAILY FOR HIGH BLOOD PRESSURE 90 tablet 4    Vitamin D (CHOLECALCIFEROL) 1000 UNITS CAPS capsule Take 1,000 Units by mouth daily       No current facility-administered medications for this visit. BP (!) 155/83   Pulse 75   Ht 5' 9.5\" (1.765 m)   Wt 233 lb (105.7 kg)   BMI 33.91 kg/m²     Constitutional - well developed, well nourished. Eyes - conjunctiva normal.    Ear, nose, throat - No scars, masses, or lesions over external nose or ears, no atrophy of tongue  Neck-symmetric, no masses noted, no jugular vein distension  Respiration- chest wall appears symmetric, good expansion,   normal effort without use of accessory muscles  Musculoskeletal - no significant wasting of muscles noted, no bony deformities  Extremities-no clubbing, cyanosis or edema  Skin - warm, dry, and intact. No rash, erythema, or pallor.   Psychiatric - mood, affect, and behavior appear normal.      Neurological exam  Awake, alert, fluent oriented  appropriate affect  Attention and concentration appear appropriate  Recent and remote memory appears unremarkable  Speech normal without dysarthria  No clear issues with language of fund of knowledge    Cranial Nerve Exam     CN III, IV,VI-EOMI, No nystagmus, conjugate eye movements, no ptosis    CN VII-no facial assymetry    Motor Exam  antigravity throughout upper and lower extremities bilaterally, no cogwheeling, normal tone          Gait  Normal base and speed  No ataxia    Coordination  Finger to nose-unremarkable    Lab Results   Component Value Date    [id number] 534 03/21/2018     Lab Results   Component Value Date    WBC 5.8 09/11/2020    HGB 14.4 09/11/2020    HCT 45.0 09/11/2020    MCV 97.8 09/11/2020     09/11/2020     Lab Results   Component Value Date     09/11/2020    K 4.3 09/11/2020     09/11/2020    CO2 22 09/11/2020    BUN 20 09/11/2020    CREATININE 0.9 09/11/2020    GLUCOSE 105 09/11/2020    CALCIUM 9.5 09/11/2020    PROT 8.0 09/11/2020    LABALBU 4.4 09/11/2020    BILITOT 0.6 09/11/2020    ALKPHOS 76 09/11/2020    AST 17 09/11/2020    ALT 13 09/11/2020    LABGLOM >60 09/11/2020    GFRAA >59 09/11/2020    GLOB 3.1 03/30/2017       Impression: The findings of this study are 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           ___________________________  Alda Gotti M.D. Board certified in Neurology  Board certified in Clinical Neurophysiology  Fellowship trained in [address]    1. Neuropathy  G62.9 traMADol (ULTRAM) 50 MG tablet   2. Burning feet syndrome  E53.9    3. Pain, neck  M54.2    4. Other headache syndrome  G44.89        Her neurological examination today was unremarkable except for some reduced reflexes in the extremities. Based upon her history and examination I am suspicious for a peripheral neuropathy. Her EMG with nerve conduction study of the legs was suggestive of a sensory neuropathy. Her lab work was unremarkable. She will be continued on tramadol as this provides benefit. Terence Sal She will be tried on Elavil for sleep and neuropathy. The patient indicates understanding of the management plan.  She is to follow-up with me in approximately 6 month and call with any further problems. continue current care      Plan    No orders of the defined types were placed in this encounter. Orders Placed This Encounter   Medications    traMADol (ULTRAM) 50 MG tablet     Sig: Take 1 tablet by mouth every evening for 30 days. Dispense:  30 tablet     Refill:  5     Reduce doses taken as pain becomes manageable    amitriptyline (ELAVIL) 50 MG tablet     Sig: Take 1 tablet by mouth nightly     Dispense:  30 tablet     Refill:  5       No follow-ups on file. EMR Dragon/transcription disclaimer:Significant part of this  encounter note is electronic transcription/translation of spoken language to printed text. The electronic translation of spoken language may be erroneous, or at times, nonsensical words or phrases may be inadvertently transcribed.  Although I have reviewed the note for such errors, some may still exist.

## 2020-12-03 RX ORDER — LEVOTHYROXINE SODIUM 0.05 MG/1
TABLET ORAL
Qty: 90 TABLET | Refills: 3 | Status: SHIPPED | OUTPATIENT
Start: 2020-12-03 | End: 2021-09-26 | Stop reason: SDUPTHER

## 2021-02-03 RX ORDER — LISINOPRIL 10 MG/1
10 TABLET ORAL DAILY
Qty: 90 TABLET | Refills: 3 | Status: SHIPPED | OUTPATIENT
Start: 2021-02-03 | End: 2021-05-03 | Stop reason: SDUPTHER

## 2021-03-10 ENCOUNTER — TELEPHONE (OUTPATIENT)
Dept: NEUROLOGY | Age: 76
End: 2021-03-10

## 2021-03-10 DIAGNOSIS — G62.9 NEUROPATHY: Primary | ICD-10-CM

## 2021-03-10 RX ORDER — TRAMADOL HYDROCHLORIDE 50 MG/1
50 TABLET ORAL 2 TIMES DAILY PRN
Qty: 60 TABLET | Refills: 0 | Status: SHIPPED | OUTPATIENT
Start: 2021-03-10 | End: 2021-04-09 | Stop reason: SDUPTHER

## 2021-03-10 NOTE — TELEPHONE ENCOUNTER
Patient called reporting she fell last Friday and hurt her arm. She went to [facility] and found out she has a small crack in the lower part of her arm. She is having increased pain and has needed to take a Tramadol in the morning in addition to her prescribed dosage of 1 tablet nightly. At this rate she will run out before she can get it filled again. She would like to know if you will increase her Tramadol temporarily  so she will have enough to get through her pain from the fall.

## 2021-04-09 ENCOUNTER — PATIENT MESSAGE (OUTPATIENT)
Dept: NEUROLOGY | Age: 76
End: 2021-04-09

## 2021-04-09 DIAGNOSIS — G62.9 NEUROPATHY: ICD-10-CM

## 2021-04-09 RX ORDER — TRAMADOL HYDROCHLORIDE 50 MG/1
50 TABLET ORAL 2 TIMES DAILY PRN
Qty: 60 TABLET | Refills: 0 | OUTPATIENT
Start: 2021-04-09 | End: 2021-05-09

## 2021-04-09 NOTE — TELEPHONE ENCOUNTER
Requested Prescriptions     Pending Prescriptions Disp Refills    traMADol (ULTRAM) 50 MG tablet 60 tablet 0     Sig: Take 1 tablet by mouth 2 times daily as needed for Pain for up to 30 days.  Take lowest dose possible to manage pain       Last Office Visit: 10/20/2020  Next Office Visit: 4/20/2021  Last Medication Refill:  3/10/2021  Debra Dobbins up to date:  4/9/2021    *RX updated to reflect   4/9/2021  fill date*    *Dr. Starla Ramos patient

## 2021-04-10 DIAGNOSIS — G62.9 POLYNEUROPATHY, UNSPECIFIED: ICD-10-CM

## 2021-04-12 RX ORDER — TRAMADOL HYDROCHLORIDE 50 MG/1
50 TABLET ORAL 2 TIMES DAILY PRN
Qty: 60 TABLET | Refills: 0 | Status: SHIPPED | OUTPATIENT
Start: 2021-04-12 | End: 2021-05-12 | Stop reason: SDUPTHER

## 2021-04-12 RX ORDER — TRAMADOL HYDROCHLORIDE 50 MG/1
TABLET ORAL
Qty: 30 TABLET | Refills: 5 | Status: SHIPPED | OUTPATIENT
Start: 2021-04-12 | End: 2021-04-20

## 2021-04-12 NOTE — TELEPHONE ENCOUNTER
Requested Prescriptions     Pending Prescriptions Disp Refills    traMADol (ULTRAM) 50 MG tablet [Pharmacy Med Name: tramadol 50 mg tablet] 30 tablet 5     Sig: TAKE ONE TABLET BY MOUTH IN THE EVENING       Last Office Visit: 10/20/20  Next Office Visit: 4/20/2021  Last Medication Refill: 10/20/20 with 5 refills

## 2021-04-20 ENCOUNTER — OFFICE VISIT (OUTPATIENT)
Dept: NEUROLOGY | Age: 76
End: 2021-04-20
Payer: MEDICARE

## 2021-04-20 VITALS
HEART RATE: 73 BPM | BODY MASS INDEX: 33.36 KG/M2 | HEIGHT: 70 IN | DIASTOLIC BLOOD PRESSURE: 94 MMHG | WEIGHT: 233 LBS | SYSTOLIC BLOOD PRESSURE: 190 MMHG

## 2021-04-20 DIAGNOSIS — E53.9 BURNING FEET SYNDROME: ICD-10-CM

## 2021-04-20 DIAGNOSIS — G44.89 OTHER HEADACHE SYNDROME: ICD-10-CM

## 2021-04-20 DIAGNOSIS — G62.9 POLYNEUROPATHY, UNSPECIFIED: Primary | ICD-10-CM

## 2021-04-20 DIAGNOSIS — M54.2 PAIN, NECK: ICD-10-CM

## 2021-04-20 DIAGNOSIS — G62.9 NEUROPATHY: ICD-10-CM

## 2021-04-20 PROCEDURE — 99214 OFFICE O/P EST MOD 30 MIN: CPT | Performed by: PSYCHIATRY & NEUROLOGY

## 2021-04-20 PROCEDURE — G8417 CALC BMI ABV UP PARAM F/U: HCPCS | Performed by: PSYCHIATRY & NEUROLOGY

## 2021-04-20 PROCEDURE — 1123F ACP DISCUSS/DSCN MKR DOCD: CPT | Performed by: PSYCHIATRY & NEUROLOGY

## 2021-04-20 PROCEDURE — G8427 DOCREV CUR MEDS BY ELIG CLIN: HCPCS | Performed by: PSYCHIATRY & NEUROLOGY

## 2021-04-20 PROCEDURE — 1090F PRES/ABSN URINE INCON ASSESS: CPT | Performed by: PSYCHIATRY & NEUROLOGY

## 2021-04-20 PROCEDURE — 1036F TOBACCO NON-USER: CPT | Performed by: PSYCHIATRY & NEUROLOGY

## 2021-04-20 PROCEDURE — G8399 PT W/DXA RESULTS DOCUMENT: HCPCS | Performed by: PSYCHIATRY & NEUROLOGY

## 2021-04-20 PROCEDURE — 4040F PNEUMOC VAC/ADMIN/RCVD: CPT | Performed by: PSYCHIATRY & NEUROLOGY

## 2021-04-20 NOTE — PROGRESS NOTES
Chief Complaint   Patient presents with    Follow-up     follow up for Neuropathy, pt states she has fallen recently        Liborio Willson is a 66y.o. year old female who is seen for evaluation of Burning feet. This has been present for around 2015 or so. It is typically worse at night when she lays down. She sometimes feels that the nerve endings to her body are not normal. She does have some chronic neck pain issues and some occasional low back pain issues. She denies any weakness. She indicates her mother and diabetes and neuropathy. She denies any exposure to chemicals, toxins, or pesticides. She has tried Cymbalta or amitriptyline in the past. Tramadol does help. Working more in yard and more back pain. Stretching helps. Did not tolerate trileptal. Going to chiropractor helps neck and back. Gabapentin made her sick. Tramadol still helps only one at night. .Cut back on sugar and going to chiropractor. . Itching flared up and now better. Not taking Klonopin. Tramadol at night helps. Wakes up quite a bit at night. March 6 th , 2021 left leg gave out and fell. Cracked right arm. Needed more Tramadol at that time.     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/2018    Pain, neck 03/21/2018    Other headache syndrome 02/06/2019     Resolved Ambulatory Problems     Diagnosis Date Noted    No Resolved Ambulatory Problems     Past Medical History:   Diagnosis Date    Back pain     Hypertension     Hypothyroidism        Past Surgical History:   Procedure Laterality Date    CHOLECYSTECTOMY      COLONOSCOPY      1-09    TUBAL LIGATION         Family History   Problem Relation Age of Onset    Diabetes Mother     High Blood Genitourinary - No difficulty urinating, dysuria  Musculoskeletal - yes back pain or myalgia. Skin - no color change or rash  Neurologic - No seizures. No lateralizing weakness. Hematologic - no easy bruising or excessive bleeding. Psychiatric - no severe anxiety or nervousness. All other review of systems are negative. Current Outpatient Medications   Medication Sig Dispense Refill    traMADol (ULTRAM) 50 MG tablet Take 1 tablet by mouth 2 times daily as needed for Pain for up to 30 days. Take lowest dose possible to manage pain 60 tablet 0    lisinopril (PRINIVIL;ZESTRIL) 10 MG tablet Take 1 tablet by mouth daily 90 tablet 3    levothyroxine (SYNTHROID) 50 MCG tablet TAKE 1 TABLET DAILY FOR LOW THYROID 90 tablet 3    amitriptyline (ELAVIL) 50 MG tablet Take 1 tablet by mouth nightly 30 tablet 5    pantoprazole (PROTONIX) 40 MG tablet TAKE 1 TABLET DAILY FOR STOMACH 90 tablet 3    Vitamin D (CHOLECALCIFEROL) 1000 UNITS CAPS capsule Take 1,000 Units by mouth daily       No current facility-administered medications for this visit. BP (!) 190/94 Comment: PT DIDN'T WANT IT CHECKED AGAIN  Pulse 73   Ht 5' 9.5\" (1.765 m)   Wt 233 lb (105.7 kg)   BMI 33.91 kg/m²     Constitutional - well developed, well nourished. Eyes - conjunctiva normal.    Ear, nose, throat - No scars, masses, or lesions over external nose or ears, no atrophy of tongue  Neck-symmetric, no masses noted, no jugular vein distension  Respiration- chest wall appears symmetric, good expansion,   normal effort without use of accessory muscles  Musculoskeletal - no significant wasting of muscles noted, no bony deformities  Extremities-no clubbing, cyanosis or edema  Skin - warm, dry, and intact. No rash, erythema, or pallor.   Psychiatric - mood, affect, and behavior appear normal.      Neurological exam  Awake, alert, fluent oriented  appropriate affect  Attention and concentration appear appropriate  Recent and remote memory appears unremarkable  Speech normal without dysarthria  No clear issues with language of fund of knowledge    Cranial Nerve Exam     CN III, IV,VI-EOMI, No nystagmus, conjugate eye movements, no ptosis    CN VII-no facial assymetry    Motor Exam  antigravity throughout upper and lower extremities bilaterally, no cogwheeling, normal tone          Gait  Normal base and speed  No ataxia    Coordination  Finger to nose-unremarkable    Lab Results   Component Value Date    [id number] 534 03/21/2018     Lab Results   Component Value Date    WBC 5.8 09/11/2020    HGB 14.4 09/11/2020    HCT 45.0 09/11/2020    MCV 97.8 09/11/2020     09/11/2020     Lab Results   Component Value Date     09/11/2020    K 4.3 09/11/2020     09/11/2020    CO2 22 09/11/2020    BUN 20 09/11/2020    CREATININE 0.9 09/11/2020    GLUCOSE 105 09/11/2020    CALCIUM 9.5 09/11/2020    PROT 8.0 09/11/2020    LABALBU 4.4 09/11/2020    BILITOT 0.6 09/11/2020    ALKPHOS 76 09/11/2020    AST 17 09/11/2020    ALT 13 09/11/2020    LABGLOM >60 09/11/2020    GFRAA >59 09/11/2020    GLOB 3.1 03/30/2017       Impression: The findings of this study are 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           ___________________________  Gio Tristan M.D. Board certified in Neurology  Board certified in [place] [address] Neurophysiology  Fellowship trained in [place] [address] Neurophysiology          Assessment    ICD-10-CM    1. Polyneuropathy, unspecified  G62.9    2. Neuropathy  G62.9    3. Burning feet syndrome  E53.9    4. Pain, neck  M54.2    5. Other headache syndrome  G44.89        Her neurological examination previously was unremarkable except for some reduced reflexes in the extremities. Based upon her history and examination she most likely has a peripheral neuropathy. Her EMG with nerve conduction study of the legs was suggestive of a sensory neuropathy. Her lab work was unremarkable. She will be continued on tramadol as this provides benefit. . She

## 2021-05-12 DIAGNOSIS — G62.9 NEUROPATHY: ICD-10-CM

## 2021-05-12 RX ORDER — TRAMADOL HYDROCHLORIDE 50 MG/1
50 TABLET ORAL EVERY 8 HOURS PRN
Qty: 90 TABLET | Refills: 5 | Status: SHIPPED | OUTPATIENT
Start: 2021-05-12 | End: 2021-10-22 | Stop reason: SDUPTHER

## 2021-09-09 ASSESSMENT — LIFESTYLE VARIABLES
HOW OFTEN DO YOU HAVE A DRINK CONTAINING ALCOHOL: NEVER
HOW OFTEN DO YOU HAVE A DRINK CONTAINING ALCOHOL: 0
AUDIT TOTAL SCORE: INCOMPLETE
AUDIT-C TOTAL SCORE: INCOMPLETE

## 2021-09-09 ASSESSMENT — PATIENT HEALTH QUESTIONNAIRE - PHQ9
1. LITTLE INTEREST OR PLEASURE IN DOING THINGS: 0
SUM OF ALL RESPONSES TO PHQ QUESTIONS 1-9: 0
SUM OF ALL RESPONSES TO PHQ9 QUESTIONS 1 & 2: 0
2. FEELING DOWN, DEPRESSED OR HOPELESS: 0
SUM OF ALL RESPONSES TO PHQ QUESTIONS 1-9: 0
SUM OF ALL RESPONSES TO PHQ QUESTIONS 1-9: 0

## 2021-09-15 ENCOUNTER — OFFICE VISIT (OUTPATIENT)
Dept: PRIMARY CARE CLINIC | Age: 76
End: 2021-09-15
Payer: MEDICARE

## 2021-09-15 VITALS
OXYGEN SATURATION: 96 % | DIASTOLIC BLOOD PRESSURE: 86 MMHG | BODY MASS INDEX: 32.97 KG/M2 | WEIGHT: 230.3 LBS | RESPIRATION RATE: 16 BRPM | HEART RATE: 76 BPM | TEMPERATURE: 96.7 F | SYSTOLIC BLOOD PRESSURE: 138 MMHG | HEIGHT: 70 IN

## 2021-09-15 DIAGNOSIS — I10 ESSENTIAL HYPERTENSION: ICD-10-CM

## 2021-09-15 DIAGNOSIS — E03.9 ACQUIRED HYPOTHYROIDISM: ICD-10-CM

## 2021-09-15 DIAGNOSIS — M85.89 OSTEOPENIA OF MULTIPLE SITES: ICD-10-CM

## 2021-09-15 DIAGNOSIS — E55.9 VITAMIN D DEFICIENCY: ICD-10-CM

## 2021-09-15 DIAGNOSIS — Z12.31 ENCOUNTER FOR SCREENING MAMMOGRAM FOR BREAST CANCER: ICD-10-CM

## 2021-09-15 DIAGNOSIS — E78.00 HYPERCHOLESTEROLEMIA: ICD-10-CM

## 2021-09-15 DIAGNOSIS — Z00.00 ROUTINE GENERAL MEDICAL EXAMINATION AT A HEALTH CARE FACILITY: Primary | ICD-10-CM

## 2021-09-15 DIAGNOSIS — Z12.11 SCREEN FOR COLON CANCER: ICD-10-CM

## 2021-09-15 LAB
ALBUMIN SERPL-MCNC: 4.6 G/DL (ref 3.5–5.2)
ALP BLD-CCNC: 83 U/L (ref 35–104)
ALT SERPL-CCNC: 10 U/L (ref 5–33)
ANION GAP SERPL CALCULATED.3IONS-SCNC: 11 MMOL/L (ref 7–19)
AST SERPL-CCNC: 15 U/L (ref 5–32)
BILIRUB SERPL-MCNC: 0.7 MG/DL (ref 0.2–1.2)
BUN BLDV-MCNC: 24 MG/DL (ref 8–23)
CALCIUM SERPL-MCNC: 9.9 MG/DL (ref 8.8–10.2)
CHLORIDE BLD-SCNC: 104 MMOL/L (ref 98–111)
CHOLESTEROL, TOTAL: 214 MG/DL (ref 160–199)
CO2: 27 MMOL/L (ref 22–29)
CREAT SERPL-MCNC: 0.9 MG/DL (ref 0.5–0.9)
GFR AFRICAN AMERICAN: >59
GFR NON-AFRICAN AMERICAN: >60
GLUCOSE BLD-MCNC: 98 MG/DL (ref 74–109)
HCT VFR BLD CALC: 43.9 % (ref 37–47)
HDLC SERPL-MCNC: 70 MG/DL (ref 65–121)
HEMOGLOBIN: 14.1 G/DL (ref 12–16)
LDL CHOLESTEROL CALCULATED: 128 MG/DL
MCH RBC QN AUTO: 32.4 PG (ref 27–31)
MCHC RBC AUTO-ENTMCNC: 32.1 G/DL (ref 33–37)
MCV RBC AUTO: 100.9 FL (ref 81–99)
PDW BLD-RTO: 12.8 % (ref 11.5–14.5)
PLATELET # BLD: 199 K/UL (ref 130–400)
PMV BLD AUTO: 11 FL (ref 9.4–12.3)
POTASSIUM SERPL-SCNC: 4.7 MMOL/L (ref 3.5–5)
RBC # BLD: 4.35 M/UL (ref 4.2–5.4)
SODIUM BLD-SCNC: 142 MMOL/L (ref 136–145)
T4 FREE: 1.6 NG/DL (ref 0.93–1.7)
TOTAL PROTEIN: 7.8 G/DL (ref 6.6–8.7)
TRIGL SERPL-MCNC: 81 MG/DL (ref 0–149)
TSH SERPL DL<=0.05 MIU/L-ACNC: 1 UIU/ML (ref 0.27–4.2)
VITAMIN D 25-HYDROXY: 41.2 NG/ML
WBC # BLD: 5.3 K/UL (ref 4.8–10.8)

## 2021-09-15 PROCEDURE — 4040F PNEUMOC VAC/ADMIN/RCVD: CPT | Performed by: FAMILY MEDICINE

## 2021-09-15 PROCEDURE — G8417 CALC BMI ABV UP PARAM F/U: HCPCS | Performed by: FAMILY MEDICINE

## 2021-09-15 PROCEDURE — G0439 PPPS, SUBSEQ VISIT: HCPCS | Performed by: FAMILY MEDICINE

## 2021-09-15 PROCEDURE — 1123F ACP DISCUSS/DSCN MKR DOCD: CPT | Performed by: FAMILY MEDICINE

## 2021-09-15 PROCEDURE — G8399 PT W/DXA RESULTS DOCUMENT: HCPCS | Performed by: FAMILY MEDICINE

## 2021-09-15 PROCEDURE — G8427 DOCREV CUR MEDS BY ELIG CLIN: HCPCS | Performed by: FAMILY MEDICINE

## 2021-09-15 PROCEDURE — 1036F TOBACCO NON-USER: CPT | Performed by: FAMILY MEDICINE

## 2021-09-15 PROCEDURE — 1090F PRES/ABSN URINE INCON ASSESS: CPT | Performed by: FAMILY MEDICINE

## 2021-09-15 PROCEDURE — 99214 OFFICE O/P EST MOD 30 MIN: CPT | Performed by: FAMILY MEDICINE

## 2021-09-15 RX ORDER — TRAMADOL HYDROCHLORIDE 50 MG/1
50 TABLET ORAL EVERY 6 HOURS PRN
COMMUNITY

## 2021-09-15 NOTE — PATIENT INSTRUCTIONS
Personalized Preventive Plan for Cornelius Dawson - 9/15/2021  Medicare offers a range of preventive health benefits. Some of the tests and screenings are paid in full while other may be subject to a deductible, co-insurance, and/or copay. Some of these benefits include a comprehensive review of your medical history including lifestyle, illnesses that may run in your family, and various assessments and screenings as appropriate. After reviewing your medical record and screening and assessments performed today your provider may have ordered immunizations, labs, imaging, and/or referrals for you. A list of these orders (if applicable) as well as your Preventive Care list are included within your After Visit Summary for your review. Other Preventive Recommendations:    · A preventive eye exam performed by an eye specialist is recommended every 1-2 years to screen for glaucoma; cataracts, macular degeneration, and other eye disorders. · A preventive dental visit is recommended every 6 months. · Try to get at least 150 minutes of exercise per week or 10,000 steps per day on a pedometer . · Order or download the FREE \"Exercise & Physical Activity: Your Everyday Guide\" from [employer]. Call 7-740.198.4535 or search [employer] online. · You need 1897-1936 mg of calcium and 6500-8109 IU of vitamin D per day. It is possible to meet your calcium requirement with diet alone, but a vitamin D supplement is usually necessary to meet this goal.  · When exposed to the sun, use a sunscreen that protects against both UVA and UVB radiation with an SPF of 30 or greater. Reapply every 2 to 3 hours or after sweating, drying off with a towel, or swimming. · Always wear a seat belt when traveling in a car. Always wear a helmet when riding a bicycle or motorcycle. Heart-Healthy Diet   Sodium, Fat, and Cholesterol Controlled Diet       What Is a Heart Healthy Diet?    A heart-healthy diet is one that limits sodium , certain types of fat , and cholesterol . This type of diet is recommended for:   People with any form of cardiovascular disease (eg, coronary heart disease , peripheral vascular disease , previous heart attack , previous stroke )   People with risk factors for cardiovascular disease, such as high blood pressure , high cholesterol , or diabetes   Anyone who wants to lower their risk of developing cardiovascular disease   Sodium    Sodium is a mineral found in many foods. In general, most people consume much more sodium than they need. Diets high in sodium can increase blood pressure and lead to edema (water retention). On a heart-healthy diet, you should consume no more than 2,300 mg (milligrams) of sodium per dayabout the amount in one teaspoon of table salt. The foods highest in sodium include table salt (about 50% sodium), processed foods, convenience foods, and preserved foods. Cholesterol    Cholesterol is a fat-like, waxy substance in your blood. Our bodies make some cholesterol. It is also found in animal products, with the highest amounts in fatty meat, egg yolks, whole milk, cheese, shellfish, and organ meats. On a heart-healthy diet, you should limit your cholesterol intake to less than 200 mg per day. It is normal and important to have some cholesterol in your bloodstream. But too much cholesterol can cause plaque to build up within your arteries, which can eventually lead to a heart attack or stroke. The two types of cholesterol that are most commonly referred to are:   Low-density lipoprotein (LDL) cholesterol  Also known as bad cholesterol, this is the cholesterol that tends to build up along your arteries. Bad cholesterol levels are increased by eating fats that are saturated or hydrogenated. Optimal level of this cholesterol is less than 100. Over 130 starts to get risky for heart disease.    High-density lipoprotein (HDL) cholesterol  Also known as good cholesterol, this type of cholesterol actually carries cholesterol away from your arteries and may, therefore, help lower your risk of having a heart attack. You want this level to be high (ideally greater than 60). It is a risk to have a level less than 40. You can raise this good cholesterol by eating olive oil, canola oil, avocados, or nuts. Exercise raises this level, too. Fat    Fat is calorie dense and packs a lot of calories into a small amount of food. Even though fats should be limited due to their high calorie content, not all fats are bad. In fact, some fats are quite healthful. Fat can be broken down into four main types. The good-for-you fats are:   Monounsaturated fat  found in oils such as olive and canola, avocados, and nuts and natural nut butters; can decrease cholesterol levels, while keeping levels of HDL cholesterol high   Polyunsaturated fat  found in oils such as safflower, sunflower, soybean, corn, and sesame; can decrease total cholesterol and LDL cholesterol   Omega-3 fatty acids  particularly those found in fatty fish (such as salmon, trout, tuna, mackerel, herring, and sardines); can decrease risk of arrhythmias, decrease triglyceride levels, and slightly lower blood pressure   The fats that you want to limit are:   Saturated fat  found in animal products, many fast foods, and a few vegetables; increases total blood cholesterol, including LDL levels   Animal fats that are saturated include: butter, lard, whole-milk dairy products, meat fat, and poultry skin   Vegetable fats that are saturated include: hydrogenated shortening, palm oil, coconut oil, cocoa butter   Hydrogenated or trans fat  found in margarine and vegetable shortening, most shelf stable snack foods, and fried foods; increases LDL and decreases HDL     It is generally recommended that you limit your total fat for the day to less than 30% of your total calories.  If you follow an 1800-calorie heart healthy diet, for example, this would mean 60 grams of fat or less per day. Saturated fat and trans fat in your diet raises your blood cholesterol the most, much more than dietary cholesterol does. For this reason, on a heart-healthy diet, less than 7% of your calories should come from saturated fat and ideally 0% from trans fat. On an 1800-calorie diet, this translates into less than 14 grams of saturated fat per day, leaving 46 grams of fat to come from mono- and polyunsaturated fats.    Food Choices on a Heart Healthy Diet   Food Category   Foods Recommended   Foods to Avoid   Grains   Breads and rolls without salted tops Most dry and cooked cereals Unsalted crackers and breadsticks Low-sodium or homemade breadcrumbs or stuffing All rice and pastas   Breads, rolls, and crackers with salted tops High-fat baked goods (eg, muffins, donuts, pastries) Quick breads, self-rising flour, and biscuit mixes Regular bread crumbs Instant hot cereals Commercially prepared rice, pasta, or stuffing mixes   Vegetables   Most fresh, frozen, and low-sodium canned vegetables Low-sodium and salt-free vegetable juices Canned vegetables if unsalted or rinsed   Regular canned vegetables and juices, including sauerkraut and pickled vegetables Frozen vegetables with sauces Commercially prepared potato and vegetable mixes   Fruits   Most fresh, frozen, and canned fruits All fruit juices   Fruits processed with salt or sodium   Milk   Nonfat or low-fat (1%) milk Nonfat or low-fat yogurt Cottage cheese, low-fat ricotta, cheeses labeled as low-fat and low-sodium   Whole milk Reduced-fat (2%) milk Malted and chocolate milk Full fat yogurt Most cheeses (unless low-fat and low salt) Buttermilk (no more than 1 cup per week)   Meats and Beans   Lean cuts of fresh or frozen beef, veal, lamb, or pork (look for the word loin) Fresh or frozen poultry without the skin Fresh or frozen fish and some shellfish Egg whites and egg substitutes (Limit whole eggs to three per week) Tofu Nuts or seeds (unsalted, dry-roasted), low-sodium peanut butter Dried peas, beans, and lentils   Any smoked, cured, salted, or canned meat, fish, or poultry (including rain, chipped beef, cold cuts, hot dogs, sausages, sardines, and anchovies) Poultry skins Breaded and/or fried fish or meats Canned peas, beans, and lentils Salted nuts   Fats and Oils   Olive oil and canola oil Low-sodium, low-fat salad dressings and mayonnaise   Butter, margarine, coconut and palm oils, rain fat   Snacks, Sweets, and Condiments   Low-sodium or unsalted versions of broths, soups, soy sauce, and condiments Pepper, herbs, and spices; vinegar, lemon, or lime juice Low-fat frozen desserts (yogurt, sherbet, fruit bars) Sugar, cocoa powder, honey, syrup, jam, and preserves Low-fat, trans-fat free cookies, cakes, and pies Marcus and animal crackers, fig bars, joan snaps   High-fat desserts Broth, soups, gravies, and sauces, made from instant mixes or other high-sodium ingredients Salted snack foods Canned olives Meat tenderizers, seasoning salt, and most flavored vinegars   Beverages   Low-sodium carbonated beverages Tea and coffee in moderation Soy milk   Commercially softened water   Suggestions   Make whole grains, fruits, and vegetables the base of your diet. Choose heart-healthy fats such as canola, olive, and flaxseed oil, and foods high in heart-healthy fats, such as nuts, seeds, soybeans, tofu, and fish. Eat fish at least twice per week; the fish highest in omega-3 fatty acids and lowest in mercury include salmon, herring, mackerel, sardines, and canned chunk light tuna. If you eat fish less than twice per week or have high triglycerides, talk to your doctor about taking fish oil supplements. Read food labels.    For products low in fat and cholesterol, look for fat free, low-fat, cholesterol free, saturated fat free, and trans fat freeAlso scan the Nutrition Facts Label, which lists saturated fat, trans fat, that are right for you. Speak to your doctor or pharmacist about the possible side effects of your medicines. A number of medicines can cause dizziness. If you have problems with sleep, talk to your doctor. Limit your intake of alcohol. If necessary, use a cane or walker to help maintain your balance. Wear supportive, rubber-soled shoes, even at home. If you live in a region that gets wintry weather, you may want to put special cleats on your shoes to prevent you from slipping on the snow and ice. Exercise regularly to help maintain muscle tone, agility, and balance. Always hold the banister when going up or down stairs. Also, use  bars when getting in or out of the bath or shower, or using the toilet. To avoid dizziness, get up slowly from a lying down position. Sit up first, dangling your legs for a minute or two before rising to a standing position. Overall Home Safety Check   According to the Consumer Product Safety Commision's \"Older Consumer Home Safety Checklist,\" it is important to check for potential hazards in each room. And remember, proper lighting is an essential factor in home safety. If you cannot see clearly, you are more likely to fall. Important questions to ask yourself include:   Are lamp, electric, extension, and telephone cords placed out of the flow of traffic and maintained in good condition? Have frayed cords been replaced? Are all small rugs and runners slip resistant? If not, you can secure them to the floor with a special double-sided carpet tape. Are smoke detectors properly locatedone on every floor of your home and one outside of every sleeping area? Are they in good working order? Are batteries replaced at least once a year? Do you have a well-maintained carbon monoxide detector outside every sleeping are in your home? Does your furniture layout leave plenty of space to maneuver between and around chairs, tables, beds, and sofas?    Are hallways, stairs and passages between rooms well lit? Can you reach a lamp without getting out of bed? Are floor surfaces well maintained? Shag rugs, high-pile carpeting, tile floors, and polished wood floors can be particularly slippery. Stairs should always have handrails and be carpeted or fitted with a non-skid tread. Is your telephone easily reachable. Is the cord safely tucked away? Room by Room   According to the Association of Aging, bathrooms and sharri are the two most potentially hazardous rooms in your home. In the Kitchen    Be sure your stove is in proper working order and always make sure burners and the oven are off before you go out or go to sleep. Keep pots on the back burners, turn handles away from the front of the stove, and keep stove clean and free of grease build-up. Kitchen ventilation systems and range exhausts should be working properly. Keep flammable objects such as towels and pot holders away from the cooking area except when in use. Make sure kitchen curtains are tied back. Move cords and appliances away from the sink and hot surfaces. If extension cords are needed, install wiring guides so they do not hang over the sink, range, or working areas. Look for coffee pots, kettles and toaster ovens with automatic shut-offs. Keep a mop handy in the kitchen so you can wipe up spills instantly. You should also have a small fire extinguisher. Arrange your kitchen with frequently used items on lower shelves to avoid the need to stand on a stepstool to reach them. Make sure countertops are well-lit to avoid injuries while cutting and preparing food. In the Bathroom    Use a non-slip mat or decals in the tub and shower, since wet, soapy tile or porcelain surfaces are extremely slippery. Make sure bathroom rugs are non-skid or tape them firmly to the floor. Bathtubs should have at least one, preferably two, grab bars, firmly attached to structural supports in the wall.  (Do not use built-in soap holders or glass shower doors as grab bars.)    Tub seats fitted with non-slip material on the legs allow you to wash sitting down. For people with limited mobility, bathtub transfer benches allow you to slide safely into the tub. Raised toilet seats and toilet safety rails are helpful for those with knee or hip problems. In the [facility]    Make sure you use a nightlight and that the area around your bed is clear of potential obstacles. Be careful with electric blankets and never go to sleep with a heating pad, which can cause serious burns even if on a low setting. Use fire-resistant mattress covers and pillows, and NEVER smoke in bed. Keep a phone next to the bed that is programmed to dial 911 at the push of a button. If you have a chronic condition, you may want to sign on with an automatic call-in service. Typically the system includes a small pendant that connects directly to an emergency medical voice-response system. You should also make arrangements to stay in contact with someonefriend, neighbor, family memberon a regular schedule. Fire Prevention   According to the [employer] (Smoke Alarms for Every) [address] [place] [address], senior citizens are one of the two highest risk groups for death and serious injuries due to residential fires. When cooking, wear short-sleeved items, never a bulky long-sleeved robe. The [facility]'s Safety Checklist for Older Consumers emphasizes the importance of checking basements, garages, workshops and storage areas for fire hazards, such as volatile liquids, piles of old rags or clothing and overloaded circuits. Never smoke in bed or when lying down on a couch or recliner chair. Small portable electric or kerosene heaters are responsible for many home fires and should be used cautiously if at all. If you do use one, be sure to keep them away from flammable materials.     In case of fire, make sure you have a pre-established emergency exit plan. Have a professional check your fireplace and other fuel-burning appliances yearly. [employer]   Baby boomers entering the mcmahan years will continue to see the development of new products to help older adults live safely and independently in spite of age-related changes. Making Life More Livable  , by [employer], lists over 1,000 products for \"living well in the mature years,\" such as bathing and mobility aids, household security devices, ergonomically designed knives and peelers, and faucet valves and knobs for temperature control. Medical supply stores and organizations are good sources of information about products that improve your quality of life and insure your safety. Last Reviewed: November 2009 Leanna Stoner MD   Updated: 3/7/2011     ·        Learning About Living [place]  What is a living will? A living will, also called a declaration, is a legal form. It tells your family and your doctor your wishes when you can't speak for yourself. It's used by the health professionals who will treat you as you near the end of your life or if you get seriously hurt or ill. If you put your wishes in writing, your loved ones and others will know what kind of care you want. They won't need to guess. This can ease your mind and be helpful to others. And you can change or cancel your living will at any time. A living will is not the same as an estate or property will. An estate will explains what you want to happen with your money and property after you die. How do you use it? A living will is used to describe the kinds of treatment or life support you want as you near the end of your life or if you get seriously hurt or ill. Keep these facts in mind about living odell. Your living will is used only if you can't speak or make decisions for yourself. Most often, one or more doctors must certify that you can't speak or decide for yourself before your living will takes effect.   If you get better and can speak for yourself again, you can accept or refuse any treatment. It doesn't matter what you said in your living will. Some states may limit your right to refuse treatment in certain cases. For example, you may need to clearly state in your living will that you don't want artificial hydration and nutrition, such as being fed through a tube. Is a living will a legal document? A living will is a legal document. Each state has its own laws about living odell. And a living will may be called something else in your state. Here are some things to know about living odell. You don't need an  to complete a living will. But legal advice can be helpful if your state's laws are unclear. It can also help if your health history is complicated or your family can't agree on what should be in your living will. You can change your living will at any time. Some people find that their wishes about end-of-life care change as their health changes. If you make big changes to your living will, complete a new form. If you move to another state, make sure that your living will is legal in the state where you now live. In most cases, doctors will respect your wishes even if you have a form from a different state. You might use a universal form that has been approved by many states. This kind of form can sometimes be filled out and stored online. Your digital copy will then be available wherever you have a connection to the internet. The doctors and nurses who need to treat you can find it right away. Your state may offer an online registry. This is another place where you can store your living will online. It's a good idea to get your living will notarized. This means using a person called a  to watch two people sign, or witness, your living will. What should you know when you create a living will?   Here are some questions to ask yourself as you make your living will:  Do you know enough about life support methods that might be used? If not, talk to your doctor so you know what might be done if you can't breathe on your own, your heart stops, or you can't swallow. What things would you still want to be able to do after you receive life-support methods? Would you want to be able to walk? To speak? To eat on your own? To live without the help of machines? Do you want certain Sabianist practices performed if you become very ill? If you have a choice, where do you want to be cared for? In your home? At a hospital or nursing home? If you have a choice at the end of your life, where would you prefer to die? At home? In a hospital or nursing home? Somewhere else? Would you prefer to be buried or cremated? Do you want your organs to be donated after you die? What should you do with your living will? Make sure that your family members and your health care agent have copies of your living will (also called a declaration). Give your doctor a copy of your living will. Ask him or her to keep it as part of your medical record. If you have more than one doctor, make sure that each one has a copy. Put a copy of your living will where it can be easily found. For example, some people may put a copy on their refrigerator door. If you are using a digital copy, be sure your doctor, family members, and health care agent know how to find and access it. Where can you learn more? Go to [email or web address] and sign in to your [employer] account. Enter U805 in the Per Vices box to learn more about \"Learning About Living Jean. \"     If you do not have an account, please click on the \"Sign Up Now\" link. Current as of: March 17, 2021               Content Version: 12.9  © 7361-0009 [employer]. Care instructions adapted under license by [facility].  If you have questions about a medical condition or this instruction, always ask your healthcare professional. "[employer] disclaims any warranty or liability for your use of this information.     ·

## 2021-09-15 NOTE — PROGRESS NOTES
symptoms. ASSESSMENT:   1. Routine general medical examination at a health care facility    2. Encounter for screening mammogram for breast cancer    3. Screen for colon cancer    4. Vitamin D deficiency    5. Osteopenia of multiple sites    6. Essential hypertension    7. Acquired hypothyroidism    8. Hypercholesterolemia        PLAN:   Lifestyle advice: discussed diet and exercise  MEDICATIONS:  Orders Placed This Encounter   Medications    levothyroxine (SYNTHROID) 50 MCG tablet     Sig: TAKE 1 TABLET DAILY FOR LOW THYROID     Dispense:  90 tablet     Refill:  3     Since blood pressure is controlled she will continue her lisinopril 10 mg 1 tablet once a day. Levothyroxine 50 mcg is controlling her hypothyroidism. Controlled Substance Monitoring:    Acute and Chronic Pain Monitoring:   No flowsheet data found. She is not abusing her Ultram.      ORDERS:  Orders Placed This Encounter   Procedures    Cologuard    [facility]MU DIGITAL SCREEN W OR WO CAD BILATERAL    CBC    Comprehensive Metabolic Panel    Lipid Panel    Vitamin D 25 Hydroxy    TSH without Reflex    T4, Free     We will contact her when we get results of her blood work. We did discuss diet and exercise. Mammogram was scheduled. She is low risk and would like to do the Cologuard instead of doing a colonoscopy. If she has any problems or develops symptoms like bright red blood per rectum or dark tarry stools she is to let me know. We are checking the CBC and CMP because of her hypertension. Lipid profile is being done because of her hypercholesterolemia. We are checking her vitamin D because of her osteoporosis and vitamin D deficiency. Follow-up:  Return in 1 year (on 9/15/2022) for Medicare Annual Wellness Visit in 1 year. PATIENT INSTRUCTIONS:  Patient Instructions     Personalized Preventive Plan for Philip Hernandez - 9/15/2021  Medicare offers a range of preventive health benefits.  Some of the tests and screenings are paid in full while other may be subject to a deductible, co-insurance, and/or copay. Some of these benefits include a comprehensive review of your medical history including lifestyle, illnesses that may run in your family, and various assessments and screenings as appropriate. After reviewing your medical record and screening and assessments performed today your provider may have ordered immunizations, labs, imaging, and/or referrals for you. A list of these orders (if applicable) as well as your Preventive Care list are included within your After Visit Summary for your review. Other Preventive Recommendations:    · A preventive eye exam performed by an eye specialist is recommended every 1-2 years to screen for glaucoma; cataracts, macular degeneration, and other eye disorders. · A preventive dental visit is recommended every 6 months. · Try to get at least 150 minutes of exercise per week or 10,000 steps per day on a pedometer . · Order or download the FREE \"Exercise & Physical Activity: Your Everyday Guide\" from [employer]. Call 2-286.914.7612 or search [employer] online. · You need 0755-3973 mg of calcium and 1255-7979 IU of vitamin D per day. It is possible to meet your calcium requirement with diet alone, but a vitamin D supplement is usually necessary to meet this goal.  · When exposed to the sun, use a sunscreen that protects against both UVA and UVB radiation with an SPF of 30 or greater. Reapply every 2 to 3 hours or after sweating, drying off with a towel, or swimming. · Always wear a seat belt when traveling in a car. Always wear a helmet when riding a bicycle or motorcycle. Heart-Healthy Diet   Sodium, Fat, and Cholesterol Controlled Diet       What Is a Heart Healthy Diet? A heart-healthy diet is one that limits sodium , certain types of fat , and cholesterol .  This type of diet is recommended for:   People with any form of cardiovascular disease (eg, coronary heart disease , peripheral vascular disease , previous heart attack , previous stroke )   People with risk factors for cardiovascular disease, such as high blood pressure , high cholesterol , or diabetes   Anyone who wants to lower their risk of developing cardiovascular disease   Sodium    Sodium is a mineral found in many foods. In general, most people consume much more sodium than they need. Diets high in sodium can increase blood pressure and lead to edema (water retention). On a heart-healthy diet, you should consume no more than 2,300 mg (milligrams) of sodium per dayabout the amount in one teaspoon of table salt. The foods highest in sodium include table salt (about 50% sodium), processed foods, convenience foods, and preserved foods. Cholesterol    Cholesterol is a fat-like, waxy substance in your blood. Our bodies make some cholesterol. It is also found in animal products, with the highest amounts in fatty meat, egg yolks, whole milk, cheese, shellfish, and organ meats. On a heart-healthy diet, you should limit your cholesterol intake to less than 200 mg per day. It is normal and important to have some cholesterol in your bloodstream. But too much cholesterol can cause plaque to build up within your arteries, which can eventually lead to a heart attack or stroke. The two types of cholesterol that are most commonly referred to are:   Low-density lipoprotein (LDL) cholesterol  Also known as bad cholesterol, this is the cholesterol that tends to build up along your arteries. Bad cholesterol levels are increased by eating fats that are saturated or hydrogenated. Optimal level of this cholesterol is less than 100. Over 130 starts to get risky for heart disease. High-density lipoprotein (HDL) cholesterol  Also known as good cholesterol, this type of cholesterol actually carries cholesterol away from your arteries and may, therefore, help lower your risk of having a heart attack.  You want this level to be high (ideally greater than 60). It is a risk to have a level less than 40. You can raise this good cholesterol by eating olive oil, canola oil, avocados, or nuts. Exercise raises this level, too. Fat    Fat is calorie dense and packs a lot of calories into a small amount of food. Even though fats should be limited due to their high calorie content, not all fats are bad. In fact, some fats are quite healthful. Fat can be broken down into four main types. The good-for-you fats are:   Monounsaturated fat  found in oils such as olive and canola, avocados, and nuts and natural nut butters; can decrease cholesterol levels, while keeping levels of HDL cholesterol high   Polyunsaturated fat  found in oils such as safflower, sunflower, soybean, corn, and sesame; can decrease total cholesterol and LDL cholesterol   Omega-3 fatty acids  particularly those found in fatty fish (such as salmon, trout, tuna, mackerel, herring, and sardines); can decrease risk of arrhythmias, decrease triglyceride levels, and slightly lower blood pressure   The fats that you want to limit are:   Saturated fat  found in animal products, many fast foods, and a few vegetables; increases total blood cholesterol, including LDL levels   Animal fats that are saturated include: butter, lard, whole-milk dairy products, meat fat, and poultry skin   Vegetable fats that are saturated include: hydrogenated shortening, palm oil, coconut oil, cocoa butter   Hydrogenated or trans fat  found in margarine and vegetable shortening, most shelf stable snack foods, and fried foods; increases LDL and decreases HDL     It is generally recommended that you limit your total fat for the day to less than 30% of your total calories. If you follow an 1800-calorie heart healthy diet, for example, this would mean 60 grams of fat or less per day.    Saturated fat and trans fat in your diet raises your blood cholesterol the most, much more than dietary poultry (including rain, chipped beef, cold cuts, hot dogs, sausages, sardines, and anchovies) Poultry skins Breaded and/or fried fish or meats Canned peas, beans, and lentils Salted nuts   Fats and Oils   Olive oil and canola oil Low-sodium, low-fat salad dressings and mayonnaise   Butter, margarine, coconut and palm oils, rain fat   Snacks, Sweets, and Condiments   Low-sodium or unsalted versions of broths, soups, soy sauce, and condiments Pepper, herbs, and spices; vinegar, lemon, or lime juice Low-fat frozen desserts (yogurt, sherbet, fruit bars) Sugar, cocoa powder, honey, syrup, jam, and preserves Low-fat, trans-fat free cookies, cakes, and pies Marcus and animal crackers, fig bars, joan snaps   High-fat desserts Broth, soups, gravies, and sauces, made from instant mixes or other high-sodium ingredients Salted snack foods Canned olives Meat tenderizers, seasoning salt, and most flavored vinegars   Beverages   Low-sodium carbonated beverages Tea and coffee in moderation Soy milk   Commercially softened water   Suggestions   Make whole grains, fruits, and vegetables the base of your diet. Choose heart-healthy fats such as canola, olive, and flaxseed oil, and foods high in heart-healthy fats, such as nuts, seeds, soybeans, tofu, and fish. Eat fish at least twice per week; the fish highest in omega-3 fatty acids and lowest in mercury include salmon, herring, mackerel, sardines, and canned chunk light tuna. If you eat fish less than twice per week or have high triglycerides, talk to your doctor about taking fish oil supplements. Read food labels. For products low in fat and cholesterol, look for fat free, low-fat, cholesterol free, saturated fat free, and trans fat freeAlso scan the Nutrition Facts Label, which lists saturated fat, trans fat, and cholesterol amounts.    For products low in sodium, look for sodium free, very low sodium, low sodium, no added salt, and unsalted   Skip the salt when cooking or at the table; if food needs more flavor, get creative and try out different herbs and spices. Garlic and onion also add substantial flavor to foods. Trim any visible fat off meat and poultry before cooking, and drain the fat off after pradhan. Use cooking methods that require little or no added fat, such as grilling, boiling, baking, poaching, broiling, roasting, steaming, stir-frying, and sauting. Avoid fast food and convenience food. They tend to be high in saturated and trans fat and have a lot of added salt. Talk to a registered dietitian for individualized diet advice. Last Reviewed: March 2011 Asia Lewis MS, MPH, RD   Updated: 3/29/2011   ·     Keeping Home a [facility]       As we get older, changes in balance, gait, strength, vision, hearing, and cognition make even the most youthful senior more prone to accidents. Falls are one of the leading health risks for older people. This increased risk of falling is related to:   Aging process (eg, decreased muscle strength, slowed reflexes)   Higher incidence of chronic health problems (eg, arthritis, diabetes) that may limit mobility, agility or sensory awareness   Side effects of medicine (eg, dizziness, blurred vision)especially medicines like prescription pain medicines and drugs used to treat mental health conditions   Depending on the brittleness of your bones, the consequences of a fall can be serious and long lasting. Home Life   Research by the Association of Aging [facility]) shows that some home accidents among older adults can be prevented by making simple lifestyle changes and basic modifications and repairs to the home environment. Here are some lifestyle changes that experts recommend:   Have your hearing and vision checked regularly. Be sure to wear prescription glasses that are right for you. Speak to your doctor or pharmacist about the possible side effects of your medicines. A number of medicines can cause dizziness. If you have problems with sleep, talk to your doctor. Limit your intake of alcohol. If necessary, use a cane or walker to help maintain your balance. Wear supportive, rubber-soled shoes, even at home. If you live in a region that gets wintry weather, you may want to put special cleats on your shoes to prevent you from slipping on the snow and ice. Exercise regularly to help maintain muscle tone, agility, and balance. Always hold the banister when going up or down stairs. Also, use  bars when getting in or out of the bath or shower, or using the toilet. To avoid dizziness, get up slowly from a lying down position. Sit up first, dangling your legs for a minute or two before rising to a standing position. Overall Home Safety Check   According to the Consumer [employer] Safety Commision's \"Older Consumer Home Safety Checklist,\" it is important to check for potential hazards in each room. And remember, proper lighting is an essential factor in home safety. If you cannot see clearly, you are more likely to fall. Important questions to ask yourself include:   Are lamp, electric, extension, and telephone cords placed out of the flow of traffic and maintained in good condition? Have frayed cords been replaced? Are all small rugs and runners slip resistant? If not, you can secure them to the floor with a special double-sided carpet tape. Are smoke detectors properly locatedone on every floor of your home and one outside of every sleeping area? Are they in good working order? Are batteries replaced at least once a year? Do you have a well-maintained carbon monoxide detector outside every sleeping are in your home? Does your furniture layout leave plenty of space to maneuver between and around chairs, tables, beds, and sofas? Are hallways, stairs and passages between rooms well lit? Can you reach a lamp without getting out of bed? Are floor surfaces well maintained?  Shag rugs, high-pile carpeting, tile floors, and polished wood floors can be particularly slippery. Stairs should always have handrails and be carpeted or fitted with a non-skid tread. Is your telephone easily reachable. Is the cord safely tucked away? Room by Room   According to the Association of Aging, bathrooms and sharri are the two most potentially hazardous rooms in your home. In the Kitchen    Be sure your stove is in proper working order and always make sure burners and the oven are off before you go out or go to sleep. Keep pots on the back burners, turn handles away from the front of the stove, and keep stove clean and free of grease build-up. Kitchen ventilation systems and range exhausts should be working properly. Keep flammable objects such as towels and pot holders away from the cooking area except when in use. Make sure kitchen curtains are tied back. Move cords and appliances away from the sink and hot surfaces. If extension cords are needed, install wiring guides so they do not hang over the sink, range, or working areas. Look for coffee pots, kettles and toaster ovens with automatic shut-offs. Keep a mop handy in the kitchen so you can wipe up spills instantly. You should also have a small fire extinguisher. Arrange your kitchen with frequently used items on lower shelves to avoid the need to stand on a stepstool to reach them. Make sure countertops are well-lit to avoid injuries while cutting and preparing food. In the Bathroom    Use a non-slip mat or decals in the tub and shower, since wet, soapy tile or porcelain surfaces are extremely slippery. Make sure bathroom rugs are non-skid or tape them firmly to the floor. Bathtubs should have at least one, preferably two, grab bars, firmly attached to structural supports in the wall. (Do not use built-in soap holders or glass shower doors as grab bars.)    Tub seats fitted with non-slip material on the legs allow you to wash sitting down.  For people with limited mobility, bathtub transfer benches allow you to slide safely into the tub. Raised toilet seats and toilet safety rails are helpful for those with knee or hip problems. In the [facility]    Make sure you use a nightlight and that the area around your bed is clear of potential obstacles. Be careful with electric blankets and never go to sleep with a heating pad, which can cause serious burns even if on a low setting. Use fire-resistant mattress covers and pillows, and NEVER smoke in bed. Keep a phone next to the bed that is programmed to dial 911 at the push of a button. If you have a chronic condition, you may want to sign on with an automatic call-in service. Typically the system includes a small pendant that connects directly to an emergency medical voice-response system. You should also make arrangements to stay in contact with someonefriend, neighbor, family memberon a regular schedule. Fire Prevention   According to the [employer]. (Smoke Alarms for Every) [address] [place] [address], senior citizens are one of the two highest risk groups for death and serious injuries due to residential fires. When cooking, wear short-sleeved items, never a bulky long-sleeved robe. The [facility]'s Safety Checklist for Older Consumers emphasizes the importance of checking basements, garages, workshops and storage areas for fire hazards, such as volatile liquids, piles of old rags or clothing and overloaded circuits. Never smoke in bed or when lying down on a couch or recliner chair. Small portable electric or kerosene heaters are responsible for many home fires and should be used cautiously if at all. If you do use one, be sure to keep them away from flammable materials. In case of fire, make sure you have a pre-established emergency exit plan. Have a professional check your fireplace and other fuel-burning appliances yearly.     Helping Hands   Baby boomers entering the mcmahan years will continue to see the development of new products to help older adults live safely and independently in spite of age-related changes. Making Life More Livable  , by Luiza Johnson, lists over 1,000 products for \"living well in the mature years,\" such as bathing and mobility aids, household security devices, ergonomically designed knives and peelers, and faucet valves and knobs for temperature control. Medical supply stores and organizations are good sources of information about products that improve your quality of life and insure your safety. Last Reviewed: November 2009 David Cota MD   Updated: 3/7/2011     ·        Learning About Living Phyllis Howell  What is a living will? A living will, also called a declaration, is a legal form. It tells your family and your doctor your wishes when you can't speak for yourself. It's used by the health professionals who will treat you as you near the end of your life or if you get seriously hurt or ill. If you put your wishes in writing, your loved ones and others will know what kind of care you want. They won't need to guess. This can ease your mind and be helpful to others. And you can change or cancel your living will at any time. A living will is not the same as an estate or property will. An estate will explains what you want to happen with your money and property after you die. How do you use it? A living will is used to describe the kinds of treatment or life support you want as you near the end of your life or if you get seriously hurt or ill. Keep these facts in mind about living odell. Your living will is used only if you can't speak or make decisions for yourself. Most often, one or more doctors must certify that you can't speak or decide for yourself before your living will takes effect. If you get better and can speak for yourself again, you can accept or refuse any treatment. It doesn't matter what you said in your living will.   Some states may limit can't swallow. What things would you still want to be able to do after you receive life-support methods? Would you want to be able to walk? To speak? To eat on your own? To live without the help of machines? Do you want certain Voodoo practices performed if you become very ill? If you have a choice, where do you want to be cared for? In your home? At a hospital or nursing home? If you have a choice at the end of your life, where would you prefer to die? At home? In a hospital or nursing home? Somewhere else? Would you prefer to be buried or cremated? Do you want your organs to be donated after you die? What should you do with your living will? Make sure that your family members and your health care agent have copies of your living will (also called a declaration). Give your doctor a copy of your living will. Ask him or her to keep it as part of your medical record. If you have more than one doctor, make sure that each one has a copy. Put a copy of your living will where it can be easily found. For example, some people may put a copy on their refrigerator door. If you are using a digital copy, be sure your doctor, family members, and health care agent know how to find and access it. Where can you learn more? Go to https://bCommunities.Wayin. org and sign in to your [facility] account. Enter Z370 in the [facility] box to learn more about \"Learning About Living [place]. \"     If you do not have an account, please click on the \"Sign Up Now\" link. Current as of: March 17, 2021               Content Version: 12.9  © 0226-7315 Healthwise, [email or web address]. Care instructions adapted under license by [facility] ([facility]). If you have questions about a medical condition or this instruction, always ask your healthcare professional. [address]  any warranty or liability for your use of this information.     ·         EMR Dragon/transcription disclaimer:  Much of this encounter note is electronic transcription/translation of spoken language to printed texts. The electronic translation of spoken language may be erroneous, or at times, nonsensical words or phrases may be inadvertently transcribed.   Although I have reviewed the note for such errors, some may still exist.

## 2021-09-15 NOTE — PROGRESS NOTES
Medicare Annual Wellness Visit  Name: Mae Chaudhari Date: 9/15/2021   MRN: [id number] Sex: Female   Age: 64 y.o. Ethnicity: Non- / Non    : 1945 Race: White (non-)      Cyn Herr is here for Medicare AWV (pt is fasting, no new issues only complaint is her lower back pain) and Colon Cancer Screening (wantsa to discuss cologuard)    Screenings for behavioral, psychosocial and functional/safety risks, and cognitive dysfunction are all negative except as indicated below. These results, as well as other patient data from the [address] form, are documented in Flowsheets linked to this Encounter. Allergies   Allergen Reactions    Neurontin [Gabapentin] Nausea Only       Prior to Visit Medications    Medication Sig Taking? Authorizing Provider   traMADol (ULTRAM) 50 MG tablet Take 50 mg by mouth every 6 hours as needed for Pain.  Yes Historical Provider, MD   pantoprazole (PROTONIX) 40 MG tablet TAKE 1 TABLET DAILY FOR STOMACH Yes Chato Germain MD   lisinopril (PRINIVIL;ZESTRIL) 10 MG tablet 1 tablet by mouth once a day for high blood pressure Yes Chato Germain MD   levothyroxine (SYNTHROID) 50 MCG tablet TAKE 1 TABLET DAILY FOR LOW THYROID Yes Chato Germain MD   Vitamin D (CHOLECALCIFEROL) 1000 UNITS CAPS capsule Take 1,000 Units by mouth daily Yes Historical Provider, MD   amitriptyline (ELAVIL) 50 MG tablet Take 1 tablet by mouth nightly  Patient not taking: Reported on 9/15/2021  Todd Florez MD       Past Medical History:   Diagnosis Date    Back pain     GERD (gastroesophageal reflux disease)     Hypertension     Hypothyroidism     thyroid nodule    Neuropathy     Xerosis of skin        Past Surgical History:   Procedure Laterality Date    CHOLECYSTECTOMY      COLONOSCOPY          TUBAL LIGATION         Family History   Problem Relation Age of Onset    Diabetes Mother     High Blood Pressure Mother     Alzheimer's Disease Mother    [employer] Habits/Nutrition  Do you exercise for at least 20 minutes 2-3 times per week?: (!) No  Have you lost any weight without trying in the past 3 months?: No  Do you eat only one meal per day?: No  Have you seen the dentist within the past year?: (!) No  Body mass index: (!) 33.52  Health Habits/Nutrition Interventions:  · Inadequate physical activity:  We discussed increasing activity  · Dental exam overdue:  patient encouraged to make appointment with his/her dentist       Personalized Preventive Plan   Current Health Maintenance Status  Immunization History   Administered Date(s) Administered    COVID-19, J&J, PF, 0.5 mL 03/11/2021    Pneumococcal Conjugate 13-valent ([id number]) 01/04/2016    Pneumococcal Polysaccharide ([id number]) 01/30/2012    Tdap (Boostrix, Adacel) 12/12/2007        Health Maintenance   Topic Date Due    Shingles Vaccine (1 of 2) Never done    Colon cancer screen colonoscopy  01/05/2019    Annual Wellness Visit (AWV)  Never done    Flu vaccine (1) Never done    TSH testing  09/11/2021    Potassium monitoring  09/11/2021    Creatinine monitoring  09/11/2021    DTaP/Tdap/Td vaccine (2 - Td or Tdap) 09/15/2022 (Originally 12/12/2017)    DEXA (modify frequency per FRAX score)  Completed    Pneumococcal 65+ years Vaccine  Completed    COVID-19 Vaccine  Completed    Hepatitis C screen  Completed    Hepatitis A vaccine  Aged Out    Hepatitis B vaccine  Aged Out    Hib vaccine  Aged Out    Meningococcal (ACWY) vaccine  Aged Out     Recommendations for [employer] Due: see orders and patient instructions/AVS.  . Recommended screening schedule for the next 5-10 years is provided to the patient in written form: see Patient Steph Truong was seen today for medicare awv and colon cancer screening.     Diagnoses and all orders for this visit:    Encounter for screening mammogram for breast cancer  -     [facility] DIGITAL SCREEN W OR WO CAD BILATERAL

## 2021-09-26 RX ORDER — LEVOTHYROXINE SODIUM 0.05 MG/1
TABLET ORAL
Qty: 90 TABLET | Refills: 3 | Status: SHIPPED | OUTPATIENT
Start: 2021-09-26 | End: 2022-10-25

## 2021-10-22 ENCOUNTER — OFFICE VISIT (OUTPATIENT)
Dept: NEUROLOGY | Age: 76
End: 2021-10-22
Payer: MEDICARE

## 2021-10-22 VITALS
DIASTOLIC BLOOD PRESSURE: 77 MMHG | BODY MASS INDEX: 32.93 KG/M2 | SYSTOLIC BLOOD PRESSURE: 120 MMHG | WEIGHT: 230 LBS | HEIGHT: 70 IN | HEART RATE: 78 BPM

## 2021-10-22 DIAGNOSIS — G62.9 NEUROPATHY: Primary | ICD-10-CM

## 2021-10-22 DIAGNOSIS — G44.89 OTHER HEADACHE SYNDROME: ICD-10-CM

## 2021-10-22 DIAGNOSIS — M54.2 PAIN, NECK: ICD-10-CM

## 2021-10-22 DIAGNOSIS — E53.9 BURNING FEET SYNDROME: ICD-10-CM

## 2021-10-22 DIAGNOSIS — G62.9 POLYNEUROPATHY, UNSPECIFIED: ICD-10-CM

## 2021-10-22 PROCEDURE — G8417 CALC BMI ABV UP PARAM F/U: HCPCS | Performed by: PSYCHIATRY & NEUROLOGY

## 2021-10-22 PROCEDURE — 1090F PRES/ABSN URINE INCON ASSESS: CPT | Performed by: PSYCHIATRY & NEUROLOGY

## 2021-10-22 PROCEDURE — G8399 PT W/DXA RESULTS DOCUMENT: HCPCS | Performed by: PSYCHIATRY & NEUROLOGY

## 2021-10-22 PROCEDURE — G8427 DOCREV CUR MEDS BY ELIG CLIN: HCPCS | Performed by: PSYCHIATRY & NEUROLOGY

## 2021-10-22 PROCEDURE — 1036F TOBACCO NON-USER: CPT | Performed by: PSYCHIATRY & NEUROLOGY

## 2021-10-22 PROCEDURE — G8484 FLU IMMUNIZE NO ADMIN: HCPCS | Performed by: PSYCHIATRY & NEUROLOGY

## 2021-10-22 PROCEDURE — 99214 OFFICE O/P EST MOD 30 MIN: CPT | Performed by: PSYCHIATRY & NEUROLOGY

## 2021-10-22 PROCEDURE — 1123F ACP DISCUSS/DSCN MKR DOCD: CPT | Performed by: PSYCHIATRY & NEUROLOGY

## 2021-10-22 PROCEDURE — 4040F PNEUMOC VAC/ADMIN/RCVD: CPT | Performed by: PSYCHIATRY & NEUROLOGY

## 2021-10-22 RX ORDER — TRAMADOL HYDROCHLORIDE 50 MG/1
50 TABLET ORAL EVERY 8 HOURS PRN
Qty: 90 TABLET | Refills: 5 | Status: SHIPPED | OUTPATIENT
Start: 2021-10-22 | End: 2022-04-22 | Stop reason: SDUPTHER

## 2021-10-22 NOTE — PROGRESS NOTES
Chief Complaint   Patient presents with   Litzy Palmer is a 64y.o. year old female who is seen for evaluation of burning feet. This has been present for around 2015 or so. It is typically worse at night when she lays down. She sometimes feels that the nerve endings to her body are not normal. She does have some chronic neck pain issues and some occasional low back pain issues. She denies any weakness. She indicates her mother and diabetes and neuropathy. She denies any exposure to chemicals, toxins, or pesticides. She has tried Cymbalta or amitriptyline in the past. Tramadol does help. Stretching helps. Did not tolerate trileptal. Going to chiropractor helps neck and back. Gabapentin made her sick. Tramadol still helps only one at night. .Cut back on sugar and going to chiropractor. . Itching flared up and now better. Not taking Klonopin. Tramadol at night helps. Wakes up quite a bit at night. March 6 th , 2021 left leg gave out and fell. Cracked right arm. Up to 3 Tramadol now and this helps her back as well.      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/2018    Pain, neck 03/21/2018    Other headache syndrome 02/06/2019     Resolved Ambulatory Problems     Diagnosis Date Noted    No Resolved Ambulatory Problems     Past Medical History:   Diagnosis Date    Back pain     Hypertension     Hypothyroidism        Past Surgical History:   Procedure Laterality Date    CHOLECYSTECTOMY      COLONOSCOPY      1-09    TUBAL LIGATION         Family History   Problem Relation Age of Onset    Diabetes Mother     High Blood Pressure Mother     Alzheimer's Disease Mother     Alzheimer's Disease Father    Hanna Eldridge Cancer Sister         colon       Allergies   Allergen Reactions    Neurontin [Gabapentin] Nausea Only       Social History     Socioeconomic History    Marital status:      Spouse name: Not on file    Number of children: 1    Years of education: Not on file    Highest education level: Not on file   Occupational History    Occupation: retired    Tobacco Use    Smoking status: Never Smoker    Smokeless tobacco: Never Used   Vaping Use    Vaping Use: Never used   Substance and Sexual Activity    Alcohol use: No    Drug use: No    Sexual activity: Yes     Partners: Male   Other Topics Concern    Not on file   Social History Narrative    Not on file     Social Determinants of Health     Financial Resource Strain:     Difficulty of Paying Living Expenses:    Food Insecurity:     Worried About Running Out of Food in the Last Year:     [address] in the Last Year:    Transportation Needs:     Lack of Transportation (Medical):  Lack of Transportation (Non-Medical):    Physical Activity:     Days of Exercise per Week:     Minutes of Exercise per Session:    Stress:     Feeling of Stress :    Social Connections:     Frequency of Communication with Friends and Family:     Frequency of Social Gatherings with Friends and Family:     Attends [employer] Services:     Active Member of Clubs or Organizations:     Attends Club or Organization Meetings:     Marital Status:    Intimate Partner Violence:     Fear of Current or Ex-Partner:     Emotionally Abused:     Physically Abused:     Sexually Abused:      Review of Systems     Constitutional - No fever or chills. No diaphoresis or significant fatigue. HENT -  No tinnitus or significant hearing loss.   Eyes - no sudden vision change or eye pain  Respiratory - no significant shortness of breath or cough  Cardiovascular - no chest pain No palpitations or significant leg swelling  Gastrointestinal - no abdominal swelling or pain.    Genitourinary - No difficulty urinating, dysuria  Musculoskeletal - no back pain or myalgia. Skin - no color change or rash  Neurologic - No seizures. No lateralizing weakness. Hematologic - no easy bruising or excessive bleeding. Psychiatric - no severe anxiety or nervousness. All other review of systems are negative. Current Outpatient Medications   Medication Sig Dispense Refill    traMADol (ULTRAM) 50 MG tablet Take 1 tablet by mouth every 8 hours as needed for Pain for up to 30 days. Take lowest dose possible to manage pain 90 tablet 5    levothyroxine (SYNTHROID) 50 MCG tablet TAKE 1 TABLET DAILY FOR LOW THYROID 90 tablet 3    traMADol (ULTRAM) 50 MG tablet Take 50 mg by mouth every 6 hours as needed for Pain.  pantoprazole (PROTONIX) 40 MG tablet TAKE 1 TABLET DAILY FOR STOMACH 90 tablet 3    lisinopril (PRINIVIL;ZESTRIL) 10 MG tablet 1 tablet by mouth once a day for high blood pressure 90 tablet 3    Vitamin D (CHOLECALCIFEROL) 1000 UNITS CAPS capsule Take 1,000 Units by mouth daily       No current facility-administered medications for this visit. /77   Pulse 78   Ht 5' 9.5\" (1.765 m)   Wt 230 lb (104.3 kg)   BMI 33.48 kg/m²     Constitutional - well developed, well nourished. Eyes - conjunctiva normal.    Ear, nose, throat - No scars, masses, or lesions over external nose or ears, no atrophy of tongue  Neck-symmetric, no masses noted, no jugular vein distension  Respiration- chest wall appears symmetric, good expansion,   normal effort without use of accessory muscles  Musculoskeletal - no significant wasting of muscles noted, no bony deformities  Extremities-no clubbing, cyanosis or edema  Skin - warm, dry, and intact. No rash, erythema, or pallor.   Psychiatric - mood, affect, and behavior appear normal.      Neurological exam  Awake, alert, fluent oriented  appropriate affect  Attention and concentration appear appropriate  Recent and remote memory appears unremarkable  Speech normal without dysarthria  No clear issues with language of fund of knowledge    Cranial Nerve Exam     CN III, IV,VI-EOMI, No nystagmus, conjugate eye movements, no ptosis    CN VII-no facial assymetry    Motor Exam  antigravity throughout upper and lower extremities bilaterally, no cogwheeling, normal tone          Gait  Normal base and speed  No ataxia    Coordination  Finger to nose-unremarkable    Lab Results   Component Value Date    LUPXFWXQ34 534 03/21/2018     Lab Results   Component Value Date    WBC 5.3 09/15/2021    HGB 14.1 09/15/2021    HCT 43.9 09/15/2021    .9 (H) 09/15/2021     09/15/2021     Lab Results   Component Value Date     09/15/2021    K 4.7 09/15/2021     09/15/2021    CO2 27 09/15/2021    BUN 24 (H) 09/15/2021    CREATININE 0.9 09/15/2021    GLUCOSE 98 09/15/2021    CALCIUM 9.9 09/15/2021    PROT 7.8 09/15/2021    LABALBU 4.6 09/15/2021    BILITOT 0.7 09/15/2021    ALKPHOS 83 09/15/2021    AST 15 09/15/2021    ALT 10 09/15/2021    LABGLOM >60 09/15/2021    GFRAA >59 09/15/2021    GLOB 3.1 03/30/2017       Impression: The findings of this study are 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           ___________________________  Mariela Chi M.D. Board certified in Neurology  Board certified in Clinical Neurophysiology  Fellowship trained in [address]    1. Neuropathy  G62.9 traMADol (ULTRAM) 50 MG tablet   2. Polyneuropathy, unspecified  G62.9    3. Burning feet syndrome  E53.9    4. Pain, neck  M54.2    5. Other headache syndrome  G44.89        Her neurological examination previously was unremarkable except for some reduced reflexes in the extremities. Based upon her history and examination she most likely has a peripheral neuropathy. Her EMG with nerve conduction study of the legs was suggestive of a sensory neuropathy. Her lab work was unremarkable.  She will be continued on tramadol as this provides benefit. Jennifer Wellington She will increased her Tramadol toTID. She is off Elavil for sleep and neuropathy. The patient indicates understanding of the management plan. She is to follow-up with me in approximately 6 month and call with any further problems. continue current care as treatment is helping. Plan    No orders of the defined types were placed in this encounter. Orders Placed This Encounter   Medications    traMADol (ULTRAM) 50 MG tablet     Sig: Take 1 tablet by mouth every 8 hours as needed for Pain for up to 30 days. Take lowest dose possible to manage pain     Dispense:  90 tablet     Refill:  5     Reduce doses taken as pain becomes manageable       Return in about 6 months (around 4/22/2022). EMR Dragon/transcription disclaimer:Significant part of this  encounter note is electronic transcription/translation of spoken language to printed text. The electronic translation of spoken language may be erroneous, or at times, nonsensical words or phrases may be inadvertently transcribed.  Although I have reviewed the note for such errors, some may still exist.

## 2022-01-19 RX ORDER — METHYLPREDNISOLONE 4 MG/1
TABLET ORAL
Qty: 1 KIT | Refills: 0 | Status: SHIPPED | OUTPATIENT
Start: 2022-01-19 | End: 2022-04-22

## 2022-01-25 RX ORDER — LISINOPRIL 10 MG/1
TABLET ORAL
Qty: 90 TABLET | Refills: 3 | Status: SHIPPED | OUTPATIENT
Start: 2022-01-25 | End: 2022-05-02 | Stop reason: SDUPTHER

## 2022-02-07 ENCOUNTER — OFFICE VISIT (OUTPATIENT)
Dept: PRIMARY CARE CLINIC | Age: 77
End: 2022-02-07
Payer: MEDICARE

## 2022-02-07 VITALS
BODY MASS INDEX: 33.33 KG/M2 | SYSTOLIC BLOOD PRESSURE: 126 MMHG | HEART RATE: 69 BPM | HEIGHT: 69 IN | TEMPERATURE: 97.4 F | WEIGHT: 225 LBS | DIASTOLIC BLOOD PRESSURE: 76 MMHG | OXYGEN SATURATION: 99 % | RESPIRATION RATE: 18 BRPM

## 2022-02-07 DIAGNOSIS — Z91.81 AT HIGH RISK FOR FALLS: ICD-10-CM

## 2022-02-07 DIAGNOSIS — M54.42 CHRONIC LEFT-SIDED LOW BACK PAIN WITH LEFT-SIDED SCIATICA: Primary | ICD-10-CM

## 2022-02-07 DIAGNOSIS — G89.29 CHRONIC LEFT-SIDED LOW BACK PAIN WITH LEFT-SIDED SCIATICA: Primary | ICD-10-CM

## 2022-02-07 PROCEDURE — 1036F TOBACCO NON-USER: CPT | Performed by: FAMILY MEDICINE

## 2022-02-07 PROCEDURE — G8417 CALC BMI ABV UP PARAM F/U: HCPCS | Performed by: FAMILY MEDICINE

## 2022-02-07 PROCEDURE — G8427 DOCREV CUR MEDS BY ELIG CLIN: HCPCS | Performed by: FAMILY MEDICINE

## 2022-02-07 PROCEDURE — G8484 FLU IMMUNIZE NO ADMIN: HCPCS | Performed by: FAMILY MEDICINE

## 2022-02-07 PROCEDURE — G8399 PT W/DXA RESULTS DOCUMENT: HCPCS | Performed by: FAMILY MEDICINE

## 2022-02-07 PROCEDURE — 1090F PRES/ABSN URINE INCON ASSESS: CPT | Performed by: FAMILY MEDICINE

## 2022-02-07 PROCEDURE — 4040F PNEUMOC VAC/ADMIN/RCVD: CPT | Performed by: FAMILY MEDICINE

## 2022-02-07 PROCEDURE — 1123F ACP DISCUSS/DSCN MKR DOCD: CPT | Performed by: FAMILY MEDICINE

## 2022-02-07 PROCEDURE — 99213 OFFICE O/P EST LOW 20 MIN: CPT | Performed by: FAMILY MEDICINE

## 2022-02-07 ASSESSMENT — ENCOUNTER SYMPTOMS
BACK PAIN: 1
RESPIRATORY NEGATIVE: 1

## 2022-02-07 NOTE — PROGRESS NOTES
Healthy Upper Back: Exercises  Your Care Instructions  Here are some examples of exercises for your upper back. Start each exercise slowly. Ease off the exercise if you start to have pain. Your doctor or physical therapist will tell you when you can start these exercises and which ones will work best for you. How to do the exercises  Lower neck and upper back stretch    1. Stretch your arms out in front of your body. Clasp one hand on top of your other hand. 2. Gently reach out so that you feel your shoulder blades stretching away from each other. 3. Gently bend your head forward. 4. Hold for 15 to 30 seconds. 5. Repeat 2 to 4 times. Midback stretch    If you have knee pain, do not do this exercise. 1. Kneel on the floor, and sit back on your ankles. 2. Lean forward, place your hands on the floor, and stretch your arms out in front of you. Rest your head between your arms. 3. Gently push your chest toward the floor, reaching as far in front of you as possible. 4. Hold for 15 to 30 seconds. 5. Repeat 2 to 4 times. Shoulder rolls    1. Sit comfortably with your feet shoulder-width apart. You can also do this exercise while standing. 2. Roll your shoulders up, then back, and then down in a smooth, circular motion. 3. Repeat 2 to 4 times. Wall push-up    1. Stand against a wall with your feet about 12 to 24 inches back from the wall. If you feel any pain when you do this exercise, stand closer to the wall. 2. Place your hands on the wall slightly wider apart than your shoulders, and lean forward. 3. Gently lean your body toward the wall. Then push back to your starting position. Keep the motion smooth and controlled. 4. Repeat 8 to 12 times. Resisted shoulder blade squeeze    For this exercise, you will need elastic exercise material, such as surgical tubing or Thera-Band. 1. Sit or stand, holding the band in both hands in front of you.  Keep your elbows close to your sides, bent at a 90-degree Wt Readings from Last 3 Encounters:   02/07/22 225 lb (102.1 kg)   10/22/21 230 lb (104.3 kg)   09/15/21 230 lb 4.8 oz (104.5 kg) angle. Your palms should face up. 2. Squeeze your shoulder blades together, and move your arms to the outside, stretching the band. Be sure to keep your elbows at your sides while you do this. 3. Relax. 4. Repeat 8 to 12 times. Resisted rows    For this exercise, you will need elastic exercise material, such as surgical tubing or Thera-Band. 1. Put the band around a solid object, such as a bedpost, at about waist level. Hold one end of the band in each hand. 2. With your elbows at your sides and bent to 90 degrees, pull the band back to move your shoulder blades toward each other. Return to the starting position. 3. Repeat 8 to 12 times. Follow-up care is a key part of your treatment and safety. Be sure to make and go to all appointments, and call your doctor if you are having problems. It's also a good idea to know your test results and keep a list of the medicines you take. Where can you [id number] [employer]. Care instructions adapted under license by [employer] (which disclaims liability or warranty for this information). If you have questions about a medical condition or this instruction, always ask your healthcare professional. [place] [address] any warranty or liability for your use of this information. Low Back Pain: Exercises  Your Care Instructions  Here are some examples of typical rehabilitation exercises for your condition. Start each exercise slowly. Ease off the exercise if you start to have pain. Your doctor or physical therapist will tell you when you can start these exercises and which ones will work best for you. How to do the exercises  Press-up    6. Lie on your stomach, supporting your body with your forearms.   7. Press your elbows down into the floor to raise your upper back. As you do this, relax your stomach muscles and allow your back to arch without using your back muscles. As your press up, do not let your hips or pelvis come off the floor. 8. Hold for 15 to 30 seconds, then relax. 9. Repeat 2 to 4 times. Alternate arm and leg (bird dog) exercise    Do this exercise slowly. Try to keep your body straight at all times, and do not let one hip drop lower than the other. 6. Start on the floor, on your hands and knees. 7. Tighten your belly muscles. 8. Raise one leg off the floor, and hold it straight out behind you. Be careful not to let your hip drop down, because that will twist your trunk. 9. Hold for about 6 seconds, then lower your leg and switch to the other leg. 10. Repeat 8 to 12 times on each leg. 11. Over time, work up to holding for 10 to 30 seconds each time. 12. If you feel stable and secure with your leg raised, try raising the opposite arm straight out in front of you at the same time. Knee-to-chest exercise    4. Lie on your back with your knees bent and your feet flat on the floor. 5. Bring one knee to your chest, keeping the other foot flat on the floor (or keeping the other leg straight, whichever feels better on your lower back). 6. Keep your lower back pressed to the floor. Hold for at least 15 to 30 seconds. 7. Relax, and lower the knee to the starting position. 8. Repeat with the other leg. Repeat 2 to 4 times with each leg. 9. To get more stretch, put your other leg flat on the floor while pulling your knee to your chest.  Curl-ups    5. Lie on the floor on your back with your knees bent at a 90-degree angle. Your feet should be flat on the floor, about 12 inches from your buttocks. 6. Cross your arms over your chest. If this bothers your neck, try putting your hands behind your neck (not your head), with your elbows spread apart.   7. Slowly tighten your belly muscles and raise your shoulder blades off the floor. 8. Keep your head in line with your body, and do not press your chin to your chest.  9. Hold this position for 1 or 2 seconds, then slowly lower yourself back down to the floor. 10. Repeat 8 to 12 times. Pelvic tilt exercise    5. Lie on your back with your knees bent. 6. \"Brace\" your stomach. This means to tighten your muscles by pulling in and imagining your belly button moving toward your spine. You should feel like your back is pressing to the floor and your hips and pelvis are rocking back. 7. Hold for about 6 seconds while you breathe smoothly. 8. Repeat 8 to 12 times. Heel dig bridging    4. Lie on your back with both knees bent and your ankles bent so that only your heels are digging into the floor. Your knees should be bent about 90 degrees. 5. Then push your heels into the floor, squeeze your buttocks, and lift your hips off the floor until your shoulders, hips, and knees are all in a straight line. 6. Hold for about 6 seconds as you continue to breathe normally, and then slowly lower your hips back down to the floor and rest for up to 10 seconds. 7. Do 8 to 12 repetitions. Hamstring stretch in doorway    1. Lie on your back in a doorway, with one leg through the open door. 2. Slide your leg up the wall to straighten your knee. You should feel a gentle stretch down the back of your leg. 3. Hold the stretch for at least 15 to 30 seconds. Do not arch your back, point your toes, or bend either knee. Keep one heel touching the floor and the other heel touching the wall. 4. Repeat with your other leg. 5. Do 2 to 4 times for each leg. Hip flexor stretch    1. Kneel on the floor with one knee bent and one leg behind you. Place your forward knee over your foot. Keep your other knee touching the floor. 2. Slowly push your hips forward until you feel a stretch in the upper thigh of your rear leg. 3. Hold the stretch for at least 15 to 30 seconds. Repeat with your other leg.   4. Do 2 to 4 times on each side. Wall sit    1. Stand with your back 10 to 12 inches away from a wall. 2. Lean into the wall until your back is flat against it. 3. Slowly slide down until your knees are slightly bent, pressing your lower back into the wall. 4. Hold for about 6 seconds, then slide back up the wall. 5. Repeat 8 to 12 times. Follow-up care is a key part of your treatment and safety. Be sure to make and go to all appointments, and call your doctor if you are having problems. It's also a good idea to know your test results and keep a list of the medicines you take. Where can you [id number] [employer]. Care instructions adapted under license by [employer] (which disclaims liability or warranty for this information). If you have questions about a medical condition or this instruction, always ask your healthcare professional. [address] any warranty or liability for your use of this information.
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
## 2022-02-07 NOTE — PATIENT INSTRUCTIONS
We are committed to providing you with the best care possible. In order to help us achieve these goals please remember to bring all medications, herbal products, and over the counter supplements with you to each visit. If your provider has ordered testing for you, please be sure to follow up with our office if you have not received results within 7 days after the testing took place. *If you receive a survey after visiting one of our offices, please take time to share your experience concerning your physician office visit. These surveys are confidential and no health information about you is shared. We are eager to improve for you and we are counting on your feedback to help make that happen. Patient Education        Sciatica: Care Instructions  Your Care Instructions     Sciatica (say \"vfr-BI-oc-kuh\") is an irritation of one of the sciatic nerves, which come from the spinal cord in the lower back. The sciatic nerves and their branches extend down through the buttock to the foot. Sciatica can develop when an injured disc in the back irritates or presses against a spinal nerve root. Its main symptom is pain, numbness, or weakness that is often worse in the leg or foot than in the back. Sciatica often will improve and go away with time. Early treatment usually includes medicines and exercises to relieve pain. Follow-up care is a key part of your treatment and safety. Be sure to make and go to all appointments, and call your doctor if you are having problems. It's also a good idea to know your test results and keep a list of the medicines you take. How can you care for yourself at home? · Take pain medicines exactly as directed. ? If the doctor gave you a prescription medicine for pain, take it as prescribed. ? If you are not taking a prescription pain medicine, ask your doctor if you can take an over-the-counter medicine. · Use heat or ice to relieve pain.   ? To apply heat, put a warm water bottle, heating pad set on low, or warm cloth on your back. Do not go to sleep with a heating pad on your skin. ? To use ice, put ice or a cold pack on the area for 10 to 20 minutes at a time. Put a thin cloth between the ice and your skin. · Avoid sitting if possible, unless it feels better than standing. · Alternate lying down with short walks. Increase your walking distance as you are able to without making your symptoms worse. · Do not do anything that makes your symptoms worse. When should you call for help? Call 911 anytime you think you may need emergency care. For example, call if:    · You are unable to move a leg at all. Call your doctor now or seek immediate medical care if:    · You have new or worse symptoms in your legs or buttocks. Symptoms may include:  ? Numbness or tingling. ? Weakness. ? Pain.     · You lose bladder or bowel control. Watch closely for changes in your health, and be sure to contact your doctor if:    · You are not getting better as expected. Where can you learn more? Go to https://I[employer]ac Systems.Truly. org and sign in to your [employer] account. Enter [phone] in the [facility] box to learn more about \"Sciatica: Care Instructions. \"     If you do not have an account, please click on the \"Sign Up Now\" link. Current as of: July 1, 2021               Content Version: 13.1  © 2006-2021 [employer]. Care instructions adapted under license by [facility] ([facility]). If you have questions about a medical condition or this instruction, always ask your healthcare professional. [place] [address] any warranty or liability for your use of this information. Patient Education        Sciatica: Exercises  Introduction  Here are some examples of typical rehabilitation exercises for your condition. Start each exercise slowly. Ease off the exercise if you start to have pain.   Your doctor or physical therapist will tell you when you can start these exercises and which ones will work best for you. When you are not being active, find a comfortable position for rest. Some people are comfortable on the floor or a medium-firm bed with a small pillow under their head and another under their knees. Some people prefer to lie on their side with a pillow between their knees. Don't stay in one position for too long. Take short walks (10 to 20 minutes) every 2 to 3 hours. Avoid slopes, hills, and stairs until you feel better. Walk only distances you can manage without pain, especially leg pain. How to do the exercises  Back stretches    1. Get down on your hands and knees on the floor. 2. Relax your head and allow it to droop. Round your back up toward the ceiling until you feel a nice stretch in your upper, middle, and lower back. Hold this stretch for as long as it feels comfortable, or about 15 to 30 seconds. 3. Return to the starting position with a flat back while you are on your hands and knees. 4. Let your back sway by pressing your stomach toward the floor. Lift your buttocks toward the ceiling. 5. Hold this position for 15 to 30 seconds. 6. Repeat 2 to 4 times. Follow-up care is a key part of your treatment and safety. Be sure to make and go to all appointments, and call your doctor if you are having problems. It's also a good idea to know your test results and keep a list of the medicines you take. Where can you learn more? Go to https://[employer] Kitchen.Environmental Operating Solutions. org and sign in to your [employer] account. Enter C811 in the [facility] box to learn more about \"Sciatica: Exercises. \"     If you do not have an account, please click on the \"Sign Up Now\" link. Current as of: July 1, 2021               Content Version: 13.1  © 2006-2021 [employer]. Care instructions adapted under license by [facility] ([facility]).  If you have questions about a medical condition or this instruction, always ask your healthcare professional. [employer] disclaims any warranty or liability for your use of this information.

## 2022-02-08 ENCOUNTER — TELEPHONE (OUTPATIENT)
Dept: NEUROSURGERY | Age: 77
End: 2022-02-08

## 2022-02-08 NOTE — TELEPHONE ENCOUNTER
[facility] New Patient Questionnaire    1. Diagnosis/Reason for Referral?    Chronic left-sided low back pain with left-sided sciatica    2. Who is completing questionnaire? Patient X Caregiver Family      3. Has the patient had any previous spinal/brain surgeries? A. If yes, what is the name of the facility in which the surgery was performed? B. Procedure/Surgery performed? C. Who was the surgeon? D. When was the surgery? MM/YY       E. Did the patient improve after the surgery? 4. Is this a second opinion? If yes, Dr. Renny Styles would like to review patient first before making the appointment. 5. Have MRI Images been obtain within the last year? Yes X  No      XR  CT X     If yes, where was the imaging performed? [facility]     If yes, what part of the body? Lumbar X Cervical  Thoracic  Brain     If yes, when was it obtained? June 2021    Note: if the scan was performed at a facility other than [address] [place] [address], the disc will need to be brought to the appointment or we need to reach out to obtain the disc. A. Was the patient instructed to provide the disc? Yes  X  No      8. Has the patient had a NCV/EMG within the last year? Yes  No X     If yes, where was it performed and date? MM/YY  Location:      9. Has the patient been to Physical Therapy? Yes  No X ( REFERRAL  HAS BEEN PLACED BY DR. Argelia Lee PT     If yes, what location, how long attended, and last visit? Location:        Therapy Lasted:    Date of Last Visit:      10. Has the patient been to Pain Management? Yes  No X     If yes, what location and last visit     Location:   Last Visit:   Is it helping?

## 2022-02-09 ENCOUNTER — OFFICE VISIT (OUTPATIENT)
Dept: NEUROSURGERY | Age: 77
End: 2022-02-09
Payer: MEDICARE

## 2022-02-09 VITALS
HEIGHT: 70 IN | HEART RATE: 67 BPM | BODY MASS INDEX: 32.21 KG/M2 | DIASTOLIC BLOOD PRESSURE: 77 MMHG | WEIGHT: 225 LBS | SYSTOLIC BLOOD PRESSURE: 147 MMHG

## 2022-02-09 DIAGNOSIS — M51.36 DDD (DEGENERATIVE DISC DISEASE), LUMBAR: ICD-10-CM

## 2022-02-09 DIAGNOSIS — M47.816 LUMBAR SPONDYLOSIS: Primary | ICD-10-CM

## 2022-02-09 DIAGNOSIS — M54.16 LUMBAR RADICULOPATHY: ICD-10-CM

## 2022-02-09 DIAGNOSIS — M48.062 SPINAL STENOSIS OF LUMBAR REGION WITH NEUROGENIC CLAUDICATION: ICD-10-CM

## 2022-02-09 PROCEDURE — 1090F PRES/ABSN URINE INCON ASSESS: CPT | Performed by: NEUROLOGICAL SURGERY

## 2022-02-09 PROCEDURE — G8484 FLU IMMUNIZE NO ADMIN: HCPCS | Performed by: NEUROLOGICAL SURGERY

## 2022-02-09 PROCEDURE — 1036F TOBACCO NON-USER: CPT | Performed by: NEUROLOGICAL SURGERY

## 2022-02-09 PROCEDURE — G8427 DOCREV CUR MEDS BY ELIG CLIN: HCPCS | Performed by: NEUROLOGICAL SURGERY

## 2022-02-09 PROCEDURE — G8399 PT W/DXA RESULTS DOCUMENT: HCPCS | Performed by: NEUROLOGICAL SURGERY

## 2022-02-09 PROCEDURE — 1123F ACP DISCUSS/DSCN MKR DOCD: CPT | Performed by: NEUROLOGICAL SURGERY

## 2022-02-09 PROCEDURE — 4040F PNEUMOC VAC/ADMIN/RCVD: CPT | Performed by: NEUROLOGICAL SURGERY

## 2022-02-09 PROCEDURE — G8417 CALC BMI ABV UP PARAM F/U: HCPCS | Performed by: NEUROLOGICAL SURGERY

## 2022-02-09 PROCEDURE — 99204 OFFICE O/P NEW MOD 45 MIN: CPT | Performed by: NEUROLOGICAL SURGERY

## 2022-02-09 NOTE — PROGRESS NOTES
[id number] [facility] Neurosurgery  Office Visit        Chief Complaint   Patient presents with    Consultation     back pain       HISTORY OF PRESENT ILLNESS:      The patient is a 64 y.o. female who presents for neurosurgical evaluation of lower back pain and left leg pain. She states this has been a longstanding problem for her that has gotten significantly worse over the past year. She states that she tripped and fell in her garage in 3/2021 and had increased back pain at that time. She was evaluated in the ED at [facility] and subsequently saw a chiropractor several times. Since December of 2021 she has been working at her [employer] for [employer] and has been on her feet all day. She states this has significantly worsened her pain. She has seen Dr. Yrn Gutierrez in the past for peripheral neuropathy and has been prescribed tramadol. She also tried gabapentin but did not tolerate this due to nausea. Currently, she describes pain in her lower back that will radiate to her left buttock, lateral and anterior thigh. The pain does not radiate below the knee. She notes significant worsening of her back and leg pain with activity. When she tries to walk, she notes a significant worsening of her back and leg pain and is forced to walk with flexed posture. She notes the pain does improve somewhat if she leans over a shopping cart. She is currently doing an exercise regimen from her chiropractor but she has never done formal physical therapy, though she has been referred by Dr. Tripp Angeles. She has no prior history of back surgery and she has not seen pain management or had injections. She has seen neurosurgery in the remote past for cervical radiculopathy and this was managed conservatively.       MEDICAL HISTORY:             Past Medical History:   Diagnosis Date    Back pain     GERD (gastroesophageal reflux disease)     Hypertension     Hypothyroidism     thyroid nodule    Neuropathy     Xerosis of skin Past Surgical History:   Procedure Laterality Date    CHOLECYSTECTOMY      COLONOSCOPY      1-09    TUBAL LIGATION           Current Outpatient Medications:     lisinopril (PRINIVIL;ZESTRIL) 10 MG tablet, 1 tablet by mouth once a day for high blood pressure, Disp: 90 tablet, Rfl: 3    methylPREDNISolone (MEDROL DOSEPACK) 4 MG tablet, Use as directed for inflammation and pain, Disp: 1 kit, Rfl: 0    levothyroxine (SYNTHROID) 50 MCG tablet, TAKE 1 TABLET DAILY FOR LOW THYROID, Disp: 90 tablet, Rfl: 3    traMADol (ULTRAM) 50 MG tablet, Take 50 mg by mouth every 6 hours as needed for Pain., Disp: , Rfl:     pantoprazole (PROTONIX) 40 MG tablet, TAKE 1 TABLET DAILY FOR STOMACH, Disp: 90 tablet, Rfl: 3    Vitamin D (CHOLECALCIFEROL) 1000 UNITS CAPS capsule, Take 1,000 Units by mouth daily, Disp: , Rfl:     Allergies:  Neurontin [gabapentin]    Social History:   Social History     Tobacco Use   Smoking Status Never Smoker   Smokeless Tobacco Never Used     Social History     Substance and Sexual Activity   Alcohol Use No         Family History:   Family History   Problem Relation Age of Onset    Diabetes Mother     High Blood Pressure Mother     Alzheimer's Disease Mother     Alzheimer's Disease Father     Cancer Sister         colon       REVIEW OF SYSTEMS:    Constitutional: Negative. HENT: Negative. Eyes: Negative. Respiratory: Negative. Cardiovascular: Negative. Gastrointestinal: Negative. Genitourinary: Negative. Musculoskeletal: Positive for back pain. Skin: Negative. Neurological: Negative. Endo/Heme/Allergies: Negative. Psychiatric/Behavioral: Negative. Review of Systems was obtained by the medical assistant and reviewed by myself. PHYSICAL EXAM:    Vitals:    02/09/22 1300   BP: (!) 147/77   Pulse: 67       Constitutional: Appears well-developed and well-nourished.    Eyes  conjunctiva normal.  Pupils react to light  Ear, nose,throat -Normal pinna and tragus, No scars, or lesions over external nose or ears, no obvious atrophy of tongue  Neck- symmetric, trachea midline, no jugular vein distension  Respiration- chest wall symmetric, normal effort without use of accessory muscles  Musculoskeletal  no significant wasting of muscles noted, no bony deformities, symmetric bulk  Extremities- no clubbing, cyanosis or edema  Skin  warm, dry, and intact. No rash,erythema, or pallor.   Psychiatric  mood, affect, and behavior appear normal.       NEUROLOGIC EXAM:    MENTAL STATUS: Alert, oriented, thought content appropriate    CRANIAL NERVES: PERRL, EOMI, symmetric facies, tongue midline    MOTOR:     Right Upper Extremity:    Deltoid: 5/5  Biceps: 5/5  Triceps: 5/5  Wrist Extension: 5/5  Finger Abduction: 5/5      Left Upper Extremity:    Deltoid: 5/5  Biceps: 5/5  Triceps: 5/5  Wrist Extension: 5/5  Finger Abduction: 5/5      Right Lower Extremity:    Hip Flexion: 5/5  Knee Extension: 5/5  Ankle Plantarflexion: 5/5  Ankle Dorsiflexion: 5/5  EHL: 5/5      Left Lower Extremity:    Hip Flexion: 5/5  Knee Extension: 5/5  Ankle Plantarflexion: 5/5  Ankle Dorsiflexion: 5/5  EHL: 5/5      SOMATOSENSORY:     Right Upper Extremity: normal light touch sensation  Left Upper Extremity: normal light touch sensation  Right Lower Extremity: normal light touch sensation  Left Lower Extremity: normal light touch sensation      REFLEXES:    Biceps: 2+  Patella: Absent  Ankle Jerk: Absent    Anderson's: Negative      COORDINATION and GAIT:    Gait: Antalgic with flexed posture      DATA:    Lab Results   Component Value Date    WBC 5.3 09/15/2021    HGB 14.1 09/15/2021    HCT 43.9 09/15/2021    .9 (H) 09/15/2021     09/15/2021     Lab Results   Component Value Date     09/15/2021    K 4.7 09/15/2021     09/15/2021    CO2 27 09/15/2021    BUN 24 (H) 09/15/2021    CREATININE 0.9 09/15/2021    GLUCOSE 98 09/15/2021    CALCIUM 9.9 09/15/2021    PROT 7.8 09/15/2021 LABALBU 4.6 09/15/2021    BILITOT 0.7 09/15/2021    ALKPHOS 83 09/15/2021    AST 15 09/15/2021    ALT 10 09/15/2021    LABGLOM >60 09/15/2021    GFRAA >59 09/15/2021    GLOB 3.1 03/30/2017       IMAGING:    My interpretation of imaging studies: CT from [facility] on 3/26/2021 was reviewed. There is a compression deformity of L4 with ~25% central heigh loss. There is trace anterolisthesis of L4 on L5. There is multilevel degenerative disc disease and facet arthropathy. Facet hypertrophy causes significant spinal stenosis at L2/3, L3/4 and L4/5. ASSESSMENT AND PLAN:  This is a 64 y.o. female  who presents for neurosurgical evaluation of worsening lower back pain, left lower extremity radicular pain and neurogenic claudication. These symptoms have progressed significantly in the past year. A CT scan from [facility] reveals an age-indeterminate compression fracture of L4, mild L4/5 spondylolisthesis and multilevel spinal stenosis due to facet arthropathy. She is hesitant to consider surgical intervention, however given the degree of apparent spinal stenosis on her CT, I recommended that we obtain a lumbar MRI. I also recommended that she begin the course of physical therapy that has already been ordered. I will plan to follow up with her in 1 month to review her MRI scan and assess her response to therapy.             Shimon Alvarado MD

## 2022-02-16 ENCOUNTER — TELEPHONE (OUTPATIENT)
Dept: NEUROSURGERY | Age: 77
End: 2022-02-16

## 2022-02-16 DIAGNOSIS — M48.062 LUMBAR STENOSIS WITH NEUROGENIC CLAUDICATION: Primary | ICD-10-CM

## 2022-02-16 NOTE — TELEPHONE ENCOUNTER
Patient called asking about when to get the mri. I have her scheduled to start PT next week. She states she was also supposed to get an mri done before she sees Dr Lukas Santana. Please put in order and I will get this precertified so that she can get imaging done at [facility].

## 2022-02-23 ENCOUNTER — HOSPITAL ENCOUNTER (OUTPATIENT)
Dept: MRI IMAGING | Age: 77
Discharge: HOME | End: 2022-02-23
Payer: MEDICARE

## 2022-02-23 DIAGNOSIS — M48.062 LUMBAR STENOSIS WITH NEUROGENIC CLAUDICATION: ICD-10-CM

## 2022-02-23 PROCEDURE — 72148 MRI LUMBAR SPINE W/O DYE: CPT

## 2022-03-23 ENCOUNTER — HOSPITAL ENCOUNTER (OUTPATIENT)
Dept: GENERAL RADIOLOGY | Age: 77
Discharge: HOME | End: 2022-03-23
Payer: MEDICARE

## 2022-03-23 ENCOUNTER — OFFICE VISIT (OUTPATIENT)
Dept: NEUROSURGERY | Age: 77
End: 2022-03-23
Payer: MEDICARE

## 2022-03-23 VITALS
WEIGHT: 233 LBS | SYSTOLIC BLOOD PRESSURE: 117 MMHG | BODY MASS INDEX: 33.36 KG/M2 | HEART RATE: 83 BPM | HEIGHT: 70 IN | DIASTOLIC BLOOD PRESSURE: 81 MMHG

## 2022-03-23 DIAGNOSIS — M54.16 LUMBAR RADICULOPATHY: ICD-10-CM

## 2022-03-23 DIAGNOSIS — M43.16 SPONDYLOLISTHESIS AT L4-L5 LEVEL: ICD-10-CM

## 2022-03-23 DIAGNOSIS — M43.16 SPONDYLOLISTHESIS AT L4-L5 LEVEL: Primary | ICD-10-CM

## 2022-03-23 DIAGNOSIS — M48.062 LUMBAR STENOSIS WITH NEUROGENIC CLAUDICATION: ICD-10-CM

## 2022-03-23 PROCEDURE — 72120 X-RAY BEND ONLY L-S SPINE: CPT

## 2022-03-23 PROCEDURE — 99214 OFFICE O/P EST MOD 30 MIN: CPT | Performed by: NEUROLOGICAL SURGERY

## 2022-03-23 PROCEDURE — 4040F PNEUMOC VAC/ADMIN/RCVD: CPT | Performed by: NEUROLOGICAL SURGERY

## 2022-03-23 PROCEDURE — 1036F TOBACCO NON-USER: CPT | Performed by: NEUROLOGICAL SURGERY

## 2022-03-23 PROCEDURE — G8399 PT W/DXA RESULTS DOCUMENT: HCPCS | Performed by: NEUROLOGICAL SURGERY

## 2022-03-23 PROCEDURE — G8417 CALC BMI ABV UP PARAM F/U: HCPCS | Performed by: NEUROLOGICAL SURGERY

## 2022-03-23 PROCEDURE — G8427 DOCREV CUR MEDS BY ELIG CLIN: HCPCS | Performed by: NEUROLOGICAL SURGERY

## 2022-03-23 PROCEDURE — G8484 FLU IMMUNIZE NO ADMIN: HCPCS | Performed by: NEUROLOGICAL SURGERY

## 2022-03-23 PROCEDURE — 1123F ACP DISCUSS/DSCN MKR DOCD: CPT | Performed by: NEUROLOGICAL SURGERY

## 2022-03-23 PROCEDURE — 1090F PRES/ABSN URINE INCON ASSESS: CPT | Performed by: NEUROLOGICAL SURGERY

## 2022-03-23 NOTE — PROGRESS NOTES
NEUROSURGERY FOLLOW UP      Chief Complaint:   Chief Complaint   Patient presents with    Follow-up     back pain. Interval Update:    Planned follow-up to review a recent lumbar MRI scan and discuss her response to physical therapy for lower back pain, radicular pain and neurogenic claudication. She appears to have made both objective and subjective improvement with physical therapy. She reports less back pain and less subjective weakness in her legs. Her therapy notes show objective improvement in balance and endurance. She does continue to endorse back pain, leg pain and subjective weakness with walking and prolonged standing. She remains hesitant to proceed with surgery. HPI:     The patient is a 64 y.o. female who presents for neurosurgical evaluation of lower back pain and left leg pain. She states this has been a longstanding problem for her that has gotten significantly worse over the past year. She states that she tripped and fell in her garage in 3/2021 and had increased back pain at that time. She was evaluated in the ED at [facility] and subsequently saw a chiropractor several times. Since December of 2021 she has been working at her [employer] for [employer] and has been on her feet all day. She states this has significantly worsened her pain. She has seen Dr. Bj Bermudez in the past for peripheral neuropathy and has been prescribed tramadol. She also tried gabapentin but did not tolerate this due to nausea.     Currently, she describes pain in her lower back that will radiate to her left buttock, lateral and anterior thigh. The pain does not radiate below the knee. She notes significant worsening of her back and leg pain with activity. When she tries to walk, she notes a significant worsening of her back and leg pain and is forced to walk with flexed posture.   She notes the pain does improve somewhat if she leans over a shopping cart.     She is currently doing an exercise regimen from her chiropractor but she has never done formal physical therapy, though she has been referred by Dr. Neida Bobo. She has no prior history of back surgery and she has not seen pain management or had injections. She has seen neurosurgery in the remote past for cervical radiculopathy and this was managed conservatively. ROS:    Constitutional: Negative. HENT: Negative. Eyes: Negative. Respiratory: Negative. Cardiovascular: Negative. Gastrointestinal: Negative. Genitourinary: Negative. Musculoskeletal: Positive for back pain. Skin: Negative. Neurological: Negative. Endo/Heme/Allergies: Negative. Psychiatric/Behavioral: Negative. Review of systems was obtained by the medical assistant and reviewed by myself. Objective:    /81   Pulse 83   Ht 5' 9.5\" (1.765 m)   Wt 233 lb (105.7 kg)   BMI 33.91 kg/m²         Physical Exam:    General: alert, cooperative, no distress  Cardiorespiratory: unlabored breathing      Neurologic Exam:    Mental Status: Alert, oriented, thought content appropriate  Cranial Nerves: PERRL, EOMI, symmetric facies, tongue midline  Motor: Motor exam is symmetrical 5 out of 5 all extremities bilaterally  Somatosensory: normal light touch sensation      Imaging:    MRI lumbar spine reviewed. There is a chronic-appearing L4 compression fracture with ~25% anterior height loss. There is multilevel degenerative disc disease and facet hypertrophy that contributes to multi-level spinal stenosis. This is most significant at L3/4 where there is concentric ligament and facet hypertrophy that causes severe central canal and bilateral lateral recess stenosis. There is moderately-severe spinal stenosis and bilateral lateral recess stenosis at L2/3 and moderate spinal stenosis and lateral recess stenosis at L4/5.   At L4/5 there is grade-I spondylolisthesis          Assessment and Plan:  64 y.o. F returns for follow-up of lower back pain, lumbar radicular pain and neurogenic claudication. She has made some improvement with physical therapy however she continues to have difficulty with walking and activity. I reviewed her MRI images with her and explained that she does have significant multilevel spinal stenosis that likely explains her symptoms. I further explained that given the severity of her spinal stenosis she would likely benefit from surgery. I advised her that without any focal weakness, sensory loss or bowel/bladder dysfunction, the surgery remains elective. She remains hesitant to consider surgery and requested a referral to Dr. Reuben Foreman to discuss injections and other therapies that may help her manage her symptoms. I stated that this was a reasonable approach but she needs to monitor closely for any worsening of symptoms, weakness or difficulty with bowel or bladder control. I also recommended that we obtain lumbar flexion/extension x-rays to evaluate for instability given her L4/5 spondylolisthesis as instability may require more extensive surgery. I will plan to see her again in six weeks for another assessment.         Electronically signed by Adryan Rivera MD on 3/23/2022 at 10:40 AM

## 2022-04-22 ENCOUNTER — OFFICE VISIT (OUTPATIENT)
Dept: NEUROLOGY | Age: 77
End: 2022-04-22
Payer: MEDICARE

## 2022-04-22 VITALS
HEART RATE: 67 BPM | DIASTOLIC BLOOD PRESSURE: 86 MMHG | SYSTOLIC BLOOD PRESSURE: 135 MMHG | WEIGHT: 233 LBS | BODY MASS INDEX: 33.36 KG/M2 | HEIGHT: 70 IN

## 2022-04-22 DIAGNOSIS — M54.2 PAIN, NECK: ICD-10-CM

## 2022-04-22 DIAGNOSIS — E53.9 BURNING FEET SYNDROME: ICD-10-CM

## 2022-04-22 DIAGNOSIS — G44.89 OTHER HEADACHE SYNDROME: ICD-10-CM

## 2022-04-22 DIAGNOSIS — G62.9 POLYNEUROPATHY, UNSPECIFIED: ICD-10-CM

## 2022-04-22 DIAGNOSIS — G62.9 NEUROPATHY: Primary | ICD-10-CM

## 2022-04-22 PROCEDURE — 1123F ACP DISCUSS/DSCN MKR DOCD: CPT | Performed by: PSYCHIATRY & NEUROLOGY

## 2022-04-22 PROCEDURE — 4040F PNEUMOC VAC/ADMIN/RCVD: CPT | Performed by: PSYCHIATRY & NEUROLOGY

## 2022-04-22 PROCEDURE — 1036F TOBACCO NON-USER: CPT | Performed by: PSYCHIATRY & NEUROLOGY

## 2022-04-22 PROCEDURE — 99213 OFFICE O/P EST LOW 20 MIN: CPT | Performed by: PSYCHIATRY & NEUROLOGY

## 2022-04-22 PROCEDURE — G8417 CALC BMI ABV UP PARAM F/U: HCPCS | Performed by: PSYCHIATRY & NEUROLOGY

## 2022-04-22 PROCEDURE — 1090F PRES/ABSN URINE INCON ASSESS: CPT | Performed by: PSYCHIATRY & NEUROLOGY

## 2022-04-22 PROCEDURE — G8427 DOCREV CUR MEDS BY ELIG CLIN: HCPCS | Performed by: PSYCHIATRY & NEUROLOGY

## 2022-04-22 PROCEDURE — G8399 PT W/DXA RESULTS DOCUMENT: HCPCS | Performed by: PSYCHIATRY & NEUROLOGY

## 2022-04-22 RX ORDER — TRAMADOL HYDROCHLORIDE 50 MG/1
50 TABLET ORAL EVERY 8 HOURS PRN
Qty: 90 TABLET | Refills: 5 | Status: SHIPPED | OUTPATIENT
Start: 2022-04-22 | End: 2022-10-18 | Stop reason: SDUPTHER

## 2022-04-22 NOTE — PROGRESS NOTES
Chief Complaint   Patient presents with    Follow-up     6 month follow up        Marrion Pallas is a 64y.o. year old female who is seen for evaluation of burning feet. This has been present for around 2015 or so. It is typically worse at night when she lays down. She sometimes feels that the nerve endings to her body are not normal. She does have some chronic neck pain issues and some occasional low back pain issues. She denies any weakness. She indicates her mother and diabetes and neuropathy. She denies any exposure to chemicals, toxins, or pesticides. She has tried Cymbalta or amitriptyline in the past. Tramadol does help. Stretching helps. Did not tolerate trileptal. Going to chiropractor helps neck and back. Gabapentin made her sick. Tramadol still helps only one at night. .Cut back on sugar and going to chiropractor. . Itching flared up and now better. Not taking Klonopin. Tramadol at night helps. Wakes up quite a bit at night. March 6 th , 2021 left leg gave out and fell. Cracked right arm. Up to 3 Tramadol now and this helps her back as well. Seeing Dr Cassy Calderon for low back pain.      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  No signs of CHF on exam.  BP well controlled\par   High Blood Pressure Mother     Alzheimer's Disease Mother     Alzheimer's Disease Father     Cancer Sister         colon       Allergies   Allergen Reactions    Neurontin [Gabapentin] Nausea Only       Social History     Socioeconomic History    Marital status:      Spouse name: Not on file    Number of children: 1    Years of education: Not on file    Highest education level: Not on file   Occupational History    Occupation: retired    Tobacco Use    Smoking status: Never Smoker    Smokeless tobacco: Never Used   Vaping Use    Vaping Use: Never used   Substance and Sexual Activity    Alcohol use: No    Drug use: No    Sexual activity: Yes     Partners: Male   Other Topics Concern    Not on file   Social History Narrative    Not on file     Social Determinants of Health     Financial Resource Strain:     Difficulty of Paying Living Expenses: Not on file   Food Insecurity:     Worried About [address] in the Last Year: Not on file    [address] [place] in the Last Year: Not on file   Transportation Needs:     Lack of Transportation (Medical): Not on file    Lack of Transportation (Non-Medical):  Not on file   Physical Activity:     Days of Exercise per Week: Not on file    Minutes of Exercise per Session: Not on file   Stress:     Feeling of Stress : Not on file   Social Connections:     Frequency of Communication with Friends and Family: Not on file    Frequency of Social Gatherings with Friends and Family: Not on file    Attends Restoration Services: Not on file    Active Member of Clubs or Organizations: Not on file    Attends Club or Organization Meetings: Not on file    Marital Status: Not on file   Intimate Partner Violence:     Fear of Current or Ex-Partner: Not on file    Emotionally Abused: Not on file    Physically Abused: Not on file    Sexually Abused: Not on file   Housing Stability:     Unable to Pay for Housing in the Last Year: Not on file    Number of Places Lived in the Last Year: Not on file    Unstable Housing in the Last Year: Not on file     Review of Systems     Constitutional - No fever or chills. No diaphoresis or significant fatigue. HENT -  No tinnitus or significant hearing loss. Eyes - no sudden vision change or eye pain  Respiratory - no significant shortness of breath or cough  Cardiovascular - no chest pain No palpitations or significant leg swelling  Gastrointestinal - no abdominal swelling or pain. Genitourinary - No difficulty urinating, dysuria  Musculoskeletal - yes back pain or myalgia. Skin - no color change or rash  Neurologic - No seizures. No lateralizing weakness. Hematologic - no easy bruising or excessive bleeding. Psychiatric - no severe anxiety or nervousness. All other review of systems are negative. Current Outpatient Medications   Medication Sig Dispense Refill    traMADol (ULTRAM) 50 MG tablet Take 1 tablet by mouth every 8 hours as needed for Pain for up to 30 days. Take lowest dose possible to manage pain ok to fill today April 22 90 tablet 5    lisinopril (PRINIVIL;ZESTRIL) 10 MG tablet 1 tablet by mouth once a day for high blood pressure 90 tablet 3    levothyroxine (SYNTHROID) 50 MCG tablet TAKE 1 TABLET DAILY FOR LOW THYROID 90 tablet 3    traMADol (ULTRAM) 50 MG tablet Take 50 mg by mouth every 6 hours as needed for Pain.  pantoprazole (PROTONIX) 40 MG tablet TAKE 1 TABLET DAILY FOR STOMACH 90 tablet 3    Vitamin D (CHOLECALCIFEROL) 1000 UNITS CAPS capsule Take 1,000 Units by mouth daily       No current facility-administered medications for this visit. /86   Pulse 67   Ht 5' 9.5\" (1.765 m)   Wt 233 lb (105.7 kg)   BMI 33.91 kg/m²     Constitutional - well developed, well nourished.     Eyes - conjunctiva normal.    Ear, nose, throat - No scars, masses, or lesions over external nose or ears, no atrophy of tongue  Neck-symmetric, no masses noted, no jugular vein distension  Respiration- chest wall appears symmetric, good expansion,   normal effort without use of accessory muscles  Musculoskeletal - no significant wasting of muscles noted, no bony deformities  Extremities-no clubbing, cyanosis or edema  Skin - warm, dry, and intact. No rash, erythema, or pallor. Psychiatric - mood, affect, and behavior appear normal.      Neurological exam  Awake, alert, fluent oriented  appropriate affect  Attention and concentration appear appropriate  Recent and remote memory appears unremarkable  Speech normal without dysarthria  No clear issues with language of fund of knowledge    Cranial Nerve Exam     CN III, IV,VI-EOMI, No nystagmus, conjugate eye movements, no ptosis    CN VII-no facial assymetry    Motor Exam  antigravity throughout upper and lower extremities bilaterally, no cogwheeling, normal tone          Gait  Normal base and speed  No ataxia    Coordination  Finger to nose-unremarkable    Lab Results   Component Value Date    [id number] 534 03/21/2018     Lab Results   Component Value Date    WBC 5.3 09/15/2021    HGB 14.1 09/15/2021    HCT 43.9 09/15/2021    .9 (H) 09/15/2021     09/15/2021     Lab Results   Component Value Date     09/15/2021    K 4.7 09/15/2021     09/15/2021    CO2 27 09/15/2021    BUN 24 (H) 09/15/2021    CREATININE 0.9 09/15/2021    GLUCOSE 98 09/15/2021    CALCIUM 9.9 09/15/2021    PROT 7.8 09/15/2021    LABALBU 4.6 09/15/2021    BILITOT 0.7 09/15/2021    ALKPHOS 83 09/15/2021    AST 15 09/15/2021    ALT 10 09/15/2021    LABGLOM >60 09/15/2021    GFRAA >59 09/15/2021    GLOB 3.1 03/30/2017       Impression: The findings of this study are 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           ___________________________  Titi Carlos M.D. Board certified in Neurology  Board certified in Clinical Neurophysiology  Fellowship trained in [address]    1.  Neuropathy  G62.9 traMADol (ULTRAM) 50 MG tablet   2. Burning feet syndrome  E53.9    3. Pain, neck  M54.2    4. Other headache syndrome  G44.89    5. Polyneuropathy, unspecified  G62.9        Her neurological examination previously was unremarkable except for some reduced reflexes in the extremities. Based upon her history and examination she most likely has a peripheral neuropathy. Her EMG with nerve conduction study of the legs was suggestive of a sensory neuropathy. Her lab work was unremarkable. She will be continued on tramadol as this provides benefit. [facility] She will increased her Tramadol toTID. She is off Elavil for sleep and neuropathy. Did PT for her back pain. Seeing Dr Lilia Corral for her back. Getting steroid injection. The patient indicates understanding of the management plan. She is to follow-up with me in approximately 6 month and call with any further problems. continue care as treatment is helping. Pain medicine keeping things stable. Plan    No orders of the defined types were placed in this encounter. Orders Placed This Encounter   Medications    traMADol (ULTRAM) 50 MG tablet     Sig: Take 1 tablet by mouth every 8 hours as needed for Pain for up to 30 days. Take lowest dose possible to manage pain ok to fill today April 22     Dispense:  90 tablet     Refill:  5     Reduce doses taken as pain becomes manageable       Return in about 6 months (around 10/22/2022). EMR Dragon/transcription disclaimer:Significant part of this  encounter note is electronic transcription/translation of spoken language to printed text. The electronic translation of spoken language may be erroneous, or at times, nonsensical words or phrases may be inadvertently transcribed.  Although I have reviewed the note for such errors, some may still exist.

## 2022-05-02 RX ORDER — LISINOPRIL 10 MG/1
TABLET ORAL
Qty: 90 TABLET | Refills: 3 | Status: SHIPPED | OUTPATIENT
Start: 2022-05-02 | End: 2022-07-28 | Stop reason: SDUPTHER

## 2022-06-06 RX ORDER — PSEUDOEPHEDRINE HYDROCHLORIDE 30 MG/1
30 TABLET ORAL EVERY 6 HOURS PRN
Qty: 28 TABLET | Refills: 1 | Status: SHIPPED | OUTPATIENT
Start: 2022-06-06 | End: 2022-09-24

## 2022-07-29 RX ORDER — LISINOPRIL 10 MG/1
TABLET ORAL
Qty: 90 TABLET | Refills: 3 | Status: SHIPPED | OUTPATIENT
Start: 2022-07-29 | End: 2022-10-25 | Stop reason: SDUPTHER

## 2022-09-13 SDOH — HEALTH STABILITY: PHYSICAL HEALTH: ON AVERAGE, HOW MANY MINUTES DO YOU ENGAGE IN EXERCISE AT THIS LEVEL?: 0 MIN

## 2022-09-13 SDOH — HEALTH STABILITY: PHYSICAL HEALTH: ON AVERAGE, HOW MANY DAYS PER WEEK DO YOU ENGAGE IN MODERATE TO STRENUOUS EXERCISE (LIKE A BRISK WALK)?: 0 DAYS

## 2022-09-13 ASSESSMENT — PATIENT HEALTH QUESTIONNAIRE - PHQ9
1. LITTLE INTEREST OR PLEASURE IN DOING THINGS: 0
SUM OF ALL RESPONSES TO PHQ QUESTIONS 1-9: 0
2. FEELING DOWN, DEPRESSED OR HOPELESS: 0
SUM OF ALL RESPONSES TO PHQ9 QUESTIONS 1 & 2: 0
SUM OF ALL RESPONSES TO PHQ QUESTIONS 1-9: 0

## 2022-09-13 ASSESSMENT — LIFESTYLE VARIABLES
HOW OFTEN DO YOU HAVE A DRINK CONTAINING ALCOHOL: NEVER
HOW MANY STANDARD DRINKS CONTAINING ALCOHOL DO YOU HAVE ON A TYPICAL DAY: PATIENT DOES NOT DRINK
HOW OFTEN DO YOU HAVE SIX OR MORE DRINKS ON ONE OCCASION: 1
HOW OFTEN DO YOU HAVE A DRINK CONTAINING ALCOHOL: 1
HOW MANY STANDARD DRINKS CONTAINING ALCOHOL DO YOU HAVE ON A TYPICAL DAY: 0

## 2022-09-20 ENCOUNTER — OFFICE VISIT (OUTPATIENT)
Dept: PRIMARY CARE CLINIC | Age: 77
End: 2022-09-20
Payer: MEDICARE

## 2022-09-20 VITALS
TEMPERATURE: 97.1 F | DIASTOLIC BLOOD PRESSURE: 80 MMHG | OXYGEN SATURATION: 96 % | HEIGHT: 69 IN | BODY MASS INDEX: 32.44 KG/M2 | HEART RATE: 65 BPM | SYSTOLIC BLOOD PRESSURE: 120 MMHG | WEIGHT: 219 LBS

## 2022-09-20 DIAGNOSIS — Z00.00 MEDICARE ANNUAL WELLNESS VISIT, SUBSEQUENT: Primary | ICD-10-CM

## 2022-09-20 DIAGNOSIS — E78.00 HYPERCHOLESTEROLEMIA: ICD-10-CM

## 2022-09-20 DIAGNOSIS — E03.9 ACQUIRED HYPOTHYROIDISM: ICD-10-CM

## 2022-09-20 DIAGNOSIS — Z12.31 SCREENING MAMMOGRAM, ENCOUNTER FOR: ICD-10-CM

## 2022-09-20 DIAGNOSIS — I10 ESSENTIAL HYPERTENSION: ICD-10-CM

## 2022-09-20 DIAGNOSIS — E55.9 VITAMIN D DEFICIENCY: ICD-10-CM

## 2022-09-20 LAB
ALBUMIN SERPL-MCNC: 4.2 G/DL (ref 3.5–5.2)
ALP BLD-CCNC: 82 U/L (ref 35–104)
ALT SERPL-CCNC: 11 U/L (ref 5–33)
ANION GAP SERPL CALCULATED.3IONS-SCNC: 9 MMOL/L (ref 7–19)
AST SERPL-CCNC: 15 U/L (ref 5–32)
BILIRUB SERPL-MCNC: 0.7 MG/DL (ref 0.2–1.2)
BUN BLDV-MCNC: 19 MG/DL (ref 8–23)
CALCIUM SERPL-MCNC: 9.3 MG/DL (ref 8.8–10.2)
CHLORIDE BLD-SCNC: 101 MMOL/L (ref 98–111)
CHOLESTEROL, TOTAL: 184 MG/DL (ref 160–199)
CO2: 27 MMOL/L (ref 22–29)
CREAT SERPL-MCNC: 0.9 MG/DL (ref 0.5–0.9)
GFR AFRICAN AMERICAN: >59
GFR NON-AFRICAN AMERICAN: >60
GLUCOSE BLD-MCNC: 103 MG/DL (ref 74–109)
HCT VFR BLD CALC: 41.9 % (ref 37–47)
HDLC SERPL-MCNC: 70 MG/DL (ref 65–121)
HEMOGLOBIN: 13.3 G/DL (ref 12–16)
LDL CHOLESTEROL CALCULATED: 98 MG/DL
MCH RBC QN AUTO: 31.1 PG (ref 27–31)
MCHC RBC AUTO-ENTMCNC: 31.7 G/DL (ref 33–37)
MCV RBC AUTO: 98.1 FL (ref 81–99)
PDW BLD-RTO: 12.7 % (ref 11.5–14.5)
PLATELET # BLD: 208 K/UL (ref 130–400)
PMV BLD AUTO: 10.7 FL (ref 9.4–12.3)
POTASSIUM SERPL-SCNC: 4.2 MMOL/L (ref 3.5–5)
RBC # BLD: 4.27 M/UL (ref 4.2–5.4)
SODIUM BLD-SCNC: 137 MMOL/L (ref 136–145)
TOTAL PROTEIN: 7.4 G/DL (ref 6.6–8.7)
TRIGL SERPL-MCNC: 78 MG/DL (ref 0–149)
TSH REFLEX FT4: 1.58 UIU/ML (ref 0.35–5.5)
VITAMIN D 25-HYDROXY: 42.3 NG/ML
WBC # BLD: 6.4 K/UL (ref 4.8–10.8)

## 2022-09-20 PROCEDURE — G8399 PT W/DXA RESULTS DOCUMENT: HCPCS | Performed by: FAMILY MEDICINE

## 2022-09-20 PROCEDURE — 1090F PRES/ABSN URINE INCON ASSESS: CPT | Performed by: FAMILY MEDICINE

## 2022-09-20 PROCEDURE — G0439 PPPS, SUBSEQ VISIT: HCPCS | Performed by: FAMILY MEDICINE

## 2022-09-20 PROCEDURE — 99214 OFFICE O/P EST MOD 30 MIN: CPT | Performed by: FAMILY MEDICINE

## 2022-09-20 PROCEDURE — G8417 CALC BMI ABV UP PARAM F/U: HCPCS | Performed by: FAMILY MEDICINE

## 2022-09-20 PROCEDURE — 1123F ACP DISCUSS/DSCN MKR DOCD: CPT | Performed by: FAMILY MEDICINE

## 2022-09-20 PROCEDURE — 1036F TOBACCO NON-USER: CPT | Performed by: FAMILY MEDICINE

## 2022-09-20 PROCEDURE — G8427 DOCREV CUR MEDS BY ELIG CLIN: HCPCS | Performed by: FAMILY MEDICINE

## 2022-09-20 RX ORDER — PREDNISONE 20 MG/1
20 TABLET ORAL DAILY
Qty: 7 TABLET | Refills: 0 | Status: SHIPPED | OUTPATIENT
Start: 2022-09-20 | End: 2022-09-27

## 2022-09-20 SDOH — ECONOMIC STABILITY: FOOD INSECURITY: WITHIN THE PAST 12 MONTHS, YOU WORRIED THAT YOUR FOOD WOULD RUN OUT BEFORE YOU GOT MONEY TO BUY MORE.: NEVER TRUE

## 2022-09-20 SDOH — ECONOMIC STABILITY: FOOD INSECURITY: WITHIN THE PAST 12 MONTHS, THE FOOD YOU BOUGHT JUST DIDN'T LAST AND YOU DIDN'T HAVE MONEY TO GET MORE.: NEVER TRUE

## 2022-09-20 ASSESSMENT — SOCIAL DETERMINANTS OF HEALTH (SDOH): HOW HARD IS IT FOR YOU TO PAY FOR THE VERY BASICS LIKE FOOD, HOUSING, MEDICAL CARE, AND HEATING?: NOT HARD AT ALL

## 2022-09-20 NOTE — PROGRESS NOTES
Medicare Annual Wellness Visit    Sadiq Sanchez is here for Medicare AWV (Fasting. Mammogram being set up for patient. No complaints. Sees Dr. Diamond Santamaria for pain management with back.  )    Assessment & Plan   Medicare annual wellness visit, subsequent  Screening mammogram, encounter for  -     [facility] DIGITAL SCREEN W OR WO CAD BILATERAL; Future  Vitamin D deficiency  Assessment & Plan:  Uncertain status until we get results of her blood work. She is currently taking 1000 IU a day of vitamin D. Orders:  -     Vitamin D 25 Hydroxy  Essential hypertension  Assessment & Plan:  Blood pressure controlled at 120/80. We will refill her lisinopril 10 mg 1 tablet once a day. Orders:  -     CBC  -     Comprehensive Metabolic Panel  Acquired hypothyroidism  Assessment & Plan:  Uncertain status until we get the results of her lab work. Currently on levothyroxine 50 mcg 1 tablet once a day. Orders:  -     TSH with Reflex to FT4  Hypercholesterolemia  Assessment & Plan: Total cholesterol was 214 last year. Hopefully will be better today. We did discuss diet and exercise. Uncertain status until we get results. Orders:  -     Lipid Panel    Recommendations for Preventive Services Due: see orders and patient instructions/AVS.    Her tramadol which is a controlled substances prescribed by Dr. Diamond Santamaria at pain management. He controls this. Recommended screening schedule for the next 5-10 years is provided to the patient in written form: see Patient Instructions/AVS.     Return in 1 year (on 9/20/2023) for Medicare Annual Wellness Visit in 1 year. Subjective   The following acute and/or chronic problems were also addressed today:    Patient's complete Health Risk Assessment and screening values have been reviewed and are found in Flowsheets. The following problems were reviewed today and where indicated follow up appointments were made and/or referrals ordered.     Positive Risk Factor Screenings with Interventions:             Opioid Risk: (Low risk score <55) Opioid risk score: 13    Patient is low risk for opioid use disorder or overdose. Last PDMP Nonnie Kocher as Reviewed:  Review User Review Instant Review Result   Reena Reveal 9/24/2022  3:16 PM Reviewed PDMP [1]     Last Controlled Substance Monitoring Documentation      [address] Office Visit from 9/20/2022 in [address] [facility]   Periodic Controlled Substance Monitoring Possible medication side effects, risk of tolerance/dependence & alternative treatments discussed., No signs of potential drug abuse or diversion identified. , Assessed functional status., Obtaining appropriate analgesic effect of treatment.  filed [facility]/2022 [facility] and ACP:  General  In general, how would you say your health is?: Good  In the past 7 days, have you experienced any of the following: New or Increased Pain, New or Increased Fatigue, Loneliness, Social Isolation, Stress or Anger?: No  Do you get the social and emotional support that you need?: Yes  Do you have a Living Will?: (!) No    Advance Directives       Power of [address] [facility] Will ACP-Advance Directive ACP-Power of     Not on File Not on File Not on File Not on File        General Health Risk Interventions:  No Living Will: Advance Care Planning addressed with patient today    Health Habits/Nutrition:  Physical Activity: Inactive    Days of Exercise per Week: 0 days    Minutes of Exercise per Session: 0 min     Have you lost any weight without trying in the past 3 months?: No  Body mass index: (!) 32.34  Have you seen the dentist within the past year?: (!) No  Health Habits/Nutrition Interventions:  Inadequate physical activity:  patient is not ready to increase his/her physical activity level at this time  Nutritional issues:  educational materials for healthy, well-balanced diet provided     Safety:  Do you have working smoke detectors?: (!) No  Do you have any tripping hazards - loose or unsecured carpets or rugs?: No  Do you have any tripping hazards - clutter in doorways, halls, or stairs?: No  Do you have either shower bars, grab bars, non-slip mats or non-slip surfaces in your shower or bathtub?: Yes  Do all of your stairways have a railing or banister?: Yes  Do you always fasten your seatbelt when you are in a car?: Yes  Safety Interventions:  Home safety tips provided           Objective   Vitals:    09/20/22 0939   BP: 120/80   Pulse: 65   Temp: 97.1 °F (36.2 °C)   SpO2: 96%   Weight: 219 lb (99.3 kg)   Height: 5' 9\" (1.753 m)      Body mass index is 32.34 kg/m². General Appearance: alert and oriented to person, place and time, well developed and well- nourished, in no acute distress. Slightly overweight. Skin: warm and dry, no rash or erythema  Head: normocephalic and atraumatic  Eyes: pupils equal, round, and reactive to light, extraocular eye movements intact, conjunctivae normal  ENT: tympanic membrane, external ear and ear canal normal bilaterally, nose without deformity, nasal mucosa and turbinates normal without polyps  Neck: supple and non-tender without mass, no thyromegaly or thyroid nodules, no cervical lymphadenopathy  Pulmonary/Chest: clear to auscultation bilaterally- no wheezes, rales or rhonchi, normal air movement, no respiratory distress  Cardiovascular: normal rate, regular rhythm, normal S1 and S2, no murmurs, rubs, clicks, or gallops, distal pulses intact, no carotid bruits  Abdomen: soft, non-tender, non-distended, normal bowel sounds, no masses or organomegaly  Breast: appear normal, no suspicious masses, no skin or nipple changes or axillary nodes  Pelvic not examined.   Extremities: no cyanosis, clubbing or edema  Musculoskeletal: normal range of motion, no joint swelling, deformity or tenderness  Neurologic: reflexes normal and symmetric, no cranial nerve deficit, gait, coordination and speech normal       Allergies   Allergen Reactions    Neurontin [Gabapentin] Nausea Only     Prior to Visit Medications    Medication Sig Taking? Authorizing Provider   predniSONE (DELTASONE) 20 MG tablet Take 1 tablet by mouth daily for 7 days Yes Alfred Chahal MD   lisinopril (PRINIVIL;ZESTRIL) 10 MG tablet 1 tablet by mouth once a day for high blood pressure Yes DA Silvestre NP   levothyroxine (SYNTHROID) 50 MCG tablet TAKE 1 TABLET DAILY FOR LOW THYROID Yes Alfred Chahal MD   traMADol (ULTRAM) 50 MG tablet Take 50 mg by mouth every 6 hours as needed for Pain.  Yes Historical Provider, MD   pantoprazole (PROTONIX) 40 MG tablet TAKE 1 TABLET DAILY FOR STOMACH Yes Alfred Chahal MD   Vitamin D (CHOLECALCIFEROL) 1000 UNITS CAPS capsule Take 1,000 Units by mouth daily Yes Historical Provider, MD Felix (Including outside providers/suppliers regularly involved in providing care):   Patient Care Team:  Alfred Chahal MD as PCP - General (Family Medicine)  Alfred Chahal MD as PCP - [facility] Empaneled Provider  Lori Ivey MD as Neurologist (Neurology)  David Ward MD as Consulting Physician (Neurosurgery)     Reviewed and updated this visit:  Tobacco  Allergies  Meds  Problems  Med Hx  Surg Hx  Soc Hx  Fam Hx

## 2022-09-20 NOTE — PATIENT INSTRUCTIONS
Wt Readings from Last 3 Encounters:   09/20/22 219 lb (99.3 kg)   04/22/22 233 lb (105.7 kg)   03/23/22 233 lb (105.7 kg)    We are committed to providing you with the best care possible. In order to help us achieve these goals please remember to bring all medications, herbal products, and over the counter supplements with you to each visit. If your provider has ordered testing for you, please be sure to follow up with our office if you have not received results within 7 days after the testing took place. *If you receive a survey after visiting one of our offices, please take time to share your experience concerning your physician office visit. These surveys are confidential and no health information about you is shared. We are eager to improve for you and we are counting on your feedback to help make that happen. As you get older the ovaries really don't \"fall out\". They do get smaller in size but they are still present in the body and unfortunately, sometimes they can develop ovarian cancer. Even though we do a pelvic exam where we try to feel the ovaries and the uterus, the ovaries are very small after menopause and can be easily missed. Therefore, even if the doctor told you that they didn't feel anything, if you develop a change in bowel or bladder function, develop abdominal pain or bloating or develop other unusual symptoms, please call your doctor. Personalized Preventive Plan for Dandy Gan - 9/20/2022  Medicare offers a range of preventive health benefits. Some of the tests and screenings are paid in full while other may be subject to a deductible, co-insurance, and/or copay. Some of these benefits include a comprehensive review of your medical history including lifestyle, illnesses that may run in your family, and various assessments and screenings as appropriate.     After reviewing your medical record and screening and assessments performed today your provider may have ordered immunizations, labs, imaging, and/or referrals for you. A list of these orders (if applicable) as well as your Preventive Care list are included within your After Visit Summary for your review. Other Preventive Recommendations:    A preventive eye exam performed by an eye specialist is recommended every 1-2 years to screen for glaucoma; cataracts, macular degeneration, and other eye disorders. A preventive dental visit is recommended every 6 months. Try to get at least 150 minutes of exercise per week or 10,000 steps per day on a pedometer . Order or download the FREE \"Exercise & Physical Activity: Your Everyday Guide\" from [employer]. Call 2-489.386.4935 or search [employer] online. You need 7134-7353 mg of calcium and 1973-1567 IU of vitamin D per day. It is possible to meet your calcium requirement with diet alone, but a vitamin D supplement is usually necessary to meet this goal.  When exposed to the sun, use a sunscreen that protects against both UVA and UVB radiation with an SPF of 30 or greater. Reapply every 2 to 3 hours or after sweating, drying off with a towel, or swimming. Always wear a seat belt when traveling in a car. Always wear a helmet when riding a bicycle or motorcycle. Advance Care Planning: Care Instructions  Your Care Instructions     It can be hard to live with an illness that cannot be cured. But if your health is getting worse, you may want to make decisions about end-of-life care. Planning for the end of your life does not mean that you are giving up. It is a way to make sure that your wishes are met. Clearly stating your wishes can make it easier for your loved ones. Making plans while you are still able may also ease your mind and make your final days less stressful and more meaningful. Follow-up care is a key part of your treatment and safety.  Be sure to make and go to all appointments, and call your doctor if you are having problems. It's also a good idea to know your test results and keep a list of the medicines you take. What can you do to plan for the end of life? You can bring these issues up with your doctor. You do not need to wait until your doctor starts the conversation. You might start with, \"What makes life worth living for me is. Remi Cordero .\" And then follow it with, \"I would not be willing to live with . Remi Cordero Remi Cordero \" When you complete this sentence it helps your doctor understand your wishes. Talk openly and honestly with your doctor. This is the best way to understand the decisions you will need to make as your health changes. Know that you can always change your mind. Ask your doctor about commonly used life-support measures. These include tube feedings, breathing machines, and fluids given through a vein (IV). Understanding these treatments will help you decide whether you want them. You may choose to have these life-supporting treatments for a limited time. This allows a trial period to see whether they will help you. You may also decide that you want your doctor to take only certain measures to keep you alive. It may help to think about the big picture, like what makes life worth living for you or what your values and goals are. Talk to your doctor about how long you are likely to live. Your doctor may be able to give you an idea of what usually happens with your specific illness. Think about preparing papers that state your wishes. These papers are called advance directives. If you do this early and review them often, there will not be any confusion about what you want. You can change your instructions at any time. Which papers should you prepare? Advance directives are legal papers that tell doctors how you want to be cared for at the end of your life. You do not need a  to write these papers. Ask your doctor or your state health department for information on how to write your advance directives.  They may have the forms for each of these types of papers. Make sure your doctor has a copy of these on file, and give a copy to a family member or close friend. Consider a do-not-resuscitate order (DNR). This order asks that no extra treatments be done if your heart stops or you stop breathing. Extra treatments may include cardiopulmonary resuscitation (CPR), electrical shock to restart your heart, or a machine to breathe for you. If you decide to have a DNR order, ask your doctor to explain and write it. Place the order in your home where everyone can easily see it. Consider a living will. A living will explains your wishes about life support and other treatments at the end of your life if you become unable to speak for yourself. Living odell tell doctors to use or not use treatments that would keep you alive. You must have one or two witnesses or a notary present when you sign this form. A living will may be called something else in your state. Consider a medical power of . This form allows you to name a person to make decisions about your care if you are not able to. Most people ask a close friend or family member. Talk to this person about the kinds of treatments you want and those that you do not want. Make sure this person understands your wishes. A medical power of  may be called something else in your state. These legal papers are simple to change. Tell your doctor what you want to change, and ask him or her to make a note in your medical file. Give your family updated copies of the papers. Where can you learn more? Go to [email or web address] org and sign in to your [employer] account. Enter P184 in the [facility] box to learn more about \"Advance Care Planning: Care Instructions. \"     If you do not have an account, please click on the \"Sign Up Now\" link. Current as of: October 6, 2021               Content Version: 13.4  © 5816-4343 Healthwise, [facility].    Care be helpful if your state's laws are unclear. It can also help if your health history is complicated or your family can't agree on what should be in your living will. You can change your living will at any time. Some people find that their wishes about end-of-life care change as their health changes. If you make big changes to your living will, complete a new form. If you move to another state, make sure that your living will is legal in the state where you now live. In most cases, doctors will respect your wishes even if you have a form from a different state. You might use a universal form that has been approved by many states. This kind of form can sometimes be filled out and stored online. Your digital copy will then be available wherever you have a connection to the internet. The doctors and nurses who need to treat you can find it right away. Your state may offer an online registry. This is another place where you can store your living will online. It's a good idea to get your living will notarized. This means using a person called a [employer] to watch two people sign, or witness, your living will. What should you know when you create a living will? Here are some questions to ask yourself as you make your living will. Do you know enough about life support methods that might be used? If not, talk to your doctor so you know what might be done if you can't breathe on your own, your heart stops, or you can't swallow. What things would you still want to be able to do after you receive life-support methods? Would you want to be able to walk? To speak? To eat on your own? To live without the help of machines? Do you want certain Presybeterian practices performed if you become very ill? If you have a choice, where do you want to be cared for? In your home? At a hospital or nursing home? If you have a choice at the end of your life, where would you prefer to die? At home? In a hospital or nursing home? Somewhere else? Would you prefer to be buried or cremated? Do you want your organs to be donated after you die? What should you do with your living will? Make sure that your family members and your health care agent have copies of your living will (also called a declaration). Give your doctor a copy of your living will. Ask to have it kept as part of your medical record. If you have more than one doctor, make sure that each one has a copy. Put a copy of your living will where it can be easily found. For example, some people may put a copy on their refrigerator door. If you are using a digital copy, be sure your doctor, family members, and health care agent know how to find and access it. Where can you learn more? Go to https://LearnZillionpeStartpack.[employer]Gent [employer]. org and sign in to your [employer] account. Enter G119 in the [employer] box to learn more about \"Learning About Living Perroy. \"     If you do not have an account, please click on the \"Sign Up Now\" link. Current as of: June 16, 2022               Content Version: 13.4  © 5229-1070 [employer]. Care instructions adapted under license by [facility] ([facility]). If you have questions about a medical condition or this instruction, always ask your healthcare professional. [address] any warranty or liability for your use of this information.

## 2022-09-24 PROBLEM — E78.00 HYPERCHOLESTEROLEMIA: Status: ACTIVE | Noted: 2022-09-24

## 2022-09-24 NOTE — ASSESSMENT & PLAN NOTE
Total cholesterol was 214 last year. Hopefully will be better today. We did discuss diet and exercise. Uncertain status until we get results.

## 2022-09-24 NOTE — ASSESSMENT & PLAN NOTE
Uncertain status until we get results of her blood work.   She is currently taking 1000 IU a day of vitamin D.

## 2022-09-24 NOTE — ASSESSMENT & PLAN NOTE
Uncertain status until we get the results of her lab work. Currently on levothyroxine 50 mcg 1 tablet once a day.

## 2022-10-03 ENCOUNTER — TELEPHONE (OUTPATIENT)
Dept: NEUROLOGY | Age: 77
End: 2022-10-03

## 2022-10-03 NOTE — TELEPHONE ENCOUNTER
Called and spoke with pateint to let her know that her appointment with Dr. Elida Cohen for 10-21-22 had to be rescheduled due to provider is out of the office. Patient is aware of when I have patient appointment rescheduled too.

## 2022-10-18 DIAGNOSIS — G62.9 POLYNEUROPATHY, UNSPECIFIED: ICD-10-CM

## 2022-10-18 DIAGNOSIS — G62.9 NEUROPATHY: ICD-10-CM

## 2022-10-18 RX ORDER — TRAMADOL HYDROCHLORIDE 50 MG/1
TABLET ORAL
Qty: 90 TABLET | Refills: 5 | OUTPATIENT
Start: 2022-10-23 | End: 2022-11-22

## 2022-10-18 RX ORDER — TRAMADOL HYDROCHLORIDE 50 MG/1
50 TABLET ORAL EVERY 8 HOURS PRN
Qty: 90 TABLET | Refills: 5 | Status: SHIPPED | OUTPATIENT
Start: 2022-10-23 | End: 2022-11-22

## 2022-10-18 NOTE — TELEPHONE ENCOUNTER
Requested Prescriptions     Pending Prescriptions Disp Refills    traMADol (ULTRAM) 50 MG tablet 90 tablet 5     Sig: Take 1 tablet by mouth every 8 hours as needed for Pain for up to 30 days.  Take lowest dose possible to manage pain ok to fill today April 22       Last Office Visit:  4/22/2022  Next Office Visit:  11/18/2022  Last Medication Refill:  4/22/22 with 5 RF  Raleigh Krause up to date:  10/18/22     *RX updated to reflect   10/23/22  fill date*     José kelley

## 2022-10-18 NOTE — TELEPHONE ENCOUNTER
Requested Prescriptions     Pending Prescriptions Disp Refills    traMADol (ULTRAM) 50 MG tablet [Pharmacy Med Name: tramadol 50 mg tablet] 90 tablet 5     Sig: TAKE ONE TABLET BY MOUTH EVERY 8 HOURS AS NEEDED FOR PAIN       Last Office Visit:  4/22/2022  Next Office Visit:  11/18/2022  Last Medication Refill:  4/22/22 with Sheron Berrios up to date:  10/18/22    *RX updated to reflect   10/23/22  fill date*    José kelley

## 2022-10-19 DIAGNOSIS — Z12.31 SCREENING MAMMOGRAM, ENCOUNTER FOR: ICD-10-CM

## 2022-10-25 RX ORDER — PANTOPRAZOLE SODIUM 40 MG/1
TABLET, DELAYED RELEASE ORAL
Qty: 90 TABLET | Refills: 3 | Status: SHIPPED | OUTPATIENT
Start: 2022-10-25

## 2022-10-25 RX ORDER — LEVOTHYROXINE SODIUM 0.05 MG/1
TABLET ORAL
Qty: 90 TABLET | Refills: 3 | Status: SHIPPED | OUTPATIENT
Start: 2022-10-25

## 2022-10-25 RX ORDER — LISINOPRIL 10 MG/1
TABLET ORAL
Qty: 90 TABLET | Refills: 3 | Status: SHIPPED | OUTPATIENT
Start: 2022-10-25

## 2022-11-18 ENCOUNTER — OFFICE VISIT (OUTPATIENT)
Dept: NEUROLOGY | Age: 77
End: 2022-11-18
Payer: MEDICARE

## 2022-11-18 VITALS
HEIGHT: 69 IN | OXYGEN SATURATION: 98 % | BODY MASS INDEX: 32.44 KG/M2 | HEART RATE: 78 BPM | WEIGHT: 219 LBS | SYSTOLIC BLOOD PRESSURE: 122 MMHG | DIASTOLIC BLOOD PRESSURE: 74 MMHG

## 2022-11-18 DIAGNOSIS — G62.9 NEUROPATHY: Primary | ICD-10-CM

## 2022-11-18 DIAGNOSIS — E53.9 BURNING FEET SYNDROME: ICD-10-CM

## 2022-11-18 DIAGNOSIS — M54.50 LOW BACK PAIN, UNSPECIFIED BACK PAIN LATERALITY, UNSPECIFIED CHRONICITY, UNSPECIFIED WHETHER SCIATICA PRESENT: ICD-10-CM

## 2022-11-18 DIAGNOSIS — G44.89 OTHER HEADACHE SYNDROME: ICD-10-CM

## 2022-11-18 DIAGNOSIS — M54.2 PAIN, NECK: ICD-10-CM

## 2022-11-18 DIAGNOSIS — G62.9 POLYNEUROPATHY, UNSPECIFIED: ICD-10-CM

## 2022-11-18 PROCEDURE — G8484 FLU IMMUNIZE NO ADMIN: HCPCS | Performed by: PSYCHIATRY & NEUROLOGY

## 2022-11-18 PROCEDURE — 1123F ACP DISCUSS/DSCN MKR DOCD: CPT | Performed by: PSYCHIATRY & NEUROLOGY

## 2022-11-18 PROCEDURE — G8399 PT W/DXA RESULTS DOCUMENT: HCPCS | Performed by: PSYCHIATRY & NEUROLOGY

## 2022-11-18 PROCEDURE — 1036F TOBACCO NON-USER: CPT | Performed by: PSYCHIATRY & NEUROLOGY

## 2022-11-18 PROCEDURE — G8417 CALC BMI ABV UP PARAM F/U: HCPCS | Performed by: PSYCHIATRY & NEUROLOGY

## 2022-11-18 PROCEDURE — G8427 DOCREV CUR MEDS BY ELIG CLIN: HCPCS | Performed by: PSYCHIATRY & NEUROLOGY

## 2022-11-18 PROCEDURE — 3074F SYST BP LT 130 MM HG: CPT | Performed by: PSYCHIATRY & NEUROLOGY

## 2022-11-18 PROCEDURE — 99214 OFFICE O/P EST MOD 30 MIN: CPT | Performed by: PSYCHIATRY & NEUROLOGY

## 2022-11-18 PROCEDURE — 1090F PRES/ABSN URINE INCON ASSESS: CPT | Performed by: PSYCHIATRY & NEUROLOGY

## 2022-11-18 PROCEDURE — 3078F DIAST BP <80 MM HG: CPT | Performed by: PSYCHIATRY & NEUROLOGY

## 2022-11-18 RX ORDER — VITAMIN B COMPLEX
1 CAPSULE ORAL DAILY
COMMUNITY

## 2022-11-18 RX ORDER — HYDROCODONE BITARTRATE AND ACETAMINOPHEN 7.5; 325 MG/1; MG/1
1 TABLET ORAL EVERY 6 HOURS PRN
Qty: 20 TABLET | Refills: 0 | Status: SHIPPED | OUTPATIENT
Start: 2022-11-18 | End: 2022-12-02

## 2022-11-18 NOTE — PROGRESS NOTES
Chief Complaint   Patient presents with    6 Month Follow-Up     Neuropathy          Humza Babin is a 76y.o. year old female who is seen for evaluation of burning feet. This has been present for around 2015 or so. It is typically worse at night when she lays down. She sometimes feels that the nerve endings to her body are not normal. She does have some chronic neck pain issues and some occasional low back pain issues. She denies any weakness. She indicates her mother and diabetes and neuropathy. She denies any exposure to chemicals, toxins, or pesticides. She has tried Cymbalta or amitriptyline in the past. Tramadol does help. Stretching helps. Did not tolerate trileptal. Going to chiropractor helps neck and back. Gabapentin made her sick. Tramadol still helps only one at night. .Cut back on sugar and going to chiropractor. . Itching flared up and now better. Not taking Klonopin. Tramadol at night helps. Wakes up quite a bit at night. March 6 th , 2021 left leg gave out and fell. Cracked right arm. Up to 3 Tramadol now and this helps her back as well. Seeing Dr Geraldo Ormond for low back pain. Going to pain management. Getting shot in back with Dr Onesimo Paredes.      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    Pain, neck 03/21/2018    Other headache syndrome 02/06/2019    Polyneuropathy, unspecified 04/22/2022    Hypercholesterolemia 09/24/2022     Resolved Ambulatory Problems     Diagnosis Date Noted    No Resolved Ambulatory Problems     Past Medical History:   Diagnosis Date    Back pain     Hypertension     Hypothyroidism        Past Surgical History:   Procedure Laterality Date    CHOLECYSTECTOMY      COLONOSCOPY      1-09    TUBAL LIGATION         Family History   Problem Relation Age of Onset    Diabetes Mother     High Blood Pressure Mother     Alzheimer's Disease Mother     Alzheimer's Disease Father     Cancer Sister         colon       Allergies   Allergen Reactions    Neurontin [Gabapentin] Nausea Only       Social History     Socioeconomic History    Marital status:      Spouse name: Not on file    Number of children: 1    Years of education: Not on file    Highest education level: Not on file   Occupational History    Occupation: retired    Tobacco Use    Smoking status: Never    Smokeless tobacco: Never   Vaping Use    Vaping Use: Never used   Substance and Sexual Activity    Alcohol use: No    Drug use: No    Sexual activity: Yes     Partners: Male   Other Topics Concern    Not on file   Social History Narrative    Not on file     Social Determinants of Health     Financial Resource Strain: Low Risk     Difficulty of Paying Living Expenses: Not hard at all   Food Insecurity: No Food Insecurity    Worried About Running Out of Food in the Last Year: Never true    Ran Out of Food in the Last Year: Never true   Transportation Needs: Not on file   Physical Activity: Inactive    Days of Exercise per Week: 0 days    Minutes of Exercise per Session: 0 min   Stress: Not on file   Social Connections: Not on file   Intimate Partner Violence: Not on file   Housing Stability: Not on file     Review of Systems     Constitutional - No fever or chills. No diaphoresis or significant fatigue. HENT -  No tinnitus or significant hearing loss. Eyes - no sudden vision change or eye pain  Respiratory - no significant shortness of breath or cough  Cardiovascular - no chest pain No palpitations or significant leg swelling  Gastrointestinal - no abdominal swelling or pain. Genitourinary - No difficulty urinating, dysuria  Musculoskeletal - no back pain or myalgia. Skin - no color change or rash  Neurologic - No seizures.   No lateralizing weakness. Hematologic - no easy bruising or excessive bleeding. Psychiatric - no severe anxiety or nervousness. All other review of systems are negative. Current Outpatient Medications   Medication Sig Dispense Refill    b complex vitamins capsule Take 1 capsule by mouth daily      HYDROcodone-acetaminophen (NORCO) 7.5-325 MG per tablet Take 1 tablet by mouth every 6 hours as needed for Pain for up to 14 days. Intended supply: 5 days. Take lowest dose possible to manage pain 20 tablet 0    levothyroxine (SYNTHROID) 50 MCG tablet TAKE 1 TABLET DAILY FOR LOW THYROID 90 tablet 3    pantoprazole (PROTONIX) 40 MG tablet TAKE 1 TABLET DAILY FOR STOMACH 90 tablet 3    lisinopril (PRINIVIL;ZESTRIL) 10 MG tablet 1 tablet by mouth once a day for high blood pressure 90 tablet 3    traMADol (ULTRAM) 50 MG tablet Take 1 tablet by mouth every 8 hours as needed for Pain for up to 30 days. Take lowest dose possible to manage pain ok to fill today April 22 90 tablet 5    Vitamin D (CHOLECALCIFEROL) 1000 UNITS CAPS capsule Take 1,000 Units by mouth daily       No current facility-administered medications for this visit. /74   Pulse 78   Ht 5' 9\" (1.753 m)   Wt 219 lb (99.3 kg)   SpO2 98%   BMI 32.34 kg/m²     Constitutional - well developed, well nourished. Eyes - conjunctiva normal.    Ear, nose, throat - No scars, masses, or lesions over external nose or ears, no atrophy of tongue  Neck-symmetric, no masses noted, no jugular vein distension  Respiration- chest wall appears symmetric, good expansion,   normal effort without use of accessory muscles  Musculoskeletal - no significant wasting of muscles noted, no bony deformities  Extremities-no clubbing, cyanosis or edema  Skin - warm, dry, and intact. No rash, erythema, or pallor.   Psychiatric - mood, affect, and behavior appear normal.      Neurological exam  Awake, alert, fluent oriented  appropriate affect  Attention and concentration appear appropriate  Recent and remote memory appears unremarkable  Speech normal without dysarthria  No clear issues with language of fund of knowledge    Cranial Nerve Exam     CN III, IV,VI-EOMI, No nystagmus, conjugate eye movements, no ptosis    CN VII-no facial assymetry    Motor Exam  antigravity throughout upper and lower extremities bilaterally, no cogwheeling, normal tone          Gait  Normal base and speed  No ataxia    Coordination  Finger to nose-unremarkable    Lab Results   Component Value Date    QMSXDUEG62 534 03/21/2018     Lab Results   Component Value Date    WBC 6.4 09/20/2022    HGB 13.3 09/20/2022    HCT 41.9 09/20/2022    MCV 98.1 09/20/2022     09/20/2022     Lab Results   Component Value Date     09/20/2022    K 4.2 09/20/2022     09/20/2022    CO2 27 09/20/2022    BUN 19 09/20/2022    CREATININE 0.9 09/20/2022    GLUCOSE 103 09/20/2022    CALCIUM 9.3 09/20/2022    PROT 7.4 09/20/2022    LABALBU 4.2 09/20/2022    BILITOT 0.7 09/20/2022    ALKPHOS 82 09/20/2022    AST 15 09/20/2022    ALT 11 09/20/2022    LABGLOM >60 09/20/2022    GFRAA >59 09/20/2022    GLOB 3.1 03/30/2017       Impression: The findings of this study are 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           ___________________________  Francois Manuel M.D. Board certified in Neurology  Board certified in Clinical Neurophysiology  Fellowship trained in [facility] [employer]    1. Neuropathy  G62.9       2. Polyneuropathy, unspecified  G62.9       3. Burning feet syndrome  E53.9       4. Pain, neck  M54.2       5. Other headache syndrome  G44.89       6. Low back pain, unspecified back pain laterality, unspecified chronicity, unspecified whether sciatica present  M54.50 HYDROcodone-acetaminophen (NORCO) 7.5-325 MG per tablet            Her neurological examination previously was unremarkable except for some reduced reflexes in the extremities.  Based upon her history and examination she most likely has a peripheral neuropathy. Her EMG with nerve conduction study of the legs was suggestive of a sensory neuropathy. Her lab work was unremarkable. She will be continued on tramadol as this provides benefit. Lyndon Mendes She will increased her Tramadol toTID. She is off Elavil for sleep and neuropathy. Did PT for her back pain. Seeing Dr Will Ruano for her back. Getting steroid injection. She was given a few pills of Norco to see if this works better. The patient indicates understanding of the management plan. She is to follow-up with me in approximately 3 month and call with any further problems. continue care as treatment is helping. Continue current management as things stable. Plan    No orders of the defined types were placed in this encounter. Orders Placed This Encounter   Medications    HYDROcodone-acetaminophen (NORCO) 7.5-325 MG per tablet     Sig: Take 1 tablet by mouth every 6 hours as needed for Pain for up to 14 days. Intended supply: 5 days. Take lowest dose possible to manage pain     Dispense:  20 tablet     Refill:  0     Reduce doses taken as pain becomes manageable         Return in about 3 months (around 2/18/2023). EMR Dragon/transcription disclaimer:Significant part of this  encounter note is electronic transcription/translation of spoken language to printed text. The electronic translation of spoken language may be erroneous, or at times, nonsensical words or phrases may be inadvertently transcribed.  Although I have reviewed the note for such errors, some may still exist.

## 2023-02-13 DIAGNOSIS — G62.9 NEUROPATHY: ICD-10-CM

## 2023-02-13 RX ORDER — TRAMADOL HYDROCHLORIDE 50 MG/1
50 TABLET ORAL EVERY 6 HOURS PRN
Qty: 120 TABLET | Refills: 5 | Status: SHIPPED | OUTPATIENT
Start: 2023-02-13 | End: 2023-03-15

## 2023-02-22 ENCOUNTER — OFFICE VISIT (OUTPATIENT)
Dept: NEUROLOGY | Age: 78
End: 2023-02-22
Payer: MEDICARE

## 2023-02-22 VITALS
SYSTOLIC BLOOD PRESSURE: 124 MMHG | BODY MASS INDEX: 32.14 KG/M2 | DIASTOLIC BLOOD PRESSURE: 78 MMHG | HEIGHT: 69 IN | HEART RATE: 64 BPM | WEIGHT: 217 LBS

## 2023-02-22 DIAGNOSIS — G62.9 NEUROPATHY: Primary | ICD-10-CM

## 2023-02-22 DIAGNOSIS — M54.2 PAIN, NECK: ICD-10-CM

## 2023-02-22 DIAGNOSIS — E53.9 BURNING FEET SYNDROME: ICD-10-CM

## 2023-02-22 DIAGNOSIS — G44.89 OTHER HEADACHE SYNDROME: ICD-10-CM

## 2023-02-22 DIAGNOSIS — M54.50 LOW BACK PAIN, UNSPECIFIED BACK PAIN LATERALITY, UNSPECIFIED CHRONICITY, UNSPECIFIED WHETHER SCIATICA PRESENT: ICD-10-CM

## 2023-02-22 DIAGNOSIS — G62.9 POLYNEUROPATHY, UNSPECIFIED: ICD-10-CM

## 2023-02-22 PROCEDURE — 3074F SYST BP LT 130 MM HG: CPT | Performed by: PSYCHIATRY & NEUROLOGY

## 2023-02-22 PROCEDURE — 1036F TOBACCO NON-USER: CPT | Performed by: PSYCHIATRY & NEUROLOGY

## 2023-02-22 PROCEDURE — G8484 FLU IMMUNIZE NO ADMIN: HCPCS | Performed by: PSYCHIATRY & NEUROLOGY

## 2023-02-22 PROCEDURE — G8417 CALC BMI ABV UP PARAM F/U: HCPCS | Performed by: PSYCHIATRY & NEUROLOGY

## 2023-02-22 PROCEDURE — G8427 DOCREV CUR MEDS BY ELIG CLIN: HCPCS | Performed by: PSYCHIATRY & NEUROLOGY

## 2023-02-22 PROCEDURE — G8399 PT W/DXA RESULTS DOCUMENT: HCPCS | Performed by: PSYCHIATRY & NEUROLOGY

## 2023-02-22 PROCEDURE — 1090F PRES/ABSN URINE INCON ASSESS: CPT | Performed by: PSYCHIATRY & NEUROLOGY

## 2023-02-22 PROCEDURE — 1123F ACP DISCUSS/DSCN MKR DOCD: CPT | Performed by: PSYCHIATRY & NEUROLOGY

## 2023-02-22 PROCEDURE — 99214 OFFICE O/P EST MOD 30 MIN: CPT | Performed by: PSYCHIATRY & NEUROLOGY

## 2023-02-22 PROCEDURE — 3078F DIAST BP <80 MM HG: CPT | Performed by: PSYCHIATRY & NEUROLOGY

## 2023-02-22 NOTE — PROGRESS NOTES
Chief Complaint   Patient presents with    Follow-up       Conner Soto is a 76y.o. year old female who is seen for evaluation of burning feet. This has been present for around 2015 or so. It is typically worse at night when she lays down. She sometimes feels that the nerve endings to her body are not normal. She does have some chronic neck pain issues and some occasional low back pain issues. She denies any weakness. She indicates her mother and diabetes and neuropathy. She denies any exposure to chemicals, toxins, or pesticides. She has tried Cymbalta or amitriptyline in the past. Tramadol does help. Stretching helps. Did not tolerate trileptal. Going to chiropractor helps neck and back. Gabapentin made her sick. Tramadol still helps only one at night. .Cut back on sugar and going to chiropractor. . Itching flared up and now better. Not taking Klonopin. Tramadol at night helps. Wakes up quite a bit at night. March 6 th , 2021 left leg gave out and fell. Cracked right arm. Up to 3 Tramadol now and this helps her back as well. Seeing Dr Shara Sánchez for low back pain. Going to pain management. Getting shot in back with Dr Keena Gardner. Doing better with Tramadol 4 a day.     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    Pain, neck 03/21/2018    Other headache syndrome 02/06/2019    Polyneuropathy, unspecified 04/22/2022    Hypercholesterolemia 09/24/2022     Resolved Ambulatory Problems     Diagnosis Date Noted    No Resolved Ambulatory Problems     Past Medical History:   Diagnosis Date    Back pain     Hypertension     Hypothyroidism        Past Surgical History:   Procedure Laterality Date    CHOLECYSTECTOMY      COLONOSCOPY      1-09 TUBAL LIGATION         Family History   Problem Relation Age of Onset    Diabetes Mother     High Blood Pressure Mother     Alzheimer's Disease Mother     Alzheimer's Disease Father     Cancer Sister         colon       Allergies   Allergen Reactions    Neurontin [Gabapentin] Nausea Only       Social History     Socioeconomic History    Marital status:      Spouse name: Not on file    Number of children: 1    Years of education: Not on file    Highest education level: Not on file   Occupational History    Occupation: retired    Tobacco Use    Smoking status: Never    Smokeless tobacco: Never   Vaping Use    Vaping Use: Never used   Substance and Sexual Activity    Alcohol use: No    Drug use: No    Sexual activity: Yes     Partners: Male   Other Topics Concern    Not on file   Social History Narrative    Not on file     Social Determinants of Health     Financial Resource Strain: Low Risk     Difficulty of Paying Living Expenses: Not hard at all   Food Insecurity: No Food Insecurity    Worried About Running Out of Food in the Last Year: Never true    Ran Out of Food in the Last Year: Never true   Transportation Needs: Not on file   Physical Activity: Inactive    Days of Exercise per Week: 0 days    Minutes of Exercise per Session: 0 min   Stress: Not on file   Social Connections: Not on file   Intimate Partner Violence: Not on file   Housing Stability: Not on file     Review of Systems     Constitutional - No fever or chills. No diaphoresis or significant fatigue. HENT -  No tinnitus or significant hearing loss. Eyes - no sudden vision change or eye pain  Respiratory - no significant shortness of breath or cough  Cardiovascular - no chest pain No palpitations or significant leg swelling  Gastrointestinal - no abdominal swelling or pain. Genitourinary - No difficulty urinating, dysuria  Musculoskeletal - no back pain or myalgia. Skin - no color change or rash  Neurologic - No seizures.   No lateralizing weakness. Hematologic - no easy bruising or excessive bleeding. Psychiatric - no severe anxiety or nervousness. All other review of systems are negative. Current Outpatient Medications   Medication Sig Dispense Refill    traMADol (ULTRAM) 50 MG tablet Take 1 tablet by mouth every 6 hours as needed for Pain for up to 30 days. Take lowest dose possible to manage pain ok to fill today April 22 Max Daily Amount: 200 mg 120 tablet 5    b complex vitamins capsule Take 1 capsule by mouth daily      levothyroxine (SYNTHROID) 50 MCG tablet TAKE 1 TABLET DAILY FOR LOW THYROID 90 tablet 3    pantoprazole (PROTONIX) 40 MG tablet TAKE 1 TABLET DAILY FOR STOMACH 90 tablet 3    lisinopril (PRINIVIL;ZESTRIL) 10 MG tablet 1 tablet by mouth once a day for high blood pressure 90 tablet 3    Vitamin D (CHOLECALCIFEROL) 1000 UNITS CAPS capsule Take 1,000 Units by mouth daily       No current facility-administered medications for this visit. /78   Pulse 64   Ht 5' 9\" (1.753 m)   Wt 217 lb (98.4 kg)   BMI 32.05 kg/m²     Constitutional - well developed, well nourished. Eyes - conjunctiva normal.    Ear, nose, throat - No scars, masses, or lesions over external nose or ears, no atrophy of tongue  Neck-symmetric, no masses noted, no jugular vein distension  Respiration- chest wall appears symmetric, good expansion,   normal effort without use of accessory muscles  Musculoskeletal - no significant wasting of muscles noted, no bony deformities  Extremities-no clubbing, cyanosis or edema  Skin - warm, dry, and intact. No rash, erythema, or pallor.   Psychiatric - mood, affect, and behavior appear normal.      Neurological exam  Awake, alert, fluent oriented  appropriate affect  Attention and concentration appear appropriate  Recent and remote memory appears unremarkable  Speech normal without dysarthria  No clear issues with language of fund of knowledge    Cranial Nerve Exam     CN III, IV,VI-EOMI, No nystagmus, conjugate eye movements, no ptosis    CN VII-no facial assymetry    Motor Exam  antigravity throughout upper and lower extremities bilaterally, no cogwheeling, normal tone          Gait  Normal base and speed  No ataxia    Coordination  Finger to nose-unremarkable    Lab Results   Component Value Date    NEITMMYR13 534 03/21/2018     Lab Results   Component Value Date    WBC 6.4 09/20/2022    HGB 13.3 09/20/2022    HCT 41.9 09/20/2022    MCV 98.1 09/20/2022     09/20/2022     Lab Results   Component Value Date     09/20/2022    K 4.2 09/20/2022     09/20/2022    CO2 27 09/20/2022    BUN 19 09/20/2022    CREATININE 0.9 09/20/2022    GLUCOSE 103 09/20/2022    CALCIUM 9.3 09/20/2022    PROT 7.4 09/20/2022    LABALBU 4.2 09/20/2022    BILITOT 0.7 09/20/2022    ALKPHOS 82 09/20/2022    AST 15 09/20/2022    ALT 11 09/20/2022    LABGLOM >60 09/20/2022    GFRAA >59 09/20/2022    GLOB 3.1 03/30/2017       Impression: The findings of this study are 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           ___________________________  Og Weeks M.D. Board certified in Neurology  Board certified in Clinical Neurophysiology  Fellowship trained in [address] [place] [address]    1. Neuropathy  G62.9       2. Polyneuropathy, unspecified  G62.9       3. Burning feet syndrome  E53.9       4. Pain, neck  M54.2       5. Other headache syndrome  G44.89       6. Low back pain, unspecified back pain laterality, unspecified chronicity, unspecified whether sciatica present  M54.50               Her neurological examination previously was unremarkable except for some reduced reflexes in the extremities. Based upon her history and examination she most likely has a peripheral neuropathy. Her EMG with nerve conduction study of the legs was suggestive of a sensory neuropathy. Her lab work was unremarkable. She will be continued on tramadol as this provides benefit. Talib Gonzales  She will increased her Tramadol to QID. She is off Elavil for sleep and neuropathy. Did PT for her back pain. Seeing Dr Lanie Crigler for her back. Getting steroid injection. Not feel with Norco whole pill. May take 1/2. The patient indicates understanding of the management plan. She is to follow-up with me in approximately 6 month and call with any further problems. continue care as treatment is helping. Continue present care as things stable. Plan    No orders of the defined types were placed in this encounter. No orders of the defined types were placed in this encounter. Return in about 6 months (around 8/22/2023). EMR Dragon/transcription disclaimer:Significant part of this  encounter note is electronic transcription/translation of spoken language to printed text. The electronic translation of spoken language may be erroneous, or at times, nonsensical words or phrases may be inadvertently transcribed.  Although I have reviewed the note for such errors, some may still exist.

## 2023-05-17 ENCOUNTER — OFFICE VISIT (OUTPATIENT)
Dept: PRIMARY CARE CLINIC | Age: 78
End: 2023-05-17

## 2023-05-17 VITALS
HEIGHT: 69 IN | SYSTOLIC BLOOD PRESSURE: 128 MMHG | HEART RATE: 72 BPM | OXYGEN SATURATION: 98 % | BODY MASS INDEX: 32.5 KG/M2 | DIASTOLIC BLOOD PRESSURE: 82 MMHG | WEIGHT: 219.4 LBS | RESPIRATION RATE: 18 BRPM | TEMPERATURE: 97.6 F

## 2023-05-17 DIAGNOSIS — L98.9 CHANGING SKIN LESION: Primary | ICD-10-CM

## 2023-05-17 DIAGNOSIS — F41.8 SITUATIONAL ANXIETY: ICD-10-CM

## 2023-05-17 RX ORDER — TRAZODONE HYDROCHLORIDE 50 MG/1
50 TABLET ORAL NIGHTLY
Qty: 30 TABLET | Refills: 5 | Status: SHIPPED | OUTPATIENT
Start: 2023-05-17

## 2023-05-17 SDOH — ECONOMIC STABILITY: HOUSING INSECURITY
IN THE LAST 12 MONTHS, WAS THERE A TIME WHEN YOU DID NOT HAVE A STEADY PLACE TO SLEEP OR SLEPT IN A SHELTER (INCLUDING NOW)?: NO

## 2023-05-17 SDOH — ECONOMIC STABILITY: FOOD INSECURITY: WITHIN THE PAST 12 MONTHS, THE FOOD YOU BOUGHT JUST DIDN'T LAST AND YOU DIDN'T HAVE MONEY TO GET MORE.: NEVER TRUE

## 2023-05-17 SDOH — ECONOMIC STABILITY: INCOME INSECURITY: HOW HARD IS IT FOR YOU TO PAY FOR THE VERY BASICS LIKE FOOD, HOUSING, MEDICAL CARE, AND HEATING?: NOT HARD AT ALL

## 2023-05-17 SDOH — ECONOMIC STABILITY: FOOD INSECURITY: WITHIN THE PAST 12 MONTHS, YOU WORRIED THAT YOUR FOOD WOULD RUN OUT BEFORE YOU GOT MONEY TO BUY MORE.: NEVER TRUE

## 2023-05-17 ASSESSMENT — PATIENT HEALTH QUESTIONNAIRE - PHQ9
SUM OF ALL RESPONSES TO PHQ QUESTIONS 1-9: 0
SUM OF ALL RESPONSES TO PHQ9 QUESTIONS 1 & 2: 0
SUM OF ALL RESPONSES TO PHQ QUESTIONS 1-9: 0
SUM OF ALL RESPONSES TO PHQ QUESTIONS 1-9: 0
2. FEELING DOWN, DEPRESSED OR HOPELESS: 0
SUM OF ALL RESPONSES TO PHQ QUESTIONS 1-9: 0
1. LITTLE INTEREST OR PLEASURE IN DOING THINGS: 0

## 2023-05-17 ASSESSMENT — ENCOUNTER SYMPTOMS: RESPIRATORY NEGATIVE: 1

## 2023-05-17 NOTE — PROGRESS NOTES
SUBJECTIVE:    Eliana Blount is 76 y. o.female who comes in complaining of Skin Problem (Right arm bump that is painful) and Stress   . HPI: Park Batres comes in today because she is concerned about a bump that has come up on her right arm. It is very painful. She cannot get into the dermatologist until July. This seems to be getting bigger. She feels good today. She is complaining of some trouble sleeping at night and some anxiety. Her  has been diagnosed with dementia and while he is still okay it is difficult because he constantly repeats things and ask her lots of questions. She is seeing Dr. Kristin Lambert for peripheral neuropathy and he put her on tramadol at night to help her sleep because of the pain. She has a follow-up with him. She is also been seeing Dr. Lindsay Castellanos who does injections in her back for a herniated disc. She says these do not seem to be helping. She denies any problems with local anesthetic agents. She feels good today. Allergies   Allergen Reactions    Neurontin [Gabapentin] Nausea Only       Social History     Socioeconomic History    Marital status:      Spouse name: None    Number of children: 1    Years of education: None    Highest education level: None   Occupational History    Occupation: retired    Tobacco Use    Smoking status: Never    Smokeless tobacco: Never   Vaping Use    Vaping Use: Never used   Substance and Sexual Activity    Alcohol use: No    Drug use: No    Sexual activity: Yes     Partners: Male     Social Determinants of Health     Financial Resource Strain: Low Risk     Difficulty of Paying Living Expenses: Not hard at all   Food Insecurity: No Food Insecurity    Worried About Running Out of Food in the Last Year: Never true    Ran Out of Food in the Last Year: Never true   Transportation Needs: Unknown    Lack of Transportation (Non-Medical):  No   Physical Activity: Inactive    Days of Exercise per Week: 0 days

## 2023-05-24 LAB
CLINICAL DIAGNOSIS: NORMAL
DIAGNOSIS ICD CODE: NORMAL
DIAGNOSIS ICD CODE: NORMAL
Lab: NORMAL
PATHOLOGIST: NORMAL
PAYMENT PROCEDURE: NORMAL
SITE: NORMAL

## 2023-05-30 ENCOUNTER — NURSE ONLY (OUTPATIENT)
Dept: PRIMARY CARE CLINIC | Age: 78
End: 2023-05-30

## 2023-05-30 DIAGNOSIS — Z48.02 VISIT FOR SUTURE REMOVAL: Primary | ICD-10-CM

## 2023-08-07 ENCOUNTER — TELEPHONE (OUTPATIENT)
Dept: NEUROLOGY | Age: 78
End: 2023-08-07

## 2023-08-07 NOTE — TELEPHONE ENCOUNTER
Tried to reach patient to reschedule their appointment with Dr. Cyn Olson, he will not be in the office. Left a voicemail asking patient to call the office back to reschedule.

## 2023-08-08 DIAGNOSIS — G62.9 NEUROPATHY: ICD-10-CM

## 2023-08-08 RX ORDER — TRAMADOL HYDROCHLORIDE 50 MG/1
50 TABLET ORAL EVERY 6 HOURS PRN
Qty: 120 TABLET | Refills: 5 | Status: SHIPPED | OUTPATIENT
Start: 2023-08-08 | End: 2024-02-04

## 2023-08-08 NOTE — TELEPHONE ENCOUNTER
Requested Prescriptions     Pending Prescriptions Disp Refills    traMADol (ULTRAM) 50 MG tablet 120 tablet 5     Sig: Take 1 tablet by mouth every 6 hours as needed for Pain for up to 30 days.  Take lowest dose possible to manage pain ok to fill today April 22 Max Daily Amount: 200 mg       Last Office Visit:  2/22/2023  Next Office Visit:  9/22/2023  Last Medication Refill: 2/13/2023 with 5 NORRIS Todd up to date:  8/8/2023    *RX updated to reflect   8/8/2023  fill date*

## 2023-08-08 NOTE — TELEPHONE ENCOUNTER
[facility] is requesting a refill of her : -      traMADol (ULTRAM) 50 MG      Last Appt:  2/22/2023  Next Appt:   9/22/2023  Preferred pharmacy: [facility], [address]

## 2023-09-22 ENCOUNTER — OFFICE VISIT (OUTPATIENT)
Dept: NEUROLOGY | Age: 78
End: 2023-09-22
Payer: MEDICARE

## 2023-09-22 VITALS
HEIGHT: 69 IN | SYSTOLIC BLOOD PRESSURE: 133 MMHG | BODY MASS INDEX: 32.44 KG/M2 | DIASTOLIC BLOOD PRESSURE: 83 MMHG | HEART RATE: 77 BPM | WEIGHT: 219 LBS

## 2023-09-22 DIAGNOSIS — M54.2 PAIN, NECK: ICD-10-CM

## 2023-09-22 DIAGNOSIS — M54.50 LOW BACK PAIN, UNSPECIFIED BACK PAIN LATERALITY, UNSPECIFIED CHRONICITY, UNSPECIFIED WHETHER SCIATICA PRESENT: ICD-10-CM

## 2023-09-22 DIAGNOSIS — G62.9 NEUROPATHY: Primary | ICD-10-CM

## 2023-09-22 DIAGNOSIS — G62.9 POLYNEUROPATHY, UNSPECIFIED: ICD-10-CM

## 2023-09-22 DIAGNOSIS — E53.9 BURNING FEET SYNDROME: ICD-10-CM

## 2023-09-22 DIAGNOSIS — G44.89 OTHER HEADACHE SYNDROME: ICD-10-CM

## 2023-09-22 PROCEDURE — 1123F ACP DISCUSS/DSCN MKR DOCD: CPT | Performed by: PSYCHIATRY & NEUROLOGY

## 2023-09-22 PROCEDURE — G8399 PT W/DXA RESULTS DOCUMENT: HCPCS | Performed by: PSYCHIATRY & NEUROLOGY

## 2023-09-22 PROCEDURE — G8427 DOCREV CUR MEDS BY ELIG CLIN: HCPCS | Performed by: PSYCHIATRY & NEUROLOGY

## 2023-09-22 PROCEDURE — 3079F DIAST BP 80-89 MM HG: CPT | Performed by: PSYCHIATRY & NEUROLOGY

## 2023-09-22 PROCEDURE — 3075F SYST BP GE 130 - 139MM HG: CPT | Performed by: PSYCHIATRY & NEUROLOGY

## 2023-09-22 PROCEDURE — 1090F PRES/ABSN URINE INCON ASSESS: CPT | Performed by: PSYCHIATRY & NEUROLOGY

## 2023-09-22 PROCEDURE — G8417 CALC BMI ABV UP PARAM F/U: HCPCS | Performed by: PSYCHIATRY & NEUROLOGY

## 2023-09-22 PROCEDURE — 1036F TOBACCO NON-USER: CPT | Performed by: PSYCHIATRY & NEUROLOGY

## 2023-09-22 PROCEDURE — 99214 OFFICE O/P EST MOD 30 MIN: CPT | Performed by: PSYCHIATRY & NEUROLOGY

## 2023-09-22 NOTE — PROGRESS NOTES
Chief Complaint   Patient presents with    Follow-up     Neuropathy follow up        Jose De Los Santos is a 78y.o. year old female who is seen for evaluation of burning feet. This has been present for around 2015 or so. It is typically worse at night when she lays down. She sometimes feels that the nerve endings to her body are not normal. She does have some chronic neck pain issues and some occasional low back pain issues. She denies any weakness. She indicates her mother and diabetes and neuropathy. She denies any exposure to chemicals, toxins, or pesticides. She has tried Cymbalta or amitriptyline in the past. Tramadol does help. Stretching helps. Did not tolerate trileptal. Going to chiropractor helps neck and back. Gabapentin made her sick. Tramadol still helps only one at night. .Cut back on sugar and going to chiropractor. . Itching flared up and now better. Not taking Klonopin. Tramadol at night helps. Wakes up quite a bit at night. March 6 th , 2021 left leg gave out and fell. Cracked right arm. Up to 3 Tramadol now and this helps her back as well. Seeing Dr Saqib Willis for low back pain. Going to pain management. Getting shot in back with Dr Ahsan Johns. Doing better with Tramadol 4 a day. Some low back pain.      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

## 2023-09-28 RX ORDER — PANTOPRAZOLE SODIUM 40 MG/1
TABLET, DELAYED RELEASE ORAL
Qty: 90 TABLET | Refills: 3 | Status: SHIPPED | OUTPATIENT
Start: 2023-09-28

## 2023-09-28 RX ORDER — LEVOTHYROXINE SODIUM 0.05 MG/1
TABLET ORAL
Qty: 90 TABLET | Refills: 3 | Status: SHIPPED | OUTPATIENT
Start: 2023-09-28

## 2023-10-09 ENCOUNTER — OFFICE VISIT (OUTPATIENT)
Dept: PRIMARY CARE CLINIC | Age: 78
End: 2023-10-09

## 2023-10-09 VITALS
OXYGEN SATURATION: 93 % | RESPIRATION RATE: 18 BRPM | HEART RATE: 83 BPM | DIASTOLIC BLOOD PRESSURE: 86 MMHG | WEIGHT: 220.4 LBS | BODY MASS INDEX: 32.64 KG/M2 | SYSTOLIC BLOOD PRESSURE: 128 MMHG | TEMPERATURE: 96.9 F | HEIGHT: 69 IN

## 2023-10-09 DIAGNOSIS — E55.9 VITAMIN D DEFICIENCY: ICD-10-CM

## 2023-10-09 DIAGNOSIS — L57.0 ACTINIC KERATOSES: ICD-10-CM

## 2023-10-09 DIAGNOSIS — I10 ESSENTIAL HYPERTENSION: ICD-10-CM

## 2023-10-09 DIAGNOSIS — E03.9 ACQUIRED HYPOTHYROIDISM: ICD-10-CM

## 2023-10-09 DIAGNOSIS — Z12.31 ENCOUNTER FOR SCREENING MAMMOGRAM FOR BREAST CANCER: ICD-10-CM

## 2023-10-09 DIAGNOSIS — F41.8 SITUATIONAL ANXIETY: ICD-10-CM

## 2023-10-09 DIAGNOSIS — G62.9 NEUROPATHY: ICD-10-CM

## 2023-10-09 DIAGNOSIS — M54.50 CHRONIC BILATERAL LOW BACK PAIN WITHOUT SCIATICA: ICD-10-CM

## 2023-10-09 DIAGNOSIS — Z00.00 MEDICARE ANNUAL WELLNESS VISIT, SUBSEQUENT: Primary | ICD-10-CM

## 2023-10-09 DIAGNOSIS — G89.29 CHRONIC BILATERAL LOW BACK PAIN WITHOUT SCIATICA: ICD-10-CM

## 2023-10-09 DIAGNOSIS — E78.00 HYPERCHOLESTEROLEMIA: ICD-10-CM

## 2023-10-09 RX ORDER — LISINOPRIL 10 MG/1
TABLET ORAL
Qty: 90 TABLET | Refills: 3 | Status: SHIPPED | OUTPATIENT
Start: 2023-10-09

## 2023-10-09 RX ORDER — TRAZODONE HYDROCHLORIDE 50 MG/1
50 TABLET ORAL NIGHTLY
Qty: 90 TABLET | Refills: 1 | Status: SHIPPED | OUTPATIENT
Start: 2023-10-09

## 2023-10-09 ASSESSMENT — PATIENT HEALTH QUESTIONNAIRE - PHQ9
SUM OF ALL RESPONSES TO PHQ QUESTIONS 1-9: 0
SUM OF ALL RESPONSES TO PHQ QUESTIONS 1-9: 0
SUM OF ALL RESPONSES TO PHQ9 QUESTIONS 1 & 2: 0
2. FEELING DOWN, DEPRESSED OR HOPELESS: 0
1. LITTLE INTEREST OR PLEASURE IN DOING THINGS: 0
SUM OF ALL RESPONSES TO PHQ QUESTIONS 1-9: 0
SUM OF ALL RESPONSES TO PHQ QUESTIONS 1-9: 0

## 2023-10-09 ASSESSMENT — LIFESTYLE VARIABLES
HOW OFTEN DO YOU HAVE A DRINK CONTAINING ALCOHOL: NEVER
HOW MANY STANDARD DRINKS CONTAINING ALCOHOL DO YOU HAVE ON A TYPICAL DAY: PATIENT DOES NOT DRINK

## 2023-10-12 ENCOUNTER — NURSE ONLY (OUTPATIENT)
Dept: PRIMARY CARE CLINIC | Age: 78
End: 2023-10-12

## 2023-10-12 DIAGNOSIS — E55.9 VITAMIN D DEFICIENCY: ICD-10-CM

## 2023-10-12 DIAGNOSIS — I10 ESSENTIAL HYPERTENSION: ICD-10-CM

## 2023-10-12 DIAGNOSIS — E03.9 ACQUIRED HYPOTHYROIDISM: ICD-10-CM

## 2023-10-12 DIAGNOSIS — E78.00 HYPERCHOLESTEROLEMIA: ICD-10-CM

## 2023-10-12 LAB
25(OH)D3 SERPL-MCNC: 46.8 NG/ML
ALBUMIN SERPL-MCNC: 4.4 G/DL (ref 3.5–5.2)
ALP SERPL-CCNC: 81 U/L (ref 35–104)
ALT SERPL-CCNC: 11 U/L (ref 5–33)
ANION GAP SERPL CALCULATED.3IONS-SCNC: 16 MMOL/L (ref 7–19)
AST SERPL-CCNC: 17 U/L (ref 5–32)
BILIRUB SERPL-MCNC: 0.6 MG/DL (ref 0.2–1.2)
BUN SERPL-MCNC: 22 MG/DL (ref 8–23)
CALCIUM SERPL-MCNC: 9.5 MG/DL (ref 8.8–10.2)
CHLORIDE SERPL-SCNC: 103 MMOL/L (ref 98–111)
CHOLEST SERPL-MCNC: 192 MG/DL (ref 160–199)
CO2 SERPL-SCNC: 21 MMOL/L (ref 22–29)
CREAT SERPL-MCNC: 1.3 MG/DL (ref 0.5–0.9)
ERYTHROCYTE [DISTWIDTH] IN BLOOD BY AUTOMATED COUNT: 12.7 % (ref 11.5–14.5)
GLUCOSE SERPL-MCNC: 124 MG/DL (ref 74–109)
HCT VFR BLD AUTO: 44.7 % (ref 37–47)
HDLC SERPL-MCNC: 69 MG/DL (ref 65–121)
HGB BLD-MCNC: 14.6 G/DL (ref 12–16)
LDLC SERPL CALC-MCNC: 104 MG/DL
MCH RBC QN AUTO: 31.5 PG (ref 27–31)
MCHC RBC AUTO-ENTMCNC: 32.7 G/DL (ref 33–37)
MCV RBC AUTO: 96.3 FL (ref 81–99)
PLATELET # BLD AUTO: 240 K/UL (ref 130–400)
PMV BLD AUTO: 10.2 FL (ref 9.4–12.3)
POTASSIUM SERPL-SCNC: 5.2 MMOL/L (ref 3.5–5)
PROT SERPL-MCNC: 8 G/DL (ref 6.6–8.7)
RBC # BLD AUTO: 4.64 M/UL (ref 4.2–5.4)
SODIUM SERPL-SCNC: 140 MMOL/L (ref 136–145)
TRIGL SERPL-MCNC: 96 MG/DL (ref 0–149)
TSH SERPL DL<=0.005 MIU/L-ACNC: 2.35 UIU/ML (ref 0.35–5.5)
WBC # BLD AUTO: 7.2 K/UL (ref 4.8–10.8)

## 2023-11-07 DIAGNOSIS — I10 ESSENTIAL HYPERTENSION: ICD-10-CM

## 2023-11-07 RX ORDER — LISINOPRIL 10 MG/1
TABLET ORAL
Qty: 90 TABLET | Refills: 3 | Status: SHIPPED | OUTPATIENT
Start: 2023-11-07

## 2024-01-29 DIAGNOSIS — G62.9 NEUROPATHY: ICD-10-CM

## 2024-01-29 RX ORDER — TRAMADOL HYDROCHLORIDE 50 MG/1
50 TABLET ORAL EVERY 6 HOURS PRN
Qty: 120 TABLET | Refills: 5 | Status: SHIPPED | OUTPATIENT
Start: 2024-01-29 | End: 2024-02-28

## 2024-01-29 NOTE — TELEPHONE ENCOUNTER
Requested Prescriptions     Pending Prescriptions Disp Refills    traMADol (ULTRAM) 50 MG tablet [Pharmacy Med Name: tramadol 50 mg tablet] 120 tablet 5     Sig: Take 1 tablet by mouth every 6 hours as needed for Pain for up to 30 days. Max Daily Amount: 200 mg       Last Office Visit: 9/22/2023  Next Office Visit: 3/22/2024  Last Medication Refill: 8/8/2023 with 5 RF

## 2024-03-04 ENCOUNTER — OFFICE VISIT (OUTPATIENT)
Dept: PRIMARY CARE CLINIC | Age: 79
End: 2024-03-04
Payer: MEDICARE

## 2024-03-04 VITALS
WEIGHT: 216 LBS | TEMPERATURE: 97.4 F | HEART RATE: 68 BPM | OXYGEN SATURATION: 98 % | DIASTOLIC BLOOD PRESSURE: 80 MMHG | SYSTOLIC BLOOD PRESSURE: 134 MMHG | RESPIRATION RATE: 18 BRPM | BODY MASS INDEX: 31.99 KG/M2 | HEIGHT: 69 IN

## 2024-03-04 DIAGNOSIS — M25.552 CHRONIC LEFT HIP PAIN: ICD-10-CM

## 2024-03-04 DIAGNOSIS — N28.9 DECREASED RENAL FUNCTION: ICD-10-CM

## 2024-03-04 DIAGNOSIS — M25.511 CHRONIC RIGHT SHOULDER PAIN: Primary | ICD-10-CM

## 2024-03-04 DIAGNOSIS — G89.29 CHRONIC LEFT HIP PAIN: ICD-10-CM

## 2024-03-04 DIAGNOSIS — G89.29 CHRONIC RIGHT SHOULDER PAIN: Primary | ICD-10-CM

## 2024-03-04 PROCEDURE — 1123F ACP DISCUSS/DSCN MKR DOCD: CPT | Performed by: FAMILY MEDICINE

## 2024-03-04 PROCEDURE — 1036F TOBACCO NON-USER: CPT | Performed by: FAMILY MEDICINE

## 2024-03-04 PROCEDURE — G8399 PT W/DXA RESULTS DOCUMENT: HCPCS | Performed by: FAMILY MEDICINE

## 2024-03-04 PROCEDURE — G8417 CALC BMI ABV UP PARAM F/U: HCPCS | Performed by: FAMILY MEDICINE

## 2024-03-04 PROCEDURE — 3079F DIAST BP 80-89 MM HG: CPT | Performed by: FAMILY MEDICINE

## 2024-03-04 PROCEDURE — 1090F PRES/ABSN URINE INCON ASSESS: CPT | Performed by: FAMILY MEDICINE

## 2024-03-04 PROCEDURE — 99213 OFFICE O/P EST LOW 20 MIN: CPT | Performed by: FAMILY MEDICINE

## 2024-03-04 PROCEDURE — 3075F SYST BP GE 130 - 139MM HG: CPT | Performed by: FAMILY MEDICINE

## 2024-03-04 PROCEDURE — G8484 FLU IMMUNIZE NO ADMIN: HCPCS | Performed by: FAMILY MEDICINE

## 2024-03-04 PROCEDURE — G8427 DOCREV CUR MEDS BY ELIG CLIN: HCPCS | Performed by: FAMILY MEDICINE

## 2024-03-04 RX ORDER — TRAMADOL HYDROCHLORIDE 50 MG/1
50 TABLET ORAL EVERY 6 HOURS PRN
COMMUNITY
Start: 2024-02-29

## 2024-03-04 ASSESSMENT — PATIENT HEALTH QUESTIONNAIRE - PHQ9
1. LITTLE INTEREST OR PLEASURE IN DOING THINGS: 0
SUM OF ALL RESPONSES TO PHQ QUESTIONS 1-9: 0
SUM OF ALL RESPONSES TO PHQ9 QUESTIONS 1 & 2: 0
2. FEELING DOWN, DEPRESSED OR HOPELESS: 0
SUM OF ALL RESPONSES TO PHQ QUESTIONS 1-9: 0

## 2024-03-04 ASSESSMENT — ENCOUNTER SYMPTOMS
RESPIRATORY NEGATIVE: 1
GASTROINTESTINAL NEGATIVE: 1

## 2024-03-04 NOTE — PROGRESS NOTES
SUBJECTIVE:    Margot Go is 78 y.o.female who comes in complaining of Follow-up (6-month-f/u.  Presents today with her cane.  ), Hypertension (Doing good.  ), and Referral - General (For someone to help her with body pain.  After a fall where she fractured ribs and general soreness all over.  Tramadol on hand for neuropathy.  Chronic back pain L4-5 and left hip since fall.  )   .       HPI: Margot comes in today for follow-up of her blood pressure which is good but she would like a referral to Dr. Jose.  She fell several months ago and fractured several right ribs.  The pain was terrible but that is better now.  However she says she is just not right.  She was already receiving tramadol from Dr. Kumar before the fall and she took it when she had the fractured ribs.  However now her problem is her right shoulder and her left hip.  She says her left leg seems shorter than the right.  Her gait is abnormal and it hurts to walk.  She is having to use a cane.  She went to physical therapy before the fall but it did not help any of her pain.  She cannot use her right shoulder like she once did.  She says she is afraid it is all getting worse because she is not walking normally.    She is also here for recheck of her renal function which was slightly decreased 6 months ago.  She is trying to increase her water intake.      Allergies   Allergen Reactions    Neurontin [Gabapentin] Nausea Only       Social History     Socioeconomic History    Marital status:      Spouse name: None    Number of children: 1    Years of education: None    Highest education level: None   Occupational History    Occupation: retired    Tobacco Use    Smoking status: Never    Smokeless tobacco: Never   Vaping Use    Vaping Use: Never used   Substance and Sexual Activity    Alcohol use: No    Drug use: No    Sexual activity: Not Currently     Partners: Male     Social Determinants of Health     Financial Resource Strain: Low

## 2024-03-05 LAB
ALBUMIN SERPL-MCNC: 4.2 G/DL (ref 3.5–5.2)
ALP SERPL-CCNC: 91 U/L (ref 35–104)
ALT SERPL-CCNC: 11 U/L (ref 5–33)
ANION GAP SERPL CALCULATED.3IONS-SCNC: 9 MMOL/L (ref 7–19)
AST SERPL-CCNC: 16 U/L (ref 5–32)
BILIRUB SERPL-MCNC: 0.5 MG/DL (ref 0.2–1.2)
BUN SERPL-MCNC: 14 MG/DL (ref 8–23)
CALCIUM SERPL-MCNC: 9.2 MG/DL (ref 8.8–10.2)
CHLORIDE SERPL-SCNC: 103 MMOL/L (ref 98–111)
CO2 SERPL-SCNC: 26 MMOL/L (ref 22–29)
CREAT SERPL-MCNC: 0.9 MG/DL (ref 0.5–0.9)
ERYTHROCYTE [DISTWIDTH] IN BLOOD BY AUTOMATED COUNT: 12.6 % (ref 11.5–14.5)
GLUCOSE SERPL-MCNC: 91 MG/DL (ref 74–109)
HCT VFR BLD AUTO: 42 % (ref 37–47)
HGB BLD-MCNC: 13.6 G/DL (ref 12–16)
MCH RBC QN AUTO: 31.1 PG (ref 27–31)
MCHC RBC AUTO-ENTMCNC: 32.4 G/DL (ref 33–37)
MCV RBC AUTO: 96.1 FL (ref 81–99)
PLATELET # BLD AUTO: 188 K/UL (ref 130–400)
PMV BLD AUTO: 10.9 FL (ref 9.4–12.3)
POTASSIUM SERPL-SCNC: 4.3 MMOL/L (ref 3.5–5)
PROT SERPL-MCNC: 7.2 G/DL (ref 6.6–8.7)
RBC # BLD AUTO: 4.37 M/UL (ref 4.2–5.4)
SODIUM SERPL-SCNC: 138 MMOL/L (ref 136–145)
WBC # BLD AUTO: 6.1 K/UL (ref 4.8–10.8)

## 2024-03-12 ENCOUNTER — TELEPHONE (OUTPATIENT)
Dept: NEUROLOGY | Age: 79
End: 2024-03-12

## 2024-03-12 NOTE — TELEPHONE ENCOUNTER
Called and left patient a VM to let her know that her appointment for 03-22-24 with Dr. Campbell had to be rescheduled due to patient was scheduled with the wrong doctor. Left on VM of when I have patient appointment rescheduled with Dr. Kumar.

## 2024-03-27 ENCOUNTER — OFFICE VISIT (OUTPATIENT)
Dept: NEUROLOGY | Age: 79
End: 2024-03-27
Payer: MEDICARE

## 2024-03-27 VITALS
DIASTOLIC BLOOD PRESSURE: 85 MMHG | HEART RATE: 67 BPM | WEIGHT: 216 LBS | SYSTOLIC BLOOD PRESSURE: 170 MMHG | BODY MASS INDEX: 31.99 KG/M2 | HEIGHT: 69 IN

## 2024-03-27 DIAGNOSIS — M54.2 PAIN, NECK: ICD-10-CM

## 2024-03-27 DIAGNOSIS — G62.9 NEUROPATHY: Primary | ICD-10-CM

## 2024-03-27 DIAGNOSIS — M25.552 ARTHRALGIA OF LEFT HIP: ICD-10-CM

## 2024-03-27 DIAGNOSIS — G62.9 POLYNEUROPATHY, UNSPECIFIED: ICD-10-CM

## 2024-03-27 DIAGNOSIS — E53.9 BURNING FEET SYNDROME: ICD-10-CM

## 2024-03-27 DIAGNOSIS — M25.511 ACUTE PAIN OF RIGHT SHOULDER: ICD-10-CM

## 2024-03-27 DIAGNOSIS — M54.50 LOW BACK PAIN, UNSPECIFIED BACK PAIN LATERALITY, UNSPECIFIED CHRONICITY, UNSPECIFIED WHETHER SCIATICA PRESENT: ICD-10-CM

## 2024-03-27 DIAGNOSIS — G44.89 OTHER HEADACHE SYNDROME: ICD-10-CM

## 2024-03-27 PROCEDURE — 1123F ACP DISCUSS/DSCN MKR DOCD: CPT | Performed by: PSYCHIATRY & NEUROLOGY

## 2024-03-27 PROCEDURE — G8427 DOCREV CUR MEDS BY ELIG CLIN: HCPCS | Performed by: PSYCHIATRY & NEUROLOGY

## 2024-03-27 PROCEDURE — G8417 CALC BMI ABV UP PARAM F/U: HCPCS | Performed by: PSYCHIATRY & NEUROLOGY

## 2024-03-27 PROCEDURE — 3077F SYST BP >= 140 MM HG: CPT | Performed by: PSYCHIATRY & NEUROLOGY

## 2024-03-27 PROCEDURE — 1036F TOBACCO NON-USER: CPT | Performed by: PSYCHIATRY & NEUROLOGY

## 2024-03-27 PROCEDURE — 1090F PRES/ABSN URINE INCON ASSESS: CPT | Performed by: PSYCHIATRY & NEUROLOGY

## 2024-03-27 PROCEDURE — 99213 OFFICE O/P EST LOW 20 MIN: CPT | Performed by: PSYCHIATRY & NEUROLOGY

## 2024-03-27 PROCEDURE — G8484 FLU IMMUNIZE NO ADMIN: HCPCS | Performed by: PSYCHIATRY & NEUROLOGY

## 2024-03-27 PROCEDURE — G8399 PT W/DXA RESULTS DOCUMENT: HCPCS | Performed by: PSYCHIATRY & NEUROLOGY

## 2024-03-27 PROCEDURE — 3079F DIAST BP 80-89 MM HG: CPT | Performed by: PSYCHIATRY & NEUROLOGY

## 2024-03-27 RX ORDER — HYDROCODONE BITARTRATE AND ACETAMINOPHEN 10; 325 MG/1; MG/1
1 TABLET ORAL EVERY 6 HOURS PRN
Qty: 30 TABLET | Refills: 0 | Status: SHIPPED | OUTPATIENT
Start: 2024-03-27 | End: 2024-04-26

## 2024-03-27 NOTE — PROGRESS NOTES
Review of Systems    Constitutional - No fever or chills.  No diaphoresis or significant fatigue.  HENT -  No tinnitus or significant hearing loss.  Eyes - no sudden vision change or eye pain  Respiratory - no significant shortness of breath or cough  Cardiovascular - no chest pain No palpitations or significant leg swelling  Gastrointestinal - no abdominal swelling or pain.    Genitourinary - No difficulty urinating, dysuria  Musculoskeletal - no back pain or myalgia.  Skin - no color change or rash  Neurologic - No seizures.  No lateralizing weakness.  Hematologic - no easy bruising or excessive bleeding.  Psychiatric - no severe anxiety or nervousness.   All other review of systems are negative.    
manageable           Return in about 3 months (around 6/27/2024).      EMR Dragon/transcription disclaimer:Significant part of this  encounter note is electronic transcription/translation of spoken language to printed text. The electronic translation of spoken language may be erroneous, or at times, nonsensical words or phrases may be inadvertently transcribed. Although I have reviewed the note for such errors, some may still exist.

## 2024-04-01 DIAGNOSIS — Z12.31 ENCOUNTER FOR SCREENING MAMMOGRAM FOR BREAST CANCER: ICD-10-CM

## 2024-04-22 ENCOUNTER — HOSPITAL ENCOUNTER (OUTPATIENT)
Dept: MRI IMAGING | Age: 79
Discharge: HOME | End: 2024-04-22
Attending: PSYCHIATRY & NEUROLOGY
Payer: MEDICARE

## 2024-04-22 DIAGNOSIS — M25.552 ARTHRALGIA OF LEFT HIP: ICD-10-CM

## 2024-04-22 DIAGNOSIS — M54.50 LOW BACK PAIN, UNSPECIFIED BACK PAIN LATERALITY, UNSPECIFIED CHRONICITY, UNSPECIFIED WHETHER SCIATICA PRESENT: ICD-10-CM

## 2024-04-22 DIAGNOSIS — M25.511 ACUTE PAIN OF RIGHT SHOULDER: ICD-10-CM

## 2024-04-22 PROCEDURE — 72148 MRI LUMBAR SPINE W/O DYE: CPT

## 2024-04-22 PROCEDURE — 73721 MRI JNT OF LWR EXTRE W/O DYE: CPT

## 2024-04-22 PROCEDURE — 73221 MRI JOINT UPR EXTREM W/O DYE: CPT

## 2024-05-01 ENCOUNTER — TELEPHONE (OUTPATIENT)
Dept: NEUROLOGY | Age: 79
End: 2024-05-01

## 2024-05-01 DIAGNOSIS — M54.50 LOW BACK PAIN, UNSPECIFIED BACK PAIN LATERALITY, UNSPECIFIED CHRONICITY, UNSPECIFIED WHETHER SCIATICA PRESENT: Primary | ICD-10-CM

## 2024-05-01 NOTE — TELEPHONE ENCOUNTER
Margot would like a call to discuss  MRI results . She also wants a referral sent to [facility]. Margot preferred call back time is   as soon as possible      Thank you.

## 2024-05-10 NOTE — TELEPHONE ENCOUNTER
Spoke with the patient and let her know what Dr. Campbell said. I apologized that due to Dr. Kumar being out of the office we are having to send messages to the on call doctor. She stated this is ridiculous that she hasn't heard anything and her tests were 3 weeks ago. I told her again that I was sorry and Dr. Kumar has been out for 3 weeks and there was nothing emergent that needed to be addressed on the results. She asked about the referral to [facility]. I told her I would check on it and make sure it was sent over today if it hadn't been sent already.     I did double check and it hadn't been placed yet so I placed the order and faxed everything myself. I told her she should receive a call from them next week and if she doesn't she is welcome to call them or call us back.

## 2024-05-13 DIAGNOSIS — M25.559 ARTHRALGIA OF HIP, UNSPECIFIED LATERALITY: Primary | ICD-10-CM

## 2024-07-03 ENCOUNTER — OFFICE VISIT (OUTPATIENT)
Dept: NEUROLOGY | Age: 79
End: 2024-07-03
Payer: MEDICARE

## 2024-07-03 VITALS
WEIGHT: 214 LBS | HEART RATE: 63 BPM | SYSTOLIC BLOOD PRESSURE: 131 MMHG | BODY MASS INDEX: 31.7 KG/M2 | DIASTOLIC BLOOD PRESSURE: 79 MMHG | HEIGHT: 69 IN

## 2024-07-03 DIAGNOSIS — M54.50 LOW BACK PAIN, UNSPECIFIED BACK PAIN LATERALITY, UNSPECIFIED CHRONICITY, UNSPECIFIED WHETHER SCIATICA PRESENT: Primary | ICD-10-CM

## 2024-07-03 DIAGNOSIS — G62.9 POLYNEUROPATHY, UNSPECIFIED: ICD-10-CM

## 2024-07-03 DIAGNOSIS — E53.9 BURNING FEET SYNDROME: ICD-10-CM

## 2024-07-03 DIAGNOSIS — G62.9 NEUROPATHY: ICD-10-CM

## 2024-07-03 DIAGNOSIS — M54.2 PAIN, NECK: ICD-10-CM

## 2024-07-03 DIAGNOSIS — G44.89 OTHER HEADACHE SYNDROME: ICD-10-CM

## 2024-07-03 PROCEDURE — G8427 DOCREV CUR MEDS BY ELIG CLIN: HCPCS | Performed by: PSYCHIATRY & NEUROLOGY

## 2024-07-03 PROCEDURE — G8399 PT W/DXA RESULTS DOCUMENT: HCPCS | Performed by: PSYCHIATRY & NEUROLOGY

## 2024-07-03 PROCEDURE — 1036F TOBACCO NON-USER: CPT | Performed by: PSYCHIATRY & NEUROLOGY

## 2024-07-03 PROCEDURE — 3075F SYST BP GE 130 - 139MM HG: CPT | Performed by: PSYCHIATRY & NEUROLOGY

## 2024-07-03 PROCEDURE — 99213 OFFICE O/P EST LOW 20 MIN: CPT | Performed by: PSYCHIATRY & NEUROLOGY

## 2024-07-03 PROCEDURE — 1090F PRES/ABSN URINE INCON ASSESS: CPT | Performed by: PSYCHIATRY & NEUROLOGY

## 2024-07-03 PROCEDURE — 3078F DIAST BP <80 MM HG: CPT | Performed by: PSYCHIATRY & NEUROLOGY

## 2024-07-03 PROCEDURE — 1123F ACP DISCUSS/DSCN MKR DOCD: CPT | Performed by: PSYCHIATRY & NEUROLOGY

## 2024-07-03 PROCEDURE — G8417 CALC BMI ABV UP PARAM F/U: HCPCS | Performed by: PSYCHIATRY & NEUROLOGY

## 2024-07-03 RX ORDER — TRAMADOL HYDROCHLORIDE 50 MG/1
50 TABLET ORAL 4 TIMES DAILY PRN
Qty: 150 TABLET | Refills: 5 | Status: SHIPPED | OUTPATIENT
Start: 2024-07-03 | End: 2024-08-02

## 2024-07-03 RX ORDER — HYDROCODONE BITARTRATE AND ACETAMINOPHEN 10; 325 MG/1; MG/1
1 TABLET ORAL EVERY 6 HOURS PRN
Qty: 30 TABLET | Refills: 0 | Status: SHIPPED | OUTPATIENT
Start: 2024-07-03 | End: 2024-08-02

## 2024-07-03 NOTE — PROGRESS NOTES
Chief Complaint   Patient presents with    Follow-up     Neuropathy        Margot Go is a 79 y.o. year old female who is seen for evaluation of burning feet. This has been present for around 2015 or so. It is typically worse at night when she lays down. She sometimes feels that the nerve endings to her body are not normal. She does have some chronic neck pain issues and some occasional low back pain issues. She denies any weakness. She indicates her mother and diabetes and neuropathy. She denies any exposure to chemicals, toxins, or pesticides. She has tried Cymbalta or amitriptyline in the past. Tramadol does help. Stretching helps. Did not tolerate trileptal. Going to chiropractor helps neck and back. Gabapentin made her sick. Tramadol still helps only one at night..Cut back on sugar and going to chiropractor. . Itching flared up and now better. Not taking Klonopin. Tramadol at night helps. Wakes up quite a bit at night. March 6 th , 2021 left leg gave out and fell. Cracked right arm. Up to 3 Tramadol now and this helps her back as well. Seeing Dr Carreno for low back pain. Going to pain management. Getting shot in back with Dr De Jesus. Doing better with Tramadol 4 a day.Some low back pain. Fell previously and hit shoulder and ribs.Fractured 3 ribs on right. More low back pain. Given Toradol but raised blood pressure. Going to pain management.     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

## 2024-09-16 ENCOUNTER — TELEPHONE (OUTPATIENT)
Dept: PRIMARY CARE CLINIC | Age: 79
End: 2024-09-16

## 2024-09-23 RX ORDER — PANTOPRAZOLE SODIUM 40 MG/1
TABLET, DELAYED RELEASE ORAL
Qty: 90 TABLET | Refills: 3 | Status: SHIPPED | OUTPATIENT
Start: 2024-09-23

## 2024-09-23 RX ORDER — LEVOTHYROXINE SODIUM 50 UG/1
TABLET ORAL
Qty: 90 TABLET | Refills: 3 | Status: SHIPPED | OUTPATIENT
Start: 2024-09-23

## 2024-10-07 DIAGNOSIS — M54.50 LOW BACK PAIN, UNSPECIFIED BACK PAIN LATERALITY, UNSPECIFIED CHRONICITY, UNSPECIFIED WHETHER SCIATICA PRESENT: ICD-10-CM

## 2024-10-07 RX ORDER — TRAMADOL HYDROCHLORIDE 50 MG/1
50 TABLET ORAL 4 TIMES DAILY PRN
Qty: 150 TABLET | Refills: 1 | Status: SHIPPED | OUTPATIENT
Start: 2024-10-07 | End: 2024-11-06

## 2024-10-07 NOTE — TELEPHONE ENCOUNTER
Patient called into office stating that they have moved to [place] and need script canceled at [facility] and sent to [facility] on [address].      Canceled script at [facility]  Please resend to [facility]         Requested Prescriptions     Pending Prescriptions Disp Refills    traMADol (ULTRAM) 50 MG tablet 150 tablet 5     Sig: Take 1 tablet by mouth 4 times daily as needed for Pain for up to 30 days. Max Daily Amount: 200 mg       Last Office Visit:  7/3/2024  Next Office Visit:  Visit date not found  Last Medication Refill:  9/4/2024   Brent up to date:  10/7/47609    *RX updated to reflect   10/7/2024  fill date*

## 2024-10-11 SDOH — ECONOMIC STABILITY: TRANSPORTATION INSECURITY
IN THE PAST 12 MONTHS, HAS LACK OF TRANSPORTATION KEPT YOU FROM MEETINGS, WORK, OR FROM GETTING THINGS NEEDED FOR DAILY LIVING?: NO

## 2024-10-11 SDOH — HEALTH STABILITY: PHYSICAL HEALTH: ON AVERAGE, HOW MANY MINUTES DO YOU ENGAGE IN EXERCISE AT THIS LEVEL?: 0 MIN

## 2024-10-11 SDOH — ECONOMIC STABILITY: FOOD INSECURITY: WITHIN THE PAST 12 MONTHS, YOU WORRIED THAT YOUR FOOD WOULD RUN OUT BEFORE YOU GOT MONEY TO BUY MORE.: NEVER TRUE

## 2024-10-11 SDOH — ECONOMIC STABILITY: FOOD INSECURITY: WITHIN THE PAST 12 MONTHS, THE FOOD YOU BOUGHT JUST DIDN'T LAST AND YOU DIDN'T HAVE MONEY TO GET MORE.: NEVER TRUE

## 2024-10-11 SDOH — ECONOMIC STABILITY: INCOME INSECURITY: HOW HARD IS IT FOR YOU TO PAY FOR THE VERY BASICS LIKE FOOD, HOUSING, MEDICAL CARE, AND HEATING?: NOT HARD AT ALL

## 2024-10-11 ASSESSMENT — PATIENT HEALTH QUESTIONNAIRE - PHQ9
SUM OF ALL RESPONSES TO PHQ QUESTIONS 1-9: 0
SUM OF ALL RESPONSES TO PHQ9 QUESTIONS 1 & 2: 0
SUM OF ALL RESPONSES TO PHQ QUESTIONS 1-9: 0
SUM OF ALL RESPONSES TO PHQ QUESTIONS 1-9: 0
2. FEELING DOWN, DEPRESSED OR HOPELESS: NOT AT ALL
1. LITTLE INTEREST OR PLEASURE IN DOING THINGS: NOT AT ALL
SUM OF ALL RESPONSES TO PHQ QUESTIONS 1-9: 0

## 2024-10-11 ASSESSMENT — LIFESTYLE VARIABLES
HOW MANY STANDARD DRINKS CONTAINING ALCOHOL DO YOU HAVE ON A TYPICAL DAY: 0
HOW OFTEN DO YOU HAVE A DRINK CONTAINING ALCOHOL: 1
HOW MANY STANDARD DRINKS CONTAINING ALCOHOL DO YOU HAVE ON A TYPICAL DAY: PATIENT DOES NOT DRINK
HOW OFTEN DO YOU HAVE SIX OR MORE DRINKS ON ONE OCCASION: 1
HOW OFTEN DO YOU HAVE A DRINK CONTAINING ALCOHOL: NEVER

## 2024-10-14 ENCOUNTER — OFFICE VISIT (OUTPATIENT)
Dept: PRIMARY CARE CLINIC | Age: 79
End: 2024-10-14
Payer: MEDICARE

## 2024-10-14 VITALS
OXYGEN SATURATION: 96 % | TEMPERATURE: 96.9 F | RESPIRATION RATE: 18 BRPM | DIASTOLIC BLOOD PRESSURE: 78 MMHG | SYSTOLIC BLOOD PRESSURE: 128 MMHG | WEIGHT: 207.2 LBS | HEART RATE: 90 BPM | BODY MASS INDEX: 30.69 KG/M2 | HEIGHT: 69 IN

## 2024-10-14 DIAGNOSIS — E03.9 ACQUIRED HYPOTHYROIDISM: ICD-10-CM

## 2024-10-14 DIAGNOSIS — I10 ESSENTIAL HYPERTENSION: ICD-10-CM

## 2024-10-14 DIAGNOSIS — R82.90 ABNORMAL URINE: ICD-10-CM

## 2024-10-14 DIAGNOSIS — R31.9 HEMATURIA, UNSPECIFIED TYPE: ICD-10-CM

## 2024-10-14 DIAGNOSIS — E78.00 HYPERCHOLESTEROLEMIA: ICD-10-CM

## 2024-10-14 DIAGNOSIS — Z12.31 ENCOUNTER FOR SCREENING MAMMOGRAM FOR BREAST CANCER: ICD-10-CM

## 2024-10-14 DIAGNOSIS — E55.9 VITAMIN D DEFICIENCY: ICD-10-CM

## 2024-10-14 DIAGNOSIS — Z00.00 MEDICARE ANNUAL WELLNESS VISIT, SUBSEQUENT: Primary | ICD-10-CM

## 2024-10-14 DIAGNOSIS — M85.80 OSTEOPENIA, UNSPECIFIED LOCATION: ICD-10-CM

## 2024-10-14 LAB
25(OH)D3 SERPL-MCNC: 41.6 NG/ML
ALBUMIN SERPL-MCNC: 4.3 G/DL (ref 3.5–5.2)
ALP SERPL-CCNC: 80 U/L (ref 35–104)
ALT SERPL-CCNC: 15 U/L (ref 5–33)
ANION GAP SERPL CALCULATED.3IONS-SCNC: 12 MMOL/L (ref 7–19)
AST SERPL-CCNC: 17 U/L (ref 5–32)
BILIRUB SERPL-MCNC: 0.7 MG/DL (ref 0.2–1.2)
BILIRUBIN, POC: ABNORMAL
BLOOD URINE, POC: ABNORMAL
BUN SERPL-MCNC: 20 MG/DL (ref 8–23)
CALCIUM SERPL-MCNC: 9.5 MG/DL (ref 8.8–10.2)
CHLORIDE SERPL-SCNC: 99 MMOL/L (ref 98–111)
CHOLEST SERPL-MCNC: 183 MG/DL (ref 0–199)
CLARITY, POC: CLEAR
CO2 SERPL-SCNC: 28 MMOL/L (ref 22–29)
COLOR, POC: YELLOW
CREAT SERPL-MCNC: 1 MG/DL (ref 0.5–0.9)
ERYTHROCYTE [DISTWIDTH] IN BLOOD BY AUTOMATED COUNT: 13.3 % (ref 11.5–14.5)
GLUCOSE SERPL-MCNC: 89 MG/DL (ref 70–99)
GLUCOSE URINE, POC: ABNORMAL MG/DL
HCT VFR BLD AUTO: 40.8 % (ref 37–47)
HDLC SERPL-MCNC: 73 MG/DL (ref 40–60)
HGB BLD-MCNC: 13.2 G/DL (ref 12–16)
KETONES, POC: ABNORMAL MG/DL
LDLC SERPL CALC-MCNC: 94 MG/DL
LEUKOCYTE EST, POC: ABNORMAL
MCH RBC QN AUTO: 32.4 PG (ref 27–31)
MCHC RBC AUTO-ENTMCNC: 32.4 G/DL (ref 33–37)
MCV RBC AUTO: 100 FL (ref 81–99)
NITRITE, POC: ABNORMAL
PH, POC: 5
PLATELET # BLD AUTO: 194 K/UL (ref 130–400)
PMV BLD AUTO: 10.5 FL (ref 9.4–12.3)
POTASSIUM SERPL-SCNC: 4.3 MMOL/L (ref 3.5–5)
PROT SERPL-MCNC: 7.5 G/DL (ref 6.4–8.3)
PROTEIN, POC: ABNORMAL MG/DL
RBC # BLD AUTO: 4.08 M/UL (ref 4.2–5.4)
SODIUM SERPL-SCNC: 139 MMOL/L (ref 136–145)
SPECIFIC GRAVITY, POC: 1.02
TRIGL SERPL-MCNC: 79 MG/DL (ref 0–149)
TSH SERPL DL<=0.005 MIU/L-ACNC: 2.08 UIU/ML (ref 0.27–4.2)
UROBILINOGEN, POC: ABNORMAL MG/DL
WBC # BLD AUTO: 8.1 K/UL (ref 4.8–10.8)

## 2024-10-14 PROCEDURE — 81002 URINALYSIS NONAUTO W/O SCOPE: CPT | Performed by: FAMILY MEDICINE

## 2024-10-14 PROCEDURE — G0439 PPPS, SUBSEQ VISIT: HCPCS | Performed by: FAMILY MEDICINE

## 2024-10-14 PROCEDURE — G8427 DOCREV CUR MEDS BY ELIG CLIN: HCPCS | Performed by: FAMILY MEDICINE

## 2024-10-14 PROCEDURE — G8417 CALC BMI ABV UP PARAM F/U: HCPCS | Performed by: FAMILY MEDICINE

## 2024-10-14 PROCEDURE — 1090F PRES/ABSN URINE INCON ASSESS: CPT | Performed by: FAMILY MEDICINE

## 2024-10-14 PROCEDURE — 3078F DIAST BP <80 MM HG: CPT | Performed by: FAMILY MEDICINE

## 2024-10-14 PROCEDURE — 1036F TOBACCO NON-USER: CPT | Performed by: FAMILY MEDICINE

## 2024-10-14 PROCEDURE — 1123F ACP DISCUSS/DSCN MKR DOCD: CPT | Performed by: FAMILY MEDICINE

## 2024-10-14 PROCEDURE — G8399 PT W/DXA RESULTS DOCUMENT: HCPCS | Performed by: FAMILY MEDICINE

## 2024-10-14 PROCEDURE — G8484 FLU IMMUNIZE NO ADMIN: HCPCS | Performed by: FAMILY MEDICINE

## 2024-10-14 PROCEDURE — 99214 OFFICE O/P EST MOD 30 MIN: CPT | Performed by: FAMILY MEDICINE

## 2024-10-14 PROCEDURE — 3074F SYST BP LT 130 MM HG: CPT | Performed by: FAMILY MEDICINE

## 2024-10-14 RX ORDER — LISINOPRIL 10 MG/1
TABLET ORAL
Qty: 90 TABLET | Refills: 3 | Status: SHIPPED | OUTPATIENT
Start: 2024-10-14

## 2024-10-14 ASSESSMENT — ENCOUNTER SYMPTOMS
COUGH: 0
CHEST TIGHTNESS: 0
BACK PAIN: 1
SHORTNESS OF BREATH: 0
ABDOMINAL PAIN: 0

## 2024-10-14 NOTE — PROGRESS NOTES
Medicare Annual Wellness Visit    Margot Go is here for Medicare AW    Assessment & Plan   Encounter for screening mammogram for breast cancer  -     [facility] DIGITAL SCREEN W OR WO CAD BILATERAL; Future  -     POCT Urinalysis no Micro  Vitamin D deficiency  -     Vitamin D 25 Hydroxy  Acquired hypothyroidism  -     TSH with Reflex to FT4  Osteopenia, unspecified location  Essential hypertension  -     CBC  -     Comprehensive Metabolic Panel  Hypercholesterolemia  -     Lipid Panel  Medicare annual wellness visit, subsequent    Recommendations for Preventive Services Due: see orders and patient instructions/AVS.  Recommended screening schedule for the next 5-10 years is provided to the patient in written form: see Patient Instructions/AVS.     No follow-ups on file.     Subjective   Patient here today for MAW with AWV nurse.      Patient's complete Health Risk Assessment and screening values have been reviewed and are found in Flowsheets. The following problems were reviewed today and where indicated follow up appointments were made and/or referrals ordered.    Positive Risk Factor Screenings with Interventions:    Fall Risk:  Do you feel unsteady or are you worried about falling? : (!) yes (low back pain uses walker/cane)  2 or more falls in past year?: no  Fall with injury in past year?: (!) yes     Interventions:    Reviewed medications, home hazards, visual acuity, and co-morbidities that can increase risk for falls  See AVS for additional education material         Controlled Medication Review:    Today's Pain Level: No data recorded   Opioid Risk: (Low risk score <55) Opioid risk score: 12    Patient is low risk for opioid use disorder or overdose.    Last PDMP Anand as Reviewed:  Review User Review Instant Review Result   OLI SALINAS 10/15/2023  2:13 PM     Reviewed PDMP [1]     Last Controlled Substance Monitoring Documentation      Flowsheet Row Office Visit from 9/20/2022 in [facility] 
This encounter was performed under my, Mary Finley MD’s, direct supervision, 10/14/2024.    
yourself. This person is called a health care agent (health care proxy, health care surrogate). The form is also called a durable power of  for health care.  If you do not have an advance directive, decisions about your medical care may be made by a family member, or by a doctor or a  who doesn't know you.  It may help to think of an advance directive as a gift to the people who care for you. If you have one, they won't have to make tough decisions by themselves.  For more information, including forms for your state, see the [employer] website (www.caringinfo.org/planning/advance-directives/).  Follow-up care is a key part of your treatment and safety. Be sure to make and go to all appointments, and call your doctor if you are having problems. It's also a good idea to know your test results and keep a list of the medicines you take.  What should you include in an advance directive?  Many states have a unique advance directive form. (It may ask you to address specific issues.) Or you might use a universal form that's approved by many states.  If your form doesn't tell you what to address, it may be hard to know what to include in your advance directive. Use the questions below to help you get started.  Who do you want to make decisions about your medical care if you are not able to?  What life-support measures do you want if you have a serious illness that gets worse over time or can't be cured?  What are you most afraid of that might happen? (Maybe you're afraid of having pain, losing your independence, or being kept alive by machines.)  Where would you prefer to die? (Your home? A hospital? A nursing home?)  Do you want to donate your organs when you die?  Do you want certain Islam practices performed before you die?  When should you call for help?  Be sure to contact your doctor if you have any questions.  Where can you learn more?  Go to https://www.healthwise.net/patientEd and enter R264 to

## 2024-10-14 NOTE — PATIENT INSTRUCTIONS
We are committed to providing you with the best care possible.   In order to help us achieve these goals please remember to bring all medications, herbal products, and over the counter supplements with you to each visit.     If your provider has ordered testing for you, please be sure to follow up with our office if you have not received results within 7 days after the testing took place.     *If you receive a survey after visiting one of our offices, please take time to share your experience concerning your physician office visit. These surveys are confidential and no health information about you is shared.  We are eager to improve for you and we are counting on your feedback to help make that happen.     Wt Readings from Last 3 Encounters:   10/14/24 94 kg (207 lb 3.2 oz)   07/03/24 97.1 kg (214 lb)   03/27/24 98 kg (216 lb)      As you get older the ovaries really don't \"fall out\". They do get smaller in size but they are still present in the body and unfortunately, sometimes they can develop ovarian cancer. Even though we do a pelvic exam where we try to feel the ovaries and the uterus, the ovaries are very small after menopause and can be easily missed. Therefore, even if the doctor told you that they didn't feel anything, if you develop a change in bowel or bladder function, develop abdominal pain or bloating or develop other unusual symptoms, please call your doctor.            Preventing Falls: Care Instructions  Injuries and health problems such as trouble walking or poor eyesight can increase your risk of falling. So can some medicines. But there are things you can do to help prevent falls. You can exercise to get stronger. You can also arrange your home to make it safer.    Talk to your doctor about the medicines you take. Ask if any of them increase the risk of falls and whether they can be changed or stopped.   Try to exercise regularly. It can help improve your strength and balance. This can help lower

## 2024-10-16 LAB
BACTERIA UR CULT: ABNORMAL
BACTERIA UR CULT: ABNORMAL
ORGANISM: ABNORMAL

## 2024-12-04 ENCOUNTER — TELEPHONE (OUTPATIENT)
Dept: NEUROLOGY | Age: 79
End: 2024-12-04

## 2024-12-04 NOTE — TELEPHONE ENCOUNTER
Margot requests a call back please to discuss medication. Patient also requested an appt before the end of this year, but I did advise the patient that the provider is currently booked out until March of next year, she then said she really did not need to see him again, just needs clarification on some of her medication.     Thank you.

## 2024-12-10 DIAGNOSIS — M54.50 LOW BACK PAIN, UNSPECIFIED BACK PAIN LATERALITY, UNSPECIFIED CHRONICITY, UNSPECIFIED WHETHER SCIATICA PRESENT: ICD-10-CM

## 2024-12-10 RX ORDER — TRAMADOL HYDROCHLORIDE 50 MG/1
50 TABLET ORAL 4 TIMES DAILY PRN
Qty: 150 TABLET | Refills: 1 | Status: SHIPPED | OUTPATIENT
Start: 2024-12-10 | End: 2025-01-09

## 2024-12-10 NOTE — TELEPHONE ENCOUNTER
Margot is calling for a medication refill that is not listed in her chart. Tramodole 50mg, 150 count for a month.     Last office visit: 7/3/2024  Next office visit: 3/27/2025   Preferred Pharmacy: [facility]    Please call patient to clarify.     Thank You.

## 2024-12-10 NOTE — TELEPHONE ENCOUNTER
Margot Go has requested a refill on her medication.      Last office visit : 7/3/2024   Next office visit : 3/27/2025   Last medication refill : 10/7/24   Brent : up to date       Requested Prescriptions     Pending Prescriptions Disp Refills    traMADol (ULTRAM) 50 MG tablet 150 tablet 1     Sig: Take 1 tablet by mouth 4 times daily as needed for Pain for up to 30 days. Max Daily Amount: 200 mg

## 2025-02-27 DIAGNOSIS — M54.50 LOW BACK PAIN, UNSPECIFIED BACK PAIN LATERALITY, UNSPECIFIED CHRONICITY, UNSPECIFIED WHETHER SCIATICA PRESENT: ICD-10-CM

## 2025-02-27 RX ORDER — TRAMADOL HYDROCHLORIDE 50 MG/1
50 TABLET ORAL EVERY 6 HOURS PRN
Qty: 150 TABLET | Refills: 5 | Status: SHIPPED | OUTPATIENT
Start: 2025-02-27 | End: 2025-03-29

## 2025-02-27 NOTE — TELEPHONE ENCOUNTER
Margot Go has requested a refill on her medication.      Last office visit : 7/3/2024   Next office visit : 3/27/2025   Last medication refill : 1/26/25  Brent : up to date       Requested Prescriptions     Pending Prescriptions Disp Refills    traMADol (ULTRAM) 50 MG tablet [Pharmacy Med Name: TRAMADOL 50MG TABLETS] 150 tablet 1     Sig: Take 1 tablet by mouth every 6 hours as needed for Pain for up to 30 days. Max Daily Amount: 200 mg

## 2025-03-27 ENCOUNTER — OFFICE VISIT (OUTPATIENT)
Dept: NEUROLOGY | Age: 80
End: 2025-03-27
Payer: MEDICARE

## 2025-03-27 VITALS
HEIGHT: 69 IN | BODY MASS INDEX: 31.1 KG/M2 | DIASTOLIC BLOOD PRESSURE: 99 MMHG | WEIGHT: 210 LBS | SYSTOLIC BLOOD PRESSURE: 155 MMHG | HEART RATE: 73 BPM

## 2025-03-27 DIAGNOSIS — G62.9 POLYNEUROPATHY, UNSPECIFIED: ICD-10-CM

## 2025-03-27 DIAGNOSIS — G62.9 NEUROPATHY: ICD-10-CM

## 2025-03-27 DIAGNOSIS — M25.552 ARTHRALGIA OF LEFT HIP: ICD-10-CM

## 2025-03-27 DIAGNOSIS — M54.2 PAIN, NECK: ICD-10-CM

## 2025-03-27 DIAGNOSIS — G44.89 OTHER HEADACHE SYNDROME: ICD-10-CM

## 2025-03-27 DIAGNOSIS — E53.9 BURNING FEET SYNDROME: ICD-10-CM

## 2025-03-27 DIAGNOSIS — M25.511 ACUTE PAIN OF RIGHT SHOULDER: ICD-10-CM

## 2025-03-27 DIAGNOSIS — Z79.899 ON LONG TERM DRUG THERAPY: Primary | ICD-10-CM

## 2025-03-27 DIAGNOSIS — M54.50 LOW BACK PAIN, UNSPECIFIED BACK PAIN LATERALITY, UNSPECIFIED CHRONICITY, UNSPECIFIED WHETHER SCIATICA PRESENT: ICD-10-CM

## 2025-03-27 LAB
ALCOHOL URINE: NORMAL
AMPHETAMINE SCREEN URINE: NORMAL
BARBITURATE SCREEN URINE: NORMAL
BENZODIAZEPINE SCREEN, URINE: NORMAL
BUPRENORPHINE URINE: NORMAL
COCAINE METABOLITE SCREEN URINE: NORMAL
FENTANYL SCREEN, URINE: NORMAL
GABAPENTIN SCREEN, URINE: NORMAL
MDMA, URINE: NORMAL
METHADONE SCREEN, URINE: NORMAL
METHAMPHETAMINE, URINE: NORMAL
OPIATE SCREEN URINE: NORMAL
OXYCODONE SCREEN URINE: NORMAL
PHENCYCLIDINE SCREEN URINE: NORMAL
PROPOXYPHENE SCREEN, URINE: NORMAL
SYNTHETIC CANNABINOIDS(K2) SCREEN, URINE: NORMAL
THC SCREEN, URINE: NORMAL
TRAMADOL SCREEN URINE: NORMAL
TRICYCLIC ANTIDEPRESSANTS, UR: NORMAL

## 2025-03-27 PROCEDURE — 1159F MED LIST DOCD IN RCRD: CPT | Performed by: PSYCHIATRY & NEUROLOGY

## 2025-03-27 PROCEDURE — 1123F ACP DISCUSS/DSCN MKR DOCD: CPT | Performed by: PSYCHIATRY & NEUROLOGY

## 2025-03-27 PROCEDURE — 99214 OFFICE O/P EST MOD 30 MIN: CPT | Performed by: PSYCHIATRY & NEUROLOGY

## 2025-03-27 PROCEDURE — 3077F SYST BP >= 140 MM HG: CPT | Performed by: PSYCHIATRY & NEUROLOGY

## 2025-03-27 PROCEDURE — G8399 PT W/DXA RESULTS DOCUMENT: HCPCS | Performed by: PSYCHIATRY & NEUROLOGY

## 2025-03-27 PROCEDURE — G8427 DOCREV CUR MEDS BY ELIG CLIN: HCPCS | Performed by: PSYCHIATRY & NEUROLOGY

## 2025-03-27 PROCEDURE — G8417 CALC BMI ABV UP PARAM F/U: HCPCS | Performed by: PSYCHIATRY & NEUROLOGY

## 2025-03-27 PROCEDURE — 3080F DIAST BP >= 90 MM HG: CPT | Performed by: PSYCHIATRY & NEUROLOGY

## 2025-03-27 PROCEDURE — 80305 DRUG TEST PRSMV DIR OPT OBS: CPT | Performed by: PSYCHIATRY & NEUROLOGY

## 2025-03-27 PROCEDURE — 1090F PRES/ABSN URINE INCON ASSESS: CPT | Performed by: PSYCHIATRY & NEUROLOGY

## 2025-03-27 PROCEDURE — 1036F TOBACCO NON-USER: CPT | Performed by: PSYCHIATRY & NEUROLOGY

## 2025-03-27 RX ORDER — TRAMADOL HYDROCHLORIDE 50 MG/1
50 TABLET ORAL EVERY 4 HOURS PRN
Qty: 150 TABLET | Refills: 5 | Status: SHIPPED | OUTPATIENT
Start: 2025-03-27 | End: 2025-04-26

## 2025-03-27 NOTE — PROGRESS NOTES
Chief Complaint   Patient presents with    Lower Back Pain       Margot Go is a 79 y.o. year old female who is seen for evaluation of burning feet. This has been present for around 2015 or so. It is typically worse at night when she lays down. She sometimes feels that the nerve endings to her body are not normal. She does have some chronic neck pain issues and some occasional low back pain issues. She denies any weakness. She indicates her mother and diabetes and neuropathy. She denies any exposure to chemicals, toxins, or pesticides. She has tried Cymbalta or amitriptyline in the past. Tramadol does help. Stretching helps. Did not tolerate trileptal. Going to chiropractor helps neck and back. Gabapentin made her sick. Tramadol still helps only one at night..Cut back on sugar and going to chiropractor. . Itching flared up and now better. Not taking Klonopin. Tramadol at night helps. Wakes up quite a bit at night. March 6 th , 2021 left leg gave out and fell. Cracked right arm. Up to 3 Tramadol now and this helps her back as well. Seeing Dr Carreno for low back pain. Going to pain management. Getting shot in back with Dr De Jesus. Doing better with Tramadol 4 a day.Some low back pain. Fell previously and hit shoulder and ribs.Fractured 3 ribs on right. More low back pain. Given Toradol but raised blood pressure. Going to pain management. Did not want to go see orthopedics. Doing better.     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

## 2025-04-03 ENCOUNTER — TELEPHONE (OUTPATIENT)
Dept: NEUROSURGERY | Age: 80
End: 2025-04-03

## 2025-04-03 NOTE — TELEPHONE ENCOUNTER
Received np referral for established Dr Carreno patient. Last seen 3 years ago. MRI L completed 4/22/24. LVM for patient to call back if appt time and date does not work. Notified via [employer] as well.

## 2025-04-22 ENCOUNTER — OFFICE VISIT (OUTPATIENT)
Dept: NEUROSURGERY | Age: 80
End: 2025-04-22
Payer: MEDICARE

## 2025-04-22 ENCOUNTER — HOSPITAL ENCOUNTER (OUTPATIENT)
Dept: GENERAL RADIOLOGY | Age: 80
Discharge: HOME | End: 2025-04-22
Payer: MEDICARE

## 2025-04-22 VITALS
BODY MASS INDEX: 31.12 KG/M2 | DIASTOLIC BLOOD PRESSURE: 80 MMHG | WEIGHT: 210.1 LBS | SYSTOLIC BLOOD PRESSURE: 130 MMHG | HEART RATE: 77 BPM | HEIGHT: 69 IN

## 2025-04-22 DIAGNOSIS — M16.12 PRIMARY OSTEOARTHRITIS OF LEFT HIP: ICD-10-CM

## 2025-04-22 DIAGNOSIS — M43.16 SPONDYLOLISTHESIS, LUMBAR REGION: ICD-10-CM

## 2025-04-22 DIAGNOSIS — M16.12 PRIMARY OSTEOARTHRITIS OF LEFT HIP: Primary | ICD-10-CM

## 2025-04-22 PROCEDURE — 3079F DIAST BP 80-89 MM HG: CPT | Performed by: NEUROLOGICAL SURGERY

## 2025-04-22 PROCEDURE — G8399 PT W/DXA RESULTS DOCUMENT: HCPCS | Performed by: NEUROLOGICAL SURGERY

## 2025-04-22 PROCEDURE — 72110 X-RAY EXAM L-2 SPINE 4/>VWS: CPT | Performed by: ORTHOPAEDIC SURGERY

## 2025-04-22 PROCEDURE — 73502 X-RAY EXAM HIP UNI 2-3 VIEWS: CPT

## 2025-04-22 PROCEDURE — G8427 DOCREV CUR MEDS BY ELIG CLIN: HCPCS | Performed by: NEUROLOGICAL SURGERY

## 2025-04-22 PROCEDURE — 1159F MED LIST DOCD IN RCRD: CPT | Performed by: NEUROLOGICAL SURGERY

## 2025-04-22 PROCEDURE — 3075F SYST BP GE 130 - 139MM HG: CPT | Performed by: NEUROLOGICAL SURGERY

## 2025-04-22 PROCEDURE — 1036F TOBACCO NON-USER: CPT | Performed by: NEUROLOGICAL SURGERY

## 2025-04-22 PROCEDURE — 72110 X-RAY EXAM L-2 SPINE 4/>VWS: CPT

## 2025-04-22 PROCEDURE — 1123F ACP DISCUSS/DSCN MKR DOCD: CPT | Performed by: NEUROLOGICAL SURGERY

## 2025-04-22 PROCEDURE — 99214 OFFICE O/P EST MOD 30 MIN: CPT | Performed by: NEUROLOGICAL SURGERY

## 2025-04-22 PROCEDURE — G8417 CALC BMI ABV UP PARAM F/U: HCPCS | Performed by: NEUROLOGICAL SURGERY

## 2025-04-22 PROCEDURE — 1090F PRES/ABSN URINE INCON ASSESS: CPT | Performed by: NEUROLOGICAL SURGERY

## 2025-04-22 ASSESSMENT — ENCOUNTER SYMPTOMS
GASTROINTESTINAL NEGATIVE: 1
BACK PAIN: 1
EYES NEGATIVE: 1
RESPIRATORY NEGATIVE: 1

## 2025-04-22 NOTE — PROGRESS NOTES
Review of Systems   Constitutional: Negative.    HENT: Negative.     Eyes: Negative.    Respiratory: Negative.     Cardiovascular: Negative.    Gastrointestinal: Negative.    Genitourinary: Negative.    Musculoskeletal:  Positive for back pain, joint pain and myalgias.   Skin: Negative.    Neurological:  Positive for tingling and weakness.   Endo/Heme/Allergies: Negative.    Psychiatric/Behavioral: Negative.        
and has been on her feet all day.  She states this has significantly worsened her pain.  She has seen Dr. Kumar in the past for peripheral neuropathy and has been prescribed tramadol.  She also tried gabapentin but did not tolerate this due to nausea.     Currently, she describes pain in her lower back that will radiate to her left buttock, lateral and anterior thigh.  The pain does not radiate below the knee.  She notes significant worsening of her back and leg pain with activity.  When she tries to walk, she notes a significant worsening of her back and leg pain and is forced to walk with flexed posture.  She notes the pain does improve somewhat if she leans over a shopping cart.     She is currently doing an exercise regimen from her chiropractor but she has never done formal physical therapy, though she has been referred by Dr. Finley.  She has no prior history of back surgery and she has not seen pain management or had injections.  She has seen neurosurgery in the remote past for cervical radiculopathy and this was managed conservatively.    ROS:    Constitutional: Negative.    HENT: Negative.     Eyes: Negative.    Respiratory: Negative.     Cardiovascular: Negative.    Gastrointestinal: Negative.    Genitourinary: Negative.    Musculoskeletal:  Positive for back pain, joint pain and myalgias.   Skin: Negative.    Neurological:  Positive for tingling and weakness.   Endo/Heme/Allergies: Negative.    Psychiatric/Behavioral: Negative.       Review of systems was obtained by the medical assistant and reviewed by myself.      Objective:    /80   Pulse 77   Ht 1.753 m (5' 9.02\")   Wt 95.3 kg (210 lb 1.6 oz)   BMI 31.01 kg/m²         Physical Exam:    General: alert, cooperative, no distress  Cardiorespiratory: unlabored breathing        Neurologic Exam:    Mental Status: Alert, oriented, thought content appropriate  Cranial Nerves: PERRL, EOMI, symmetric facies, tongue midline  Motor: Motor exam is

## 2025-04-23 ENCOUNTER — OFFICE VISIT (OUTPATIENT)
Age: 80
End: 2025-04-23
Payer: MEDICARE

## 2025-04-23 VITALS — HEIGHT: 69 IN | BODY MASS INDEX: 30.96 KG/M2 | WEIGHT: 209 LBS

## 2025-04-23 DIAGNOSIS — M16.12 PRIMARY OSTEOARTHRITIS OF LEFT HIP: Primary | ICD-10-CM

## 2025-04-23 PROCEDURE — 1123F ACP DISCUSS/DSCN MKR DOCD: CPT | Performed by: ORTHOPAEDIC SURGERY

## 2025-04-23 PROCEDURE — 1090F PRES/ABSN URINE INCON ASSESS: CPT | Performed by: ORTHOPAEDIC SURGERY

## 2025-04-23 PROCEDURE — G8417 CALC BMI ABV UP PARAM F/U: HCPCS | Performed by: ORTHOPAEDIC SURGERY

## 2025-04-23 PROCEDURE — 1159F MED LIST DOCD IN RCRD: CPT | Performed by: ORTHOPAEDIC SURGERY

## 2025-04-23 PROCEDURE — 1036F TOBACCO NON-USER: CPT | Performed by: ORTHOPAEDIC SURGERY

## 2025-04-23 PROCEDURE — G8427 DOCREV CUR MEDS BY ELIG CLIN: HCPCS | Performed by: ORTHOPAEDIC SURGERY

## 2025-04-23 PROCEDURE — 99214 OFFICE O/P EST MOD 30 MIN: CPT | Performed by: ORTHOPAEDIC SURGERY

## 2025-04-23 PROCEDURE — G8399 PT W/DXA RESULTS DOCUMENT: HCPCS | Performed by: ORTHOPAEDIC SURGERY

## 2025-04-23 RX ORDER — METHYLPREDNISOLONE 4 MG/1
TABLET ORAL
Qty: 21 TABLET | Refills: 0 | Status: SHIPPED | OUTPATIENT
Start: 2025-04-23

## 2025-04-23 ASSESSMENT — PROMIS GLOBAL HEALTH SCALE
IN THE PAST 7 DAYS, HOW OFTEN HAVE YOU BEEN BOTHERED BY EMOTIONAL PROBLEMS, SUCH AS FEELING ANXIOUS, DEPRESSED, OR IRRITABLE [ON A SCALE FROM 1 (NEVER) TO 5 (ALWAYS)]?: NEVER
IN GENERAL, WOULD YOU SAY YOUR QUALITY OF LIFE IS...[ON A SCALE OF 1 (POOR) TO 5 (EXCELLENT)]: FAIR
SUM OF RESPONSES TO QUESTIONS 2, 4, 5, & 10: 16
IN GENERAL, WOULD YOU SAY YOUR HEALTH IS...[ON A SCALE OF 1 (POOR) TO 5 (EXCELLENT)]: VERY GOOD
IN GENERAL, HOW WOULD YOU RATE YOUR SATISFACTION WITH YOUR SOCIAL ACTIVITIES AND RELATIONSHIPS [ON A SCALE OF 1 (POOR) TO 5 (EXCELLENT)]?: EXCELLENT
IN THE PAST 7 DAYS, HOW WOULD YOU RATE YOUR FATIGUE ON AVERAGE [ON A SCALE FROM 1 (NONE) TO 5 (VERY SEVERE)]?: MILD
IN GENERAL, HOW WOULD YOU RATE YOUR MENTAL HEALTH, INCLUDING YOUR MOOD AND YOUR ABILITY TO THINK [ON A SCALE OF 1 (POOR) TO 5 (EXCELLENT)]?: VERY GOOD
IN THE PAST 7 DAYS, HOW WOULD YOU RATE YOUR PAIN ON AVERAGE [ON A SCALE FROM 0 (NO PAIN) TO 10 (WORST IMAGINABLE PAIN)]?: 7
SUM OF RESPONSES TO QUESTIONS 3, 6, 7, & 8: 17
WHO IS THE PERSON COMPLETING THE PROMIS V1.1 SURVEY?: SELF
HOW IS THE PROMIS V1.1 BEING ADMINISTERED?: PAPER
IN GENERAL, PLEASE RATE HOW WELL YOU CARRY OUT YOUR USUAL SOCIAL ACTIVITIES (INCLUDES ACTIVITIES AT HOME, AT WORK, AND IN YOUR COMMUNITY, AND RESPONSIBILITIES AS A PARENT, CHILD, SPOUSE, EMPLOYEE, FRIEND, ETC) [ON A SCALE OF 1 (POOR) TO 5 (EXCELLENT)]?: FAIR
TO WHAT EXTENT ARE YOU ABLE TO CARRY OUT YOUR EVERYDAY PHYSICAL ACTIVITIES SUCH AS WALKING, CLIMBING STAIRS, CARRYING GROCERIES, OR MOVING A CHAIR [ON A SCALE OF 1 (NOT AT ALL) TO 5 (COMPLETELY)]?: A LITTLE
IN GENERAL, HOW WOULD YOU RATE YOUR PHYSICAL HEALTH [ON A SCALE OF 1 (POOR) TO 5 (EXCELLENT)]?: VERY GOOD

## 2025-04-23 ASSESSMENT — HOOS JR
BENDING TO THE FLOOR TO PICK UP OBJECT: SEVERE
RISING FROM SITTING: MODERATE
HOOS JR RAW SCORE: 10
HOOS JR TOTAL INTERVAL SCORE: 58.93
WALKING ON UNEVEN SURFACE: SEVERE
HOOS JR RAW SCORE: 10
LYING IN BED (TURNING OVER, MAINTAINING HIP POSITION): MODERATE

## 2025-04-23 NOTE — PROGRESS NOTES
and vitamin D. No history of heart attack, stroke, smoking, or diabetes is reported. A scheduled trip to [place] on 05/24/2025 is mentioned, and she is considering a hip injection to alleviate pain during the trip. A steroid pack was prescribed during her last visit.    SOCIAL HISTORY  Marital Status:   Tobacco: Does not smoke                Medications  Current Outpatient Medications   Medication Sig Dispense Refill    traMADol (ULTRAM) 50 MG tablet Take 1 tablet by mouth every 4 hours as needed for Pain for up to 30 days. Max Daily Amount: 300 mg 150 tablet 5    lisinopril (PRINIVIL;ZESTRIL) 10 MG tablet TAKE 1 TABLET DAILY FOR HIGH BLOOD PRESSURE 90 tablet 3    levothyroxine (SYNTHROID) 50 MCG tablet TAKE 1 TABLET DAILY FOR LOW THYROID 90 tablet 3    pantoprazole (PROTONIX) 40 MG tablet TAKE 1 TABLET DAILY FOR STOMACH 90 tablet 3    Vitamin D (CHOLECALCIFEROL) 1000 UNITS CAPS capsule Take 1 capsule by mouth daily       No current facility-administered medications for this visit.        Allergies  Allergies   Allergen Reactions    Neurontin [Gabapentin] Nausea Only        Review of Systems  System  Neg/Pos  Details  Constitutional  Negative  Chills, Fatigue, Fever and Night Sweats  Respiratory  Negative  Chest Pain, Cough and Dyspnea  Cardio   Negative  Leg Swelling  GI   Negative  Abdominal Pain, Constipation, Nausea and Vomiting     Negative  Urinary Incontinence   Endocrine  Negative  Weight Gain and Weight Loss  MS   Negative  Except as noted in HPI and Chief Complaint      Objective:   White (non-)   Ht Readings from Last 1 Encounters:   04/23/25 1.753 m (5' 9\")    .[id number][14  Body mass index is 30.86 kg/m².     Physical exam: Left hip is very stiff and painful.  Walks with a severe limp on the left side.  Back is also stiff.  She leans to the right walking on the walker.  Neurovascular exam normal left lower extremity.        Xray: No x-rays taken today  X-rays from yesterday show lumbar

## 2025-05-08 ENCOUNTER — PREP FOR PROCEDURE (OUTPATIENT)
Age: 80
End: 2025-05-08

## 2025-05-08 DIAGNOSIS — Z00.00 PREVENTATIVE HEALTH CARE: ICD-10-CM

## 2025-05-08 DIAGNOSIS — M16.12 PRIMARY OSTEOARTHRITIS OF LEFT HIP: ICD-10-CM

## 2025-05-08 DIAGNOSIS — T40.2X1A OPIOID OVERDOSE, ACCIDENTAL OR UNINTENTIONAL, INITIAL ENCOUNTER (HCC): Primary | ICD-10-CM

## 2025-05-08 RX ORDER — SCOPOLAMINE 1 MG/3D
1 PATCH, EXTENDED RELEASE TRANSDERMAL ONCE
Status: CANCELLED | OUTPATIENT
Start: 2025-06-19 | End: 2025-06-19

## 2025-05-08 RX ORDER — SODIUM CHLORIDE 0.9 % (FLUSH) 0.9 %
5-40 SYRINGE (ML) INJECTION PRN
Status: CANCELLED | OUTPATIENT
Start: 2025-06-19

## 2025-05-08 RX ORDER — CELECOXIB 100 MG/1
200 CAPSULE ORAL ONCE
Status: CANCELLED | OUTPATIENT
Start: 2025-06-19 | End: 2025-06-19

## 2025-05-08 RX ORDER — OXYCODONE HCL 10 MG/1
10 TABLET, FILM COATED, EXTENDED RELEASE ORAL ONCE
Status: CANCELLED | OUTPATIENT
Start: 2025-06-19 | End: 2025-06-19

## 2025-05-08 RX ORDER — ACETAMINOPHEN 325 MG/1
1000 TABLET ORAL ONCE
Status: CANCELLED | OUTPATIENT
Start: 2025-06-19 | End: 2025-06-19

## 2025-05-08 RX ORDER — TRANEXAMIC ACID 650 MG/1
1950 TABLET ORAL ONCE
Status: CANCELLED | OUTPATIENT
Start: 2025-06-19 | End: 2025-06-19

## 2025-05-08 RX ORDER — SODIUM CHLORIDE 0.9 % (FLUSH) 0.9 %
5-40 SYRINGE (ML) INJECTION EVERY 12 HOURS SCHEDULED
Status: CANCELLED | OUTPATIENT
Start: 2025-06-19

## 2025-05-08 RX ORDER — SODIUM CHLORIDE 9 MG/ML
INJECTION, SOLUTION INTRAVENOUS PRN
Status: CANCELLED | OUTPATIENT
Start: 2025-06-19

## 2025-05-12 RX ORDER — MUPIROCIN 20 MG/G
OINTMENT TOPICAL
Qty: 30 G | Refills: 0 | Status: SHIPPED | OUTPATIENT
Start: 2025-05-12

## 2025-05-29 ENCOUNTER — TELEPHONE (OUTPATIENT)
Dept: NEUROSURGERY | Age: 80
End: 2025-05-29

## 2025-05-29 NOTE — TELEPHONE ENCOUNTER
M for patient son to see if patient still wants to come to appt with Dr Carreno due to having hip surgery coming up. Advised him to call back.

## 2025-06-10 ENCOUNTER — TELEPHONE (OUTPATIENT)
Age: 80
End: 2025-06-10

## 2025-06-10 NOTE — TELEPHONE ENCOUNTER
Pt is calling in about after care instructions such as physical therapy   Please call pt back regarding this

## 2025-06-19 ENCOUNTER — APPOINTMENT (OUTPATIENT)
Dept: GENERAL RADIOLOGY | Age: 80
End: 2025-06-19
Attending: ORTHOPAEDIC SURGERY
Payer: MEDICARE

## 2025-06-19 ENCOUNTER — ANESTHESIA (OUTPATIENT)
Dept: OPERATING ROOM | Age: 80
End: 2025-06-19
Payer: MEDICARE

## 2025-06-19 ENCOUNTER — HOSPITAL ENCOUNTER (OUTPATIENT)
Age: 80
Setting detail: OBSERVATION
Discharge: HOME HEALTH | End: 2025-06-20
Attending: ORTHOPAEDIC SURGERY | Admitting: ORTHOPAEDIC SURGERY
Payer: MEDICARE

## 2025-06-19 ENCOUNTER — ANESTHESIA EVENT (OUTPATIENT)
Dept: OPERATING ROOM | Age: 80
End: 2025-06-19
Payer: MEDICARE

## 2025-06-19 DIAGNOSIS — Z96.642 S/P TOTAL LEFT HIP ARTHROPLASTY: Primary | ICD-10-CM

## 2025-06-19 LAB
ABO/RH: NORMAL
ANTIBODY SCREEN: NORMAL

## 2025-06-19 PROCEDURE — 3700000001 HC ADD 15 MINUTES (ANESTHESIA): Performed by: ORTHOPAEDIC SURGERY

## 2025-06-19 PROCEDURE — 6360000002 HC RX W HCPCS: Performed by: ORTHOPAEDIC SURGERY

## 2025-06-19 PROCEDURE — 2500000003 HC RX 250 WO HCPCS: Performed by: ORTHOPAEDIC SURGERY

## 2025-06-19 PROCEDURE — 94760 N-INVAS EAR/PLS OXIMETRY 1: CPT

## 2025-06-19 PROCEDURE — 6370000000 HC RX 637 (ALT 250 FOR IP): Performed by: ORTHOPAEDIC SURGERY

## 2025-06-19 PROCEDURE — 2709999900 HC NON-CHARGEABLE SUPPLY: Performed by: ORTHOPAEDIC SURGERY

## 2025-06-19 PROCEDURE — 2500000003 HC RX 250 WO HCPCS: Performed by: NURSE ANESTHETIST, CERTIFIED REGISTERED

## 2025-06-19 PROCEDURE — 6360000002 HC RX W HCPCS: Performed by: NURSE ANESTHETIST, CERTIFIED REGISTERED

## 2025-06-19 PROCEDURE — 6370000000 HC RX 637 (ALT 250 FOR IP)

## 2025-06-19 PROCEDURE — G0378 HOSPITAL OBSERVATION PER HR: HCPCS

## 2025-06-19 PROCEDURE — 7100000001 HC PACU RECOVERY - ADDTL 15 MIN: Performed by: ORTHOPAEDIC SURGERY

## 2025-06-19 PROCEDURE — 3600000005 HC SURGERY LEVEL 5 BASE: Performed by: ORTHOPAEDIC SURGERY

## 2025-06-19 PROCEDURE — 94150 VITAL CAPACITY TEST: CPT

## 2025-06-19 PROCEDURE — 2580000003 HC RX 258: Performed by: ANESTHESIOLOGY

## 2025-06-19 PROCEDURE — 7100000000 HC PACU RECOVERY - FIRST 15 MIN: Performed by: ORTHOPAEDIC SURGERY

## 2025-06-19 PROCEDURE — C1776 JOINT DEVICE (IMPLANTABLE): HCPCS | Performed by: ORTHOPAEDIC SURGERY

## 2025-06-19 PROCEDURE — 3700000000 HC ANESTHESIA ATTENDED CARE: Performed by: ORTHOPAEDIC SURGERY

## 2025-06-19 PROCEDURE — 3600000015 HC SURGERY LEVEL 5 ADDTL 15MIN: Performed by: ORTHOPAEDIC SURGERY

## 2025-06-19 PROCEDURE — 86850 RBC ANTIBODY SCREEN: CPT

## 2025-06-19 PROCEDURE — 2720000010 HC SURG SUPPLY STERILE: Performed by: ORTHOPAEDIC SURGERY

## 2025-06-19 PROCEDURE — 86901 BLOOD TYPING SEROLOGIC RH(D): CPT

## 2025-06-19 PROCEDURE — 73502 X-RAY EXAM HIP UNI 2-3 VIEWS: CPT

## 2025-06-19 PROCEDURE — 86900 BLOOD TYPING SEROLOGIC ABO: CPT

## 2025-06-19 PROCEDURE — 36415 COLL VENOUS BLD VENIPUNCTURE: CPT

## 2025-06-19 PROCEDURE — 2580000003 HC RX 258: Performed by: NURSE ANESTHETIST, CERTIFIED REGISTERED

## 2025-06-19 PROCEDURE — 27130 TOTAL HIP ARTHROPLASTY: CPT | Performed by: ORTHOPAEDIC SURGERY

## 2025-06-19 DEVICE — EMPOWR ACETABULAR SYSTEM, LINER, NEUTRAL, HXE+, 40H
Type: IMPLANTABLE DEVICE | Site: HIP | Status: FUNCTIONAL
Brand: DJO SURGICAL

## 2025-06-19 DEVICE — HEAD, FEMORAL, CERAMIC, BILOX DELTA, 40MM NEUTRAL
Type: IMPLANTABLE DEVICE | Site: HIP | Status: FUNCTIONAL
Brand: DJO SURGICAL

## 2025-06-19 DEVICE — IMPLANTABLE DEVICE
Type: IMPLANTABLE DEVICE | Site: HIP | Status: FUNCTIONAL
Brand: ORIGIN

## 2025-06-19 DEVICE — EMPOWR ACET SYSTEM, CUP, HEMISPHERICAL, CLUSTER HOLE, 56MM
Type: IMPLANTABLE DEVICE | Site: HIP | Status: FUNCTIONAL
Brand: DJO SURGICAL

## 2025-06-19 RX ORDER — SODIUM CHLORIDE 0.9 % (FLUSH) 0.9 %
5-40 SYRINGE (ML) INJECTION EVERY 12 HOURS SCHEDULED
Status: DISCONTINUED | OUTPATIENT
Start: 2025-06-19 | End: 2025-06-19 | Stop reason: HOSPADM

## 2025-06-19 RX ORDER — FENTANYL CITRATE 50 UG/ML
INJECTION, SOLUTION INTRAMUSCULAR; INTRAVENOUS
Status: DISCONTINUED | OUTPATIENT
Start: 2025-06-19 | End: 2025-06-19 | Stop reason: SDUPTHER

## 2025-06-19 RX ORDER — SCOPOLAMINE 1 MG/3D
1 PATCH, EXTENDED RELEASE TRANSDERMAL ONCE
Status: DISCONTINUED | OUTPATIENT
Start: 2025-06-19 | End: 2025-06-19 | Stop reason: HOSPADM

## 2025-06-19 RX ORDER — HYDROMORPHONE HYDROCHLORIDE 1 MG/ML
0.5 INJECTION, SOLUTION INTRAMUSCULAR; INTRAVENOUS; SUBCUTANEOUS EVERY 5 MIN PRN
Status: COMPLETED | OUTPATIENT
Start: 2025-06-19 | End: 2025-06-19

## 2025-06-19 RX ORDER — LIDOCAINE HYDROCHLORIDE 10 MG/ML
INJECTION, SOLUTION INFILTRATION; PERINEURAL
Status: DISCONTINUED | OUTPATIENT
Start: 2025-06-19 | End: 2025-06-19 | Stop reason: SDUPTHER

## 2025-06-19 RX ORDER — SODIUM CHLORIDE 0.9 % (FLUSH) 0.9 %
5-40 SYRINGE (ML) INJECTION PRN
Status: DISCONTINUED | OUTPATIENT
Start: 2025-06-19 | End: 2025-06-19 | Stop reason: HOSPADM

## 2025-06-19 RX ORDER — CELECOXIB 200 MG/1
200 CAPSULE ORAL ONCE
Status: COMPLETED | OUTPATIENT
Start: 2025-06-19 | End: 2025-06-19

## 2025-06-19 RX ORDER — MORPHINE SULFATE 4 MG/ML
4 INJECTION, SOLUTION INTRAMUSCULAR; INTRAVENOUS
Status: DISCONTINUED | OUTPATIENT
Start: 2025-06-19 | End: 2025-06-20 | Stop reason: HOSPADM

## 2025-06-19 RX ORDER — ROCURONIUM BROMIDE 10 MG/ML
INJECTION, SOLUTION INTRAVENOUS
Status: DISCONTINUED | OUTPATIENT
Start: 2025-06-19 | End: 2025-06-19 | Stop reason: SDUPTHER

## 2025-06-19 RX ORDER — LEVOTHYROXINE SODIUM 50 UG/1
50 TABLET ORAL DAILY
Status: DISCONTINUED | OUTPATIENT
Start: 2025-06-19 | End: 2025-06-20 | Stop reason: HOSPADM

## 2025-06-19 RX ORDER — MORPHINE SULFATE 2 MG/ML
2 INJECTION, SOLUTION INTRAMUSCULAR; INTRAVENOUS
Status: DISCONTINUED | OUTPATIENT
Start: 2025-06-19 | End: 2025-06-20 | Stop reason: HOSPADM

## 2025-06-19 RX ORDER — DEXAMETHASONE SODIUM PHOSPHATE 10 MG/ML
10 INJECTION, SOLUTION INTRAMUSCULAR; INTRAVENOUS ONCE
Status: DISCONTINUED | OUTPATIENT
Start: 2025-06-19 | End: 2025-06-19 | Stop reason: HOSPADM

## 2025-06-19 RX ORDER — OXYCODONE HCL 10 MG/1
10 TABLET, FILM COATED, EXTENDED RELEASE ORAL ONCE
Refills: 0 | Status: COMPLETED | OUTPATIENT
Start: 2025-06-19 | End: 2025-06-19

## 2025-06-19 RX ORDER — PANTOPRAZOLE SODIUM 40 MG/1
40 TABLET, DELAYED RELEASE ORAL DAILY
Status: DISCONTINUED | OUTPATIENT
Start: 2025-06-19 | End: 2025-06-20 | Stop reason: HOSPADM

## 2025-06-19 RX ORDER — SENNOSIDES 8.6 MG
325 CAPSULE ORAL 2 TIMES DAILY
Status: DISCONTINUED | OUTPATIENT
Start: 2025-06-19 | End: 2025-06-20 | Stop reason: HOSPADM

## 2025-06-19 RX ORDER — TRANEXAMIC ACID 650 MG/1
1950 TABLET ORAL ONCE
Status: COMPLETED | OUTPATIENT
Start: 2025-06-19 | End: 2025-06-19

## 2025-06-19 RX ORDER — EPHEDRINE SULFATE/0.9% NACL/PF 25 MG/5 ML
SYRINGE (ML) INTRAVENOUS
Status: DISCONTINUED | OUTPATIENT
Start: 2025-06-19 | End: 2025-06-19 | Stop reason: SDUPTHER

## 2025-06-19 RX ORDER — VITAMIN B COMPLEX
1000 TABLET ORAL DAILY
Status: DISCONTINUED | OUTPATIENT
Start: 2025-06-19 | End: 2025-06-20 | Stop reason: HOSPADM

## 2025-06-19 RX ORDER — DEXAMETHASONE SODIUM PHOSPHATE 10 MG/ML
INJECTION, SOLUTION INTRAMUSCULAR; INTRAVENOUS
Status: DISCONTINUED | OUTPATIENT
Start: 2025-06-19 | End: 2025-06-19 | Stop reason: SDUPTHER

## 2025-06-19 RX ORDER — SODIUM CHLORIDE 9 MG/ML
INJECTION, SOLUTION INTRAVENOUS PRN
Status: DISCONTINUED | OUTPATIENT
Start: 2025-06-19 | End: 2025-06-19 | Stop reason: HOSPADM

## 2025-06-19 RX ORDER — LABETALOL 20 MG/4 ML (5 MG/ML) INTRAVENOUS SYRINGE
Status: DISCONTINUED | OUTPATIENT
Start: 2025-06-19 | End: 2025-06-19 | Stop reason: SDUPTHER

## 2025-06-19 RX ORDER — SODIUM CHLORIDE 0.9 % (FLUSH) 0.9 %
5-40 SYRINGE (ML) INJECTION EVERY 12 HOURS SCHEDULED
Status: DISCONTINUED | OUTPATIENT
Start: 2025-06-19 | End: 2025-06-20 | Stop reason: HOSPADM

## 2025-06-19 RX ORDER — ONDANSETRON 2 MG/ML
4 INJECTION INTRAMUSCULAR; INTRAVENOUS
Status: DISCONTINUED | OUTPATIENT
Start: 2025-06-19 | End: 2025-06-19 | Stop reason: HOSPADM

## 2025-06-19 RX ORDER — POLYETHYLENE GLYCOL 3350 17 G/17G
17 POWDER, FOR SOLUTION ORAL DAILY
Status: DISCONTINUED | OUTPATIENT
Start: 2025-06-20 | End: 2025-06-20 | Stop reason: HOSPADM

## 2025-06-19 RX ORDER — OXYCODONE HYDROCHLORIDE 5 MG/1
5 TABLET ORAL EVERY 4 HOURS PRN
Status: DISCONTINUED | OUTPATIENT
Start: 2025-06-19 | End: 2025-06-20 | Stop reason: HOSPADM

## 2025-06-19 RX ORDER — ONDANSETRON 2 MG/ML
INJECTION INTRAMUSCULAR; INTRAVENOUS
Status: DISCONTINUED | OUTPATIENT
Start: 2025-06-19 | End: 2025-06-19 | Stop reason: SDUPTHER

## 2025-06-19 RX ORDER — ACETAMINOPHEN 325 MG/1
650 TABLET ORAL EVERY 6 HOURS
Status: DISCONTINUED | OUTPATIENT
Start: 2025-06-19 | End: 2025-06-20 | Stop reason: HOSPADM

## 2025-06-19 RX ORDER — HYDROMORPHONE HYDROCHLORIDE 1 MG/ML
0.25 INJECTION, SOLUTION INTRAMUSCULAR; INTRAVENOUS; SUBCUTANEOUS EVERY 5 MIN PRN
Status: DISCONTINUED | OUTPATIENT
Start: 2025-06-19 | End: 2025-06-19 | Stop reason: HOSPADM

## 2025-06-19 RX ORDER — PROPOFOL 10 MG/ML
INJECTION, EMULSION INTRAVENOUS
Status: DISCONTINUED | OUTPATIENT
Start: 2025-06-19 | End: 2025-06-19 | Stop reason: SDUPTHER

## 2025-06-19 RX ORDER — SODIUM CHLORIDE, SODIUM LACTATE, POTASSIUM CHLORIDE, CALCIUM CHLORIDE 600; 310; 30; 20 MG/100ML; MG/100ML; MG/100ML; MG/100ML
INJECTION, SOLUTION INTRAVENOUS CONTINUOUS
Status: DISCONTINUED | OUTPATIENT
Start: 2025-06-19 | End: 2025-06-19 | Stop reason: HOSPADM

## 2025-06-19 RX ORDER — SODIUM CHLORIDE, SODIUM LACTATE, POTASSIUM CHLORIDE, CALCIUM CHLORIDE 600; 310; 30; 20 MG/100ML; MG/100ML; MG/100ML; MG/100ML
INJECTION, SOLUTION INTRAVENOUS
Status: DISCONTINUED | OUTPATIENT
Start: 2025-06-19 | End: 2025-06-19 | Stop reason: SDUPTHER

## 2025-06-19 RX ORDER — ROPIVACAINE HYDROCHLORIDE 2 MG/ML
INJECTION, SOLUTION EPIDURAL; INFILTRATION; PERINEURAL PRN
Status: DISCONTINUED | OUTPATIENT
Start: 2025-06-19 | End: 2025-06-19 | Stop reason: ALTCHOICE

## 2025-06-19 RX ORDER — OXYCODONE HYDROCHLORIDE 10 MG/1
10 TABLET ORAL EVERY 4 HOURS PRN
Status: DISCONTINUED | OUTPATIENT
Start: 2025-06-19 | End: 2025-06-20 | Stop reason: HOSPADM

## 2025-06-19 RX ORDER — ACETAMINOPHEN 500 MG
1000 TABLET ORAL ONCE
Status: COMPLETED | OUTPATIENT
Start: 2025-06-19 | End: 2025-06-19

## 2025-06-19 RX ORDER — SODIUM CHLORIDE 9 MG/ML
INJECTION, SOLUTION INTRAVENOUS PRN
Status: DISCONTINUED | OUTPATIENT
Start: 2025-06-19 | End: 2025-06-20 | Stop reason: HOSPADM

## 2025-06-19 RX ORDER — NALOXONE HYDROCHLORIDE 0.4 MG/ML
INJECTION, SOLUTION INTRAMUSCULAR; INTRAVENOUS; SUBCUTANEOUS PRN
Status: DISCONTINUED | OUTPATIENT
Start: 2025-06-19 | End: 2025-06-19 | Stop reason: HOSPADM

## 2025-06-19 RX ORDER — LISINOPRIL 5 MG/1
5 TABLET ORAL DAILY
Status: DISCONTINUED | OUTPATIENT
Start: 2025-06-19 | End: 2025-06-20 | Stop reason: HOSPADM

## 2025-06-19 RX ORDER — SODIUM CHLORIDE 0.9 % (FLUSH) 0.9 %
5-40 SYRINGE (ML) INJECTION PRN
Status: DISCONTINUED | OUTPATIENT
Start: 2025-06-19 | End: 2025-06-20 | Stop reason: HOSPADM

## 2025-06-19 RX ADMIN — METHOCARBAMOL 1000 MG: 100 INJECTION INTRAMUSCULAR; INTRAVENOUS at 13:47

## 2025-06-19 RX ADMIN — CEFAZOLIN 2000 MG: 1 INJECTION, POWDER, FOR SOLUTION INTRAMUSCULAR; INTRAVENOUS at 13:15

## 2025-06-19 RX ADMIN — HYDROMORPHONE HYDROCHLORIDE 0.5 MG: 1 INJECTION, SOLUTION INTRAMUSCULAR; INTRAVENOUS; SUBCUTANEOUS at 14:48

## 2025-06-19 RX ADMIN — OXYCODONE HYDROCHLORIDE 10 MG: 10 TABLET, FILM COATED, EXTENDED RELEASE ORAL at 11:58

## 2025-06-19 RX ADMIN — HYDROMORPHONE HYDROCHLORIDE 0.5 MG: 1 INJECTION, SOLUTION INTRAMUSCULAR; INTRAVENOUS; SUBCUTANEOUS at 13:39

## 2025-06-19 RX ADMIN — OXYCODONE HYDROCHLORIDE 10 MG: 10 TABLET ORAL at 16:14

## 2025-06-19 RX ADMIN — FENTANYL CITRATE 50 MCG: 0.05 INJECTION, SOLUTION INTRAMUSCULAR; INTRAVENOUS at 13:03

## 2025-06-19 RX ADMIN — ROCURONIUM BROMIDE 50 MG: 10 INJECTION, SOLUTION INTRAVENOUS at 13:08

## 2025-06-19 RX ADMIN — HYDROMORPHONE HYDROCHLORIDE 0.5 MG: 1 INJECTION, SOLUTION INTRAMUSCULAR; INTRAVENOUS; SUBCUTANEOUS at 14:29

## 2025-06-19 RX ADMIN — LIDOCAINE HYDROCHLORIDE 50 MG: 10 INJECTION, SOLUTION INFILTRATION; PERINEURAL at 13:03

## 2025-06-19 RX ADMIN — SODIUM CHLORIDE, SODIUM LACTATE, POTASSIUM CHLORIDE, AND CALCIUM CHLORIDE: 600; 310; 30; 20 INJECTION, SOLUTION INTRAVENOUS at 14:06

## 2025-06-19 RX ADMIN — DEXAMETHASONE SODIUM PHOSPHATE 10 MG: 10 INJECTION, SOLUTION INTRAMUSCULAR; INTRAVENOUS at 13:24

## 2025-06-19 RX ADMIN — PROPOFOL 50 MG: 10 INJECTION, EMULSION INTRAVENOUS at 14:08

## 2025-06-19 RX ADMIN — HYDROMORPHONE HYDROCHLORIDE 0.5 MG: 1 INJECTION, SOLUTION INTRAMUSCULAR; INTRAVENOUS; SUBCUTANEOUS at 15:08

## 2025-06-19 RX ADMIN — ACETAMINOPHEN 650 MG: 325 TABLET ORAL at 21:06

## 2025-06-19 RX ADMIN — ONDANSETRON 4 MG: 2 INJECTION INTRAMUSCULAR; INTRAVENOUS at 14:18

## 2025-06-19 RX ADMIN — FENTANYL CITRATE 50 MCG: 0.05 INJECTION, SOLUTION INTRAMUSCULAR; INTRAVENOUS at 13:00

## 2025-06-19 RX ADMIN — LABETALOL 20 MG/4 ML (5 MG/ML) INTRAVENOUS SYRINGE 2.5 MG: at 13:48

## 2025-06-19 RX ADMIN — PANTOPRAZOLE SODIUM 40 MG: 40 TABLET, DELAYED RELEASE ORAL at 17:01

## 2025-06-19 RX ADMIN — WATER 2000 MG: 1 INJECTION INTRAMUSCULAR; INTRAVENOUS; SUBCUTANEOUS at 21:06

## 2025-06-19 RX ADMIN — Medication 1000 UNITS: at 17:01

## 2025-06-19 RX ADMIN — FENTANYL CITRATE 50 MCG: 0.05 INJECTION, SOLUTION INTRAMUSCULAR; INTRAVENOUS at 13:35

## 2025-06-19 RX ADMIN — ACETAMINOPHEN 1000 MG: 500 TABLET ORAL at 11:58

## 2025-06-19 RX ADMIN — CELECOXIB 200 MG: 200 CAPSULE ORAL at 11:58

## 2025-06-19 RX ADMIN — SODIUM CHLORIDE, PRESERVATIVE FREE 10 ML: 5 INJECTION INTRAVENOUS at 21:07

## 2025-06-19 RX ADMIN — ASPIRIN 325 MG: 325 TABLET, COATED ORAL at 17:01

## 2025-06-19 RX ADMIN — EPHEDRINE SULFATE 10 MG: 5 INJECTION INTRAVENOUS at 14:28

## 2025-06-19 RX ADMIN — HYDROMORPHONE HYDROCHLORIDE 0.5 MG: 1 INJECTION, SOLUTION INTRAMUSCULAR; INTRAVENOUS; SUBCUTANEOUS at 15:15

## 2025-06-19 RX ADMIN — PROPOFOL 70 MG: 10 INJECTION, EMULSION INTRAVENOUS at 13:07

## 2025-06-19 RX ADMIN — HYDROMORPHONE HYDROCHLORIDE 0.5 MG: 1 INJECTION, SOLUTION INTRAMUSCULAR; INTRAVENOUS; SUBCUTANEOUS at 14:40

## 2025-06-19 RX ADMIN — SODIUM CHLORIDE, SODIUM LACTATE, POTASSIUM CHLORIDE, AND CALCIUM CHLORIDE: .6; .31; .03; .02 INJECTION, SOLUTION INTRAVENOUS at 11:51

## 2025-06-19 RX ADMIN — SUGAMMADEX 200 MG: 100 INJECTION, SOLUTION INTRAVENOUS at 14:15

## 2025-06-19 RX ADMIN — PROPOFOL 30 MG: 10 INJECTION, EMULSION INTRAVENOUS at 13:40

## 2025-06-19 RX ADMIN — SODIUM CHLORIDE, SODIUM LACTATE, POTASSIUM CHLORIDE, AND CALCIUM CHLORIDE: 600; 310; 30; 20 INJECTION, SOLUTION INTRAVENOUS at 13:00

## 2025-06-19 RX ADMIN — TRANEXAMIC ACID 1950 MG: 650 TABLET ORAL at 11:58

## 2025-06-19 RX ADMIN — LIDOCAINE HYDROCHLORIDE 50 MG: 10 INJECTION, SOLUTION INFILTRATION; PERINEURAL at 13:00

## 2025-06-19 RX ADMIN — FENTANYL CITRATE 50 MCG: 0.05 INJECTION, SOLUTION INTRAMUSCULAR; INTRAVENOUS at 13:46

## 2025-06-19 ASSESSMENT — PAIN - FUNCTIONAL ASSESSMENT
PAIN_FUNCTIONAL_ASSESSMENT: 0-10
PAIN_FUNCTIONAL_ASSESSMENT: NONE - DENIES PAIN

## 2025-06-19 ASSESSMENT — PAIN SCALES - GENERAL
PAINLEVEL_OUTOF10: 8
PAINLEVEL_OUTOF10: 6
PAINLEVEL_OUTOF10: 4
PAINLEVEL_OUTOF10: 10

## 2025-06-19 ASSESSMENT — PAIN SCALES - WONG BAKER: WONGBAKER_NUMERICALRESPONSE: NO HURT

## 2025-06-19 ASSESSMENT — PAIN DESCRIPTION - LOCATION: LOCATION: HIP

## 2025-06-19 ASSESSMENT — PAIN DESCRIPTION - ORIENTATION: ORIENTATION: LEFT

## 2025-06-19 ASSESSMENT — LIFESTYLE VARIABLES: SMOKING_STATUS: 0

## 2025-06-19 ASSESSMENT — PAIN DESCRIPTION - DESCRIPTORS: DESCRIPTORS: ACHING

## 2025-06-19 NOTE — PROGRESS NOTES
4 Eyes Skin Assessment     NAME:  Margot Go  YOB: 1945  MEDICAL RECORD NUMBER:  [id number]    The patient is being assessed for  Admission    I agree that at least one RN has performed a thorough Head to Toe Skin Assessment on the patient. ALL assessment sites listed below have been assessed.      Areas assessed by both nurses:    Head, Face, Ears, Shoulders, Back, Chest, Arms, Elbows, Hands, Sacrum. Buttock, Coccyx, Ischium, and Legs. Feet and Heels        Does the Patient have a Wound? No noted wound(s)       Can Prevention initiated by RN: No  Wound Care Orders initiated by RN: No    Pressure Injury (Stage 3,4, Unstageable, DTI, NWPT, and Complex wounds) if present, place Wound referral order by RN under : No    New Ostomies, if present place, Ostomy referral order under : No     Nurse 1 eSignature: Electronically signed by Alejandro Gann RN on 6/19/25 at 4:17 PM CDT    **SHARE this note so that the co-signing nurse can place an eSignature**    Nurse 2 eSignature: {Esignature:[id number]}

## 2025-06-19 NOTE — PLAN OF CARE
Patient instructed to use call light and wait for staff to avoid falling, when in pain, and it she needs any thing

## 2025-06-19 NOTE — OP NOTE
liner was snapped in place.      The lift hook for the [place] bed was placed around the proximal femur and the leg was externally rotated 120 degrees and the hip was extended and adducted.  Bent regan retractors were placed medial to the femoral neck and proximal to the greater trochanter.  The lift hook was raised and the lateral capsule was released off the medial surface of the greater trochanter and lateral neck.    A cookie cutter was used to remove metaphyseal bone.  A rat tail rasp was used to work the lateral bone.  The femoral canal was broached from a size 8 up to a size where the broach was axially and rotationally stable.  The anteversion of the broach was 10 degrees.  The canal was not reamed.  A calcar planer was used to remove minimal neck bone.  The trial neck and head were placed and the hip was reduced.  C-arm showed that the trial filled the canal well and that the leg lengths were even.  Offset was similar to the opposite hip.    The hip was stable when the leg was externally rotated 90 degrees, the hip extended 20 degrees and an anterior pull applied to the neck.      The hip was dislocated and the trial femoral component removed.  The actual stem was impacted to the same position and orientation as the trial.  The trunion was cleaned and the chosen head was impacted in place.  The hip was reduced.  The hip space was filled with diluted, warm betadine.  C-arm showed good position of the implants and no fractures.    The wound was checked for bleeding and pulse lavaged with antibiotic irrigation.  The capsule was closed with 0 vicryl running suture.  The tissues were injected with 40 cc of bupivacaine.  The fascia was closed with running 0 vicryl, the subcutaneous layer with 2-0 vicryl and the skin closed with running 3-0 vicryl and prineo.  A sterile dressing was placed.  The patient was awakened, extubated and transferred to recovery in stable condition.    ITERATIONS   Neck Length (mm) Offset:

## 2025-06-19 NOTE — ANESTHESIA PRE PROCEDURE
Department of Anesthesiology  Preprocedure Note       Name:  Margot Go   Age:  79 y.o.  :  1945                                          MRN:  [id number]         Date:  2025      Surgeon: Surgeon(s):  Malik Vogt MD    Procedure: Procedure(s):  LEFT HIP TOTAL ARTHROPLASTY ANTERIOR APPROACH    Medications prior to admission:   Prior to Admission medications    Medication Sig Start Date End Date Taking? Authorizing Provider   traMADol (ULTRAM) 50 MG tablet Take 1 tablet by mouth every 6 hours as needed for Pain. 25   Jona tMz MD   naloxone 4 MG/0.1ML LIQD nasal spray 1 spray by Nasal route as needed for Opioid Reversal 1 - 3 sprays as needed  Patient not taking: Reported on 6/3/2025 5/12/25   Ila Reis PA   methylPREDNISolone (MEDROL DOSEPACK) 4 MG tablet Take by mouth as directed on package  Patient not taking: Reported on 6/3/2025 4/23/25   Malik Vogt MD   lisinopril (PRINIVIL;ZESTRIL) 10 MG tablet TAKE 1 TABLET DAILY FOR HIGH BLOOD PRESSURE  Patient taking differently: Take 0.5 tablets by mouth daily TAKE 1 TABLET DAILY FOR HIGH BLOOD PRESSURE 10/14/24   Mary Finley MD   levothyroxine (SYNTHROID) 50 MCG tablet TAKE 1 TABLET DAILY FOR LOW THYROID  Patient taking differently: Take 1 tablet by mouth Daily TAKE 1 TABLET DAILY FOR LOW THYROID 24   Nery Nielson MD   pantoprazole (PROTONIX) 40 MG tablet TAKE 1 TABLET DAILY FOR STOMACH  Patient taking differently: Take 1 tablet by mouth daily TAKE 1 TABLET DAILY FOR STOMACH 24   Nery Nielson MD   Vitamin D (CHOLECALCIFEROL) 1000 UNITS CAPS capsule Take 1 capsule by mouth daily    ProviderJona MD       Current medications:    Current Facility-Administered Medications   Medication Dose Route Frequency Provider Last Rate Last Admin   • lactated ringers infusion   IntraVENous Continuous Rosalva Sharp MD       • ceFAZolin (ANCEF) 2,000 mg in sterile water 20 mL IV

## 2025-06-19 NOTE — CONSULTS
Referring Provider: Dr. Fenton  Reason for Consultation: Medical [facility]    Patient Care Team:  Mary Finley MD as PCP - General (Family Medicine)  Mary Finley MD as PCP - Empaneled Provider  Regan Kumar MD as Neurologist (Neurology)  Telly Carreno MD as Consulting Physician (Neurosurgery)    Chief complaint: Medical management for total hip arthroplasty    Subjective .     History of present illness: 79-year-old female with a past medical history of GERD, hypothyroidism, essential hypertension, and osteoarthritis who presented on 6/19/2025 for a left hip arthroplasty with Dr. Fenton.    The patient has a long history of hip pain resistant to conservative measures and after careful consultation as an outpatient with Dr. Fenton, they elected to proceed with total hip arthroplasty. They have no new postoperative complaints and are in the expected amount of postoperative pain.      REVIEW OF SYSTEMS:    CONSTITUTIONAL:  Negative for anorexia, chills, fevers, night sweats and weight loss  EYES:  negative for eye dryness, icterus and redness  HEENT:   negative for dental problems, epistaxis, facial trauma and thrush  RESPIRATORY:  negative for chest tightness, cough, dyspnea on exertion, pneumonia and sputum  CARDIOVASCULAR: negative for chest pain, dyspnea, exertional chest pressure/discomfort, irregular heart beat, palpitations, paroxysmal nocturnal dyspnea and syncope  GASTROINTESTINAL:  negative for abdominal pain, hematemesis, jaundice, melena and rectal bleeding  MUSCULOSKELETAL:  negative for muscle weakness, myalgias and neck pain, chronic joint pain noted  NEUROLOGICAL:   negative for dizziness, headaches, seizures, speech problems, tremors and vertigo  INTEGUMENT: negative for pruritus, rash, skin color change and skin lesion(s)   A Full 14 point review of systems is negative outside those listed above and in the HPI      History    Past Medical History:   Diagnosis Date    Arthritis

## 2025-06-19 NOTE — ANESTHESIA POSTPROCEDURE EVALUATION
Department of Anesthesiology  Postprocedure Note    Patient: Margot Go  MRN: [id number]  YOB: 1945  Date of evaluation: 6/19/2025    Procedure Summary       Date: 06/19/25 Room / Location: [address]    Anesthesia Start: 1300 Anesthesia Stop: 1452    Procedure: LEFT HIP TOTAL ARTHROPLASTY ANTERIOR APPROACH (Left) Diagnosis:       Primary osteoarthritis of left hip      (Primary osteoarthritis of left hip [M16.12])    Surgeons: Malik Vogt MD Responsible Provider: Tomas Bonilla APRN - CRNA    Anesthesia Type: spinal, TIVA ASA Status: 2            Anesthesia Type: No value filed.    Diana Phase I: Diana Score: 7    Diana Phase II:      Anesthesia Post Evaluation    Patient location during evaluation: PACU  Patient participation: complete - patient participated  Level of consciousness: awake and alert  Pain score: 0  Airway patency: patent  Nausea & Vomiting: no nausea and no vomiting  Cardiovascular status: hemodynamically stable  Respiratory status: spontaneous ventilation and nonlabored ventilation  Hydration status: stable  Comments: See MAR for Pain management  Multimodal analgesia pain management approach    No notable events documented.

## 2025-06-19 NOTE — H&P
[facility] Pre-Operative History and Physical    Patient Name: Herman  : 1945        Chief Complaint: Left hip pain  History of Present Illness:   This patient has had ongoing pain for several weeks/months that has been unresponsive to conservative care which has included injection, therapy, activity modification and presents now for surgery.  Pain is deep in the groin buttock extending to the knee at times.  Pain is a severe ache that is worse with walking, standing and getting up and down.  Pain is somewhat improved with over the counter medication.      Past Medical History:       Diagnosis Date    Arthritis     Back pain     GERD (gastroesophageal reflux disease)     Hypertension     Hypothyroidism     thyroid nodule    Neuropathy     Xerosis of skin      Past Surgical History:       Procedure Laterality Date    CHOLECYSTECTOMY      COLONOSCOPY          TUBAL LIGATION         Medications:   Prior to Admission medications    Medication Sig Start Date End Date Taking? Authorizing Provider   traMADol (ULTRAM) 50 MG tablet Take 1 tablet by mouth every 6 hours as needed for Pain. 25   Provider, MD Jona   naloxone 4 MG/0.1ML LIQD nasal spray 1 spray by Nasal route as needed for Opioid Reversal 1 - 3 sprays as needed  Patient not taking: Reported on 6/3/2025 5/12/25   Ila Reis PA   methylPREDNISolone (MEDROL DOSEPACK) 4 MG tablet Take by mouth as directed on package  Patient not taking: Reported on 6/3/2025 4/23/25   Malik Vogt MD   lisinopril (PRINIVIL;ZESTRIL) 10 MG tablet TAKE 1 TABLET DAILY FOR HIGH BLOOD PRESSURE  Patient taking differently: Take 0.5 tablets by mouth daily TAKE 1 TABLET DAILY FOR HIGH BLOOD PRESSURE 10/14/24   Mary Finley MD   levothyroxine (SYNTHROID) 50 MCG tablet TAKE 1 TABLET DAILY FOR LOW THYROID  Patient taking differently: Take 1 tablet by mouth Daily TAKE 1 TABLET DAILY FOR LOW THYROID 24   Nery Nielson MD

## 2025-06-20 VITALS
OXYGEN SATURATION: 99 % | HEIGHT: 70 IN | HEART RATE: 73 BPM | SYSTOLIC BLOOD PRESSURE: 128 MMHG | TEMPERATURE: 97 F | DIASTOLIC BLOOD PRESSURE: 43 MMHG | RESPIRATION RATE: 18 BRPM | BODY MASS INDEX: 29.49 KG/M2 | WEIGHT: 206 LBS

## 2025-06-20 LAB
ANION GAP SERPL CALCULATED.3IONS-SCNC: 9 MMOL/L (ref 8–16)
BACTERIA URNS QL MICRO: NEGATIVE /HPF
BILIRUB UR QL STRIP: NEGATIVE
BUN SERPL-MCNC: 20 MG/DL (ref 8–23)
CALCIUM SERPL-MCNC: 8.9 MG/DL (ref 8.8–10.2)
CHLORIDE SERPL-SCNC: 102 MMOL/L (ref 98–107)
CLARITY UR: CLEAR
CO2 SERPL-SCNC: 25 MMOL/L (ref 22–29)
COLOR UR: YELLOW
CREAT SERPL-MCNC: 1 MG/DL (ref 0.5–0.9)
CRYSTALS URNS MICRO: NORMAL /HPF
EPI CELLS #/AREA URNS AUTO: 1 /HPF (ref 0–5)
FOLATE SERPL-MCNC: 8.5 NG/ML (ref 4.8–37.3)
GLUCOSE BLD-MCNC: 142 MG/DL (ref 70–99)
GLUCOSE BLD-MCNC: 168 MG/DL (ref 70–99)
GLUCOSE SERPL-MCNC: 163 MG/DL (ref 70–99)
GLUCOSE UR STRIP.AUTO-MCNC: 250 MG/DL
HCT VFR BLD AUTO: 33.8 % (ref 37–47)
HGB BLD-MCNC: 11.4 G/DL (ref 12–16)
HGB UR STRIP.AUTO-MCNC: NEGATIVE MG/L
HYALINE CASTS #/AREA URNS AUTO: 2 /HPF (ref 0–8)
IRON SATN MFR SERPL: 20 % (ref 15–50)
IRON SERPL-MCNC: 48 UG/DL (ref 37–145)
KETONES UR STRIP.AUTO-MCNC: NEGATIVE MG/DL
LEUKOCYTE ESTERASE UR QL STRIP.AUTO: ABNORMAL
NITRITE UR QL STRIP.AUTO: NEGATIVE
PERFORMED ON: ABNORMAL
PERFORMED ON: ABNORMAL
PH UR STRIP.AUTO: 5.5 [PH] (ref 5–8)
POTASSIUM SERPL-SCNC: 4.8 MMOL/L (ref 3.5–5)
PROT UR STRIP.AUTO-MCNC: NEGATIVE MG/DL
RBC #/AREA URNS AUTO: 1 /HPF (ref 0–4)
SODIUM SERPL-SCNC: 136 MMOL/L (ref 136–145)
SP GR UR STRIP.AUTO: 1.02 (ref 1–1.03)
TIBC SERPL-MCNC: 235 UG/DL (ref 250–400)
UROBILINOGEN UR STRIP.AUTO-MCNC: 1 E.U./DL
VIT B12 SERPL-MCNC: 844 PG/ML (ref 232–1245)
WBC #/AREA URNS AUTO: 3 /HPF (ref 0–5)

## 2025-06-20 PROCEDURE — 83540 ASSAY OF IRON: CPT

## 2025-06-20 PROCEDURE — 2500000003 HC RX 250 WO HCPCS: Performed by: ORTHOPAEDIC SURGERY

## 2025-06-20 PROCEDURE — 82746 ASSAY OF FOLIC ACID SERUM: CPT

## 2025-06-20 PROCEDURE — 97165 OT EVAL LOW COMPLEX 30 MIN: CPT

## 2025-06-20 PROCEDURE — 97530 THERAPEUTIC ACTIVITIES: CPT

## 2025-06-20 PROCEDURE — 97535 SELF CARE MNGMENT TRAINING: CPT

## 2025-06-20 PROCEDURE — 85018 HEMOGLOBIN: CPT

## 2025-06-20 PROCEDURE — G0378 HOSPITAL OBSERVATION PER HR: HCPCS

## 2025-06-20 PROCEDURE — 36415 COLL VENOUS BLD VENIPUNCTURE: CPT

## 2025-06-20 PROCEDURE — 81001 URINALYSIS AUTO W/SCOPE: CPT

## 2025-06-20 PROCEDURE — 94760 N-INVAS EAR/PLS OXIMETRY 1: CPT

## 2025-06-20 PROCEDURE — 6370000000 HC RX 637 (ALT 250 FOR IP)

## 2025-06-20 PROCEDURE — 6370000000 HC RX 637 (ALT 250 FOR IP): Performed by: ORTHOPAEDIC SURGERY

## 2025-06-20 PROCEDURE — 85014 HEMATOCRIT: CPT

## 2025-06-20 PROCEDURE — 80048 BASIC METABOLIC PNL TOTAL CA: CPT

## 2025-06-20 PROCEDURE — 6360000002 HC RX W HCPCS: Performed by: ORTHOPAEDIC SURGERY

## 2025-06-20 PROCEDURE — 97161 PT EVAL LOW COMPLEX 20 MIN: CPT

## 2025-06-20 PROCEDURE — 83550 IRON BINDING TEST: CPT

## 2025-06-20 PROCEDURE — 97116 GAIT TRAINING THERAPY: CPT

## 2025-06-20 PROCEDURE — 94150 VITAL CAPACITY TEST: CPT

## 2025-06-20 PROCEDURE — 82607 VITAMIN B-12: CPT

## 2025-06-20 PROCEDURE — 82962 GLUCOSE BLOOD TEST: CPT

## 2025-06-20 RX ORDER — ASPIRIN 81 MG/1
81 TABLET, CHEWABLE ORAL 2 TIMES DAILY
Qty: 60 TABLET | Refills: 0 | Status: SHIPPED | OUTPATIENT
Start: 2025-06-20

## 2025-06-20 RX ORDER — INSULIN LISPRO 100 [IU]/ML
0-4 INJECTION, SOLUTION INTRAVENOUS; SUBCUTANEOUS
Status: DISCONTINUED | OUTPATIENT
Start: 2025-06-20 | End: 2025-06-20 | Stop reason: HOSPADM

## 2025-06-20 RX ORDER — DEXTROSE MONOHYDRATE 100 MG/ML
INJECTION, SOLUTION INTRAVENOUS CONTINUOUS PRN
Status: DISCONTINUED | OUTPATIENT
Start: 2025-06-20 | End: 2025-06-20 | Stop reason: HOSPADM

## 2025-06-20 RX ORDER — DOXYCYCLINE HYCLATE 100 MG
100 TABLET ORAL 2 TIMES DAILY
Qty: 14 TABLET | Refills: 0 | Status: SHIPPED | OUTPATIENT
Start: 2025-06-20 | End: 2025-06-27

## 2025-06-20 RX ORDER — GLUCAGON 1 MG/ML
1 KIT INJECTION PRN
Status: DISCONTINUED | OUTPATIENT
Start: 2025-06-20 | End: 2025-06-20 | Stop reason: HOSPADM

## 2025-06-20 RX ORDER — OXYCODONE HYDROCHLORIDE 5 MG/1
5 TABLET ORAL EVERY 4 HOURS PRN
Qty: 40 TABLET | Refills: 0 | Status: SHIPPED | OUTPATIENT
Start: 2025-06-20 | End: 2025-06-30

## 2025-06-20 RX ADMIN — POLYETHYLENE GLYCOL 3350 17 G: 17 POWDER, FOR SOLUTION ORAL at 07:43

## 2025-06-20 RX ADMIN — SODIUM CHLORIDE, PRESERVATIVE FREE 10 ML: 5 INJECTION INTRAVENOUS at 07:44

## 2025-06-20 RX ADMIN — PANTOPRAZOLE SODIUM 40 MG: 40 TABLET, DELAYED RELEASE ORAL at 07:43

## 2025-06-20 RX ADMIN — LEVOTHYROXINE SODIUM 50 MCG: 0.05 TABLET ORAL at 07:43

## 2025-06-20 RX ADMIN — OXYCODONE 5 MG: 5 TABLET ORAL at 13:45

## 2025-06-20 RX ADMIN — OXYCODONE 5 MG: 5 TABLET ORAL at 07:43

## 2025-06-20 RX ADMIN — OXYCODONE 5 MG: 5 TABLET ORAL at 01:53

## 2025-06-20 RX ADMIN — ASPIRIN 325 MG: 325 TABLET, COATED ORAL at 07:43

## 2025-06-20 RX ADMIN — ACETAMINOPHEN 650 MG: 325 TABLET ORAL at 01:52

## 2025-06-20 RX ADMIN — Medication 1000 UNITS: at 07:43

## 2025-06-20 RX ADMIN — WATER 2000 MG: 1 INJECTION INTRAMUSCULAR; INTRAVENOUS; SUBCUTANEOUS at 05:04

## 2025-06-20 ASSESSMENT — PAIN DESCRIPTION - ORIENTATION
ORIENTATION: RIGHT
ORIENTATION: MID
ORIENTATION: RIGHT

## 2025-06-20 ASSESSMENT — PAIN DESCRIPTION - LOCATION
LOCATION: HIP
LOCATION: HIP
LOCATION: BACK

## 2025-06-20 ASSESSMENT — PAIN - FUNCTIONAL ASSESSMENT
PAIN_FUNCTIONAL_ASSESSMENT: ACTIVITIES ARE NOT PREVENTED
PAIN_FUNCTIONAL_ASSESSMENT: PREVENTS OR INTERFERES SOME ACTIVE ACTIVITIES AND ADLS
PAIN_FUNCTIONAL_ASSESSMENT: ACTIVITIES ARE NOT PREVENTED

## 2025-06-20 ASSESSMENT — PAIN SCALES - GENERAL
PAINLEVEL_OUTOF10: 5
PAINLEVEL_OUTOF10: 8
PAINLEVEL_OUTOF10: 2

## 2025-06-20 ASSESSMENT — PAIN DESCRIPTION - DESCRIPTORS
DESCRIPTORS: ACHING;DISCOMFORT

## 2025-06-20 ASSESSMENT — PAIN DESCRIPTION - PAIN TYPE: TYPE: ACUTE PAIN

## 2025-06-20 ASSESSMENT — PAIN DESCRIPTION - FREQUENCY: FREQUENCY: CONTINUOUS

## 2025-06-20 NOTE — DISCHARGE INSTR - DIET

## 2025-06-20 NOTE — DISCHARGE INSTRUCTIONS
[facility] [place] [facility]  Dr. Lakhwinder Vogt      Total Hip & Bipolar Replacement  Home Instructions     To prevent blood clots, you have been placed on the following medication:  Aspirin 81 mg twice a day for four weeks    Surgical Site Care:  Showering is permitted on post op day 2 - Saturday  No submersion in a bath, swimming pool, whirlpool, etc     Home physical therapy 2 days a week and a once a week nurse will be arranged before you get home    Weight Bearing Status:  Full unless you were told otherwise  Use a walker    Precautions  Don't walk for exercise (The longer you are on your feet the more sore you will be)  Stay close to home  You may go for short car rides    Pain Medications  You were given a prescription to fill at your pharmacy  Wean off pain medications as you deem appropriate as long as pain is under control  Take tylenol instead of the pain medicine as you improve                                                                                                                           FEVER of 101.5 or less  Please take a stool softener such as Colace to prevent constipation                   Tylenol x 2                                                                                                                                       Deep breath x 10  Do not drive until the surgeon releases you                                                          Cough, cough, cough  DO NOT SMOKE, VAPE OR CHEW!!!                                                                  Recheck in 1 - 11/2 hours    Cold packs  May be used as necessary  Be sure to have a barrier (cloth, clothing, towel) between the site and the ice pack to prevent frostbite    Contact office if  Increased redness, swelling, drainage of any kind, and/or severe pain at surgery site.  As well as new onset fevers and or chills.  These could signify an infection.  Calf tenderness to touch as well as increased swelling or

## 2025-06-20 NOTE — PROGRESS NOTES
Spiritual Health History and Assessment/Progress Note  [facility]    (P) Advance Care Planning,  ,  ,      Name: Margot Go MRN: [id number]    Age: 79 y.o.     Sex: female   Language: English   [place]: [employer]   Primary osteoarthritis of left hip     Date: 6/20/2025            Total Time Calculated: (P) 30 min              Spiritual Assessment began in [facility] 5 SURG SERVICES        Referral/Consult From: (P) Rounding   Encounter Overview/Reason: (P) Advance Care Planning  Service Provided For: (P) Patient    Robyn, Belief, Meaning:   Patient identifies as spiritual  Family/Friends No family/friends present      Importance and Influence:  Patient has spiritual/personal beliefs that influence decisions regarding their health, her robyn in God is important   Family/Friends No family/friends present    Community:  Patient is connected with a spiritual community and feels well-supported. Support system includes: Spouse/Partner, Children, and Robyn Community, [employer]  Family/Friends No family/friends present    Assessment and Plan of Care:   The patient was peaceful and hope. Her son is very supportive.  Patient Interventions include: Facilitated expression of thoughts and feelings, Explored spiritual coping/struggle/distress, Affirmed coping skills/support systems, and Assisted in Advance Care Planning conversation  Family/Friends Interventions include: No family/friends present    Patient Plan of Care: No future visits per patient/family request  Family/Friends Plan of Care: No family/friends present    Electronically signed by Chaplain Cortes Mai on 6/20/2025 at 5:07 PM

## 2025-06-20 NOTE — CARE COORDINATION
Patient will have [facility] upon discharge. This was set up by MD office prior to admission. Please fax DC Summary and AVS upon discharge.  [facility]   P  [phone] F  Electronically signed by Demetri Byrne RN, BSN on 6/20/2025 at 9:08 AM

## 2025-06-20 NOTE — PROGRESS NOTES
Pt has rested well through the night.  C/o pain occasionally with tylenol prn and other prn pain meds given prn.  Meds effective.  No needs voiced at this time.

## 2025-06-20 NOTE — DISCHARGE SUMMARY
[facility] [place] [facility]  Dr. Lakhwinder Vogt  Discharge Summary      The Margot Go is a 79 y.o. female underwent total hip replacement procedure without complication.  Margot Go was admitted to the floor following their recovery in the PACU.     Discharge Diagnosis  left Hip Replacement    Current Inpatient Medications    Current Facility-Administered Medications: insulin lispro (HUMALOG,ADMELOG) injection vial 0-4 Units, 0-4 Units, SubCUTAneous, 4x Daily AC & HS  glucose chewable tablet 16 g, 4 tablet, Oral, PRN  dextrose bolus 10% 125 mL, 125 mL, IntraVENous, PRN **OR** dextrose bolus 10% 250 mL, 250 mL, IntraVENous, PRN  glucagon injection 1 mg, 1 mg, SubCUTAneous, PRN  dextrose 10 % infusion, , IntraVENous, Continuous PRN  sodium chloride flush 0.9 % injection 5-40 mL, 5-40 mL, IntraVENous, 2 times per day  sodium chloride flush 0.9 % injection 5-40 mL, 5-40 mL, IntraVENous, PRN  0.9 % sodium chloride infusion, , IntraVENous, PRN  acetaminophen (TYLENOL) tablet 650 mg, 650 mg, Oral, Q6H  oxyCODONE (ROXICODONE) immediate release tablet 5 mg, 5 mg, Oral, Q4H PRN **OR** oxyCODONE HCl (OXY-IR) immediate release tablet 10 mg, 10 mg, Oral, Q4H PRN  morphine (PF) injection 2 mg, 2 mg, IntraVENous, Q2H PRN **OR** morphine sulfate (PF) injection 4 mg, 4 mg, IntraVENous, Q2H PRN  aspirin EC tablet 325 mg, 325 mg, Oral, BID  levothyroxine (SYNTHROID) tablet 50 mcg, 50 mcg, Oral, Daily  [Held by provider] lisinopril (PRINIVIL;ZESTRIL) tablet 5 mg, 5 mg, Oral, Daily  pantoprazole (PROTONIX) tablet 40 mg, 40 mg, Oral, Daily  Vitamin D (CHOLECALCIFEROL) tablet 1,000 Units, 1,000 Units, Oral, Daily  polyethylene glycol (GLYCOLAX) packet 17 g, 17 g, Oral, Daily    Post-operatively the patient’s diet was advanced as tolerated and their incision was checked on POD #1.  The incision is was clean, dry and intact with no signs of infection.  The patient remained neurovascularly intact in the lower extremity

## 2025-06-20 NOTE — PROGRESS NOTES
Patient discharged home today with [facility].  Debby notified of patient's discharge.  P .          Electronically signed by Radha Osorio RN on 6/20/2025 at 11:17 AM

## 2025-06-20 NOTE — PROGRESS NOTES
CLINICAL PHARMACY NOTE: MEDS TO BEDS    Total # of Prescriptions Filled: 3   The following medications were delivered to the patient:  Doxycycline hyclate 100mg  Oxycodone hcl 5mg   Aspirin low dose 81mg    Additional Documentation:   Copay paid with check   Delivered rx to patient bedside

## 2025-06-20 NOTE — PROGRESS NOTES
Occupational Therapy  Facility/Department: [facility] SURG SERVICES  Occupational Therapy Initial Assessment    Name: Margot Go  : 1945  MRN: [id number]  Date of Service: 2025    Discharge Recommendations:             Patient Diagnosis(es): The encounter diagnosis was S/P total left hip arthroplasty.  Past Medical History:  has a past medical history of Arthritis, Back pain, GERD (gastroesophageal reflux disease), Hypertension, Hypothyroidism, Neuropathy, and Xerosis of skin.  Past Surgical History:  has a past surgical history that includes Tubal ligation; Cholecystectomy; Colonoscopy; and Total hip arthroplasty (Left, 2025).    Treatment Diagnosis: s/p L THR      Assessment  Performance deficits / Impairments: Decreased functional mobility ;Decreased ADL status;Decreased balance  Assessment: Will progress as tolerated.  Will have other family members stay with her for 1-2 days.  Treatment Diagnosis: s/p L THR  Prognosis: Good  Decision Making: Low Complexity  Activity Tolerance  Activity Tolerance: Patient Tolerated treatment well     Plan  Occupational Therapy Plan  Times Per Week: 3-5x/week  Times Per Day: Once a day    Restrictions  Restrictions/Precautions  Restrictions/Precautions: Weight Bearing  Activity Level: Up with Assist  Lower Extremity Weight Bearing Restrictions  Left Lower Extremity Weight Bearing: Weight Bearing As Tolerated  Position Activity Restriction  Hip Precautions: Anterior hip precautions    Subjective  General  Chart Reviewed: Yes  Patient assessed for rehabilitation services?: Yes  Family / Caregiver Present: Yes  Diagnosis: s/p L THR  General Comment  Comments: Pt. ready to get up OOB.  Hopes to go home today with family     Social/Functional History  Social/Functional History  Lives With: Spouse, Other (Comment) (Spouse has mild dementia but takes care of himself.  Grandson also there but he works so not there all the time.)  Type of Home: Apartment  Home Layout: One

## 2025-06-20 NOTE — PROGRESS NOTES
Physical Therapy  Facility/Department: [facility] SURG SERVICES  Physical Therapy Initial Assessment    Name: Margot Go  : 1945  MRN: [id number]  Date of Service: 2025    Discharge Recommendations:  Continue to assess pending progress, 24 hour supervision or assist          Patient Diagnosis(es): The encounter diagnosis was S/P total left hip arthroplasty.  Past Medical History:  has a past medical history of Arthritis, Back pain, GERD (gastroesophageal reflux disease), Hypertension, Hypothyroidism, Neuropathy, and Xerosis of skin.  Past Surgical History:  has a past surgical history that includes Tubal ligation; Cholecystectomy; Colonoscopy; and Total hip arthroplasty (Left, 2025).    Assessment  Body Structures, Functions, Activity Limitations Requiring Skilled Therapeutic Intervention: Decreased functional mobility ;Decreased ADL status;Decreased ROM;Decreased strength;Decreased endurance;Decreased balance;Increased pain  Assessment: Pt ABLE TO AMB TO BR THEN RECLINER WITH CLOSE ASSIST. WILL PROGRESS WITH GT AS TOLERATED.  Requires PT Follow-Up: Yes  Activity Tolerance  Activity Tolerance: Patient tolerated treatment well    Plan  Physical Therapy Plan  General Plan: 5-7 times per week  Current Treatment Recommendations: Strengthening, ROM, Balance training, Functional mobility training, Transfer training, Gait training, Stair training, Safety education & training, Patient/Caregiver education & training  Safety Devices  Type of Devices: Call light within reach, Chair alarm in place, Heels elevated for pressure relief    Restrictions  Restrictions/Precautions  Restrictions/Precautions: Fall Risk, Weight Bearing  Activity Level: Up with Assist  Lower Extremity Weight Bearing Restrictions  Left Lower Extremity Weight Bearing: Weight Bearing As Tolerated  Position Activity Restriction  Hip Precautions: Anterior hip precautions     Subjective  General  Patient assessed for rehabilitation services?:

## 2025-06-20 NOTE — ANESTHESIA POSTPROCEDURE EVALUATION
Department of Anesthesiology  Postprocedure Note    Patient: Margot Go  MRN: [id number]  YOB: 1945  Date of evaluation: 6/20/2025    Procedure Summary       Date: 06/19/25 Room / Location: [address]    Anesthesia Start: 1300 Anesthesia Stop: 1452    Procedure: LEFT HIP TOTAL ARTHROPLASTY ANTERIOR APPROACH (Left) Diagnosis:       Primary osteoarthritis of left hip      (Primary osteoarthritis of left hip [M16.12])    Surgeons: Malik Vogt MD Responsible Provider: Tomas Bonilla APRN - CRNA    Anesthesia Type: spinal, TIVA ASA Status: 2            Anesthesia Type: No value filed.    Diana Phase I: Diana Score: 10    Diana Phase II:      Anesthesia Post Evaluation    Patient location during evaluation: floor  Patient participation: complete - patient participated  Level of consciousness: awake and alert  Pain score: 4  Airway patency: patent  Nausea & Vomiting: no nausea and no vomiting  Cardiovascular status: hemodynamically stable  Respiratory status: acceptable  Hydration status: stable  Pain management: adequate    No notable events documented.

## 2025-06-20 NOTE — PROGRESS NOTES
Subjective:     Post-Operative Day: 1 difficulty voiding had to be in and out cath.  Back hurts.  Been laying in bed for 3 hours without moving.    Objective:     Patient Vitals for the past 24 hrs:   BP Temp Temp src Pulse Resp SpO2 Height Weight   06/20/25 0435 137/63 98.1 °F (36.7 °C) Temporal 83 16 97 % -- --   06/20/25 0223 -- -- -- -- 20 -- -- --   06/19/25 2302 132/62 97.2 °F (36.2 °C) Temporal 86 16 98 % -- --   06/19/25 2059 134/75 97.5 °F (36.4 °C) Temporal 90 16 97 % -- --   06/19/25 2019 -- -- -- -- -- 99 % -- --   06/19/25 1937 120/67 97.3 °F (36.3 °C) Temporal 88 16 96 % -- --   06/19/25 1721 (!) 143/61 97.5 °F (36.4 °C) Temporal 76 16 96 % -- --   06/19/25 1645 (!) 140/68 97.3 °F (36.3 °C) Temporal 73 -- 93 % -- --   06/19/25 1644 -- -- -- -- 20 -- -- --   06/19/25 1600 (!) 140/92 (!) 96.4 °F (35.8 °C) Temporal 66 16 95 % -- --   06/19/25 1535 (!) 163/76 -- -- 67 12 100 % -- --   06/19/25 1530 (!) 156/83 -- -- 70 12 98 % -- --   06/19/25 1525 (!) 150/89 -- -- 69 12 100 % -- --   06/19/25 1520 (!) 113/102 -- -- 72 10 100 % -- --   06/19/25 1515 (!) 160/74 -- -- 70 12 100 % -- --   06/19/25 1508 (!) 149/73 -- -- 70 13 100 % -- --   06/19/25 1502 (!) 154/74 -- -- 72 12 100 % -- --   06/19/25 1457 (!) 152/74 97.3 °F (36.3 °C) -- 73 13 100 % -- --   06/19/25 1452 -- 97.3 °F (36.3 °C) -- -- -- -- -- --   06/19/25 1447 (!) 146/70 97 °F (36.1 °C) -- 77 12 100 % -- --   06/19/25 1148 (!) 175/74 97.7 °F (36.5 °C) Temporal 71 18 95 % 1.765 m (5' 9.5\") 93.4 kg (206 lb)       General: Alert cooperative   Wound: Clean dry intact.  Moderate swelling   Neurovascular: Exam normal   DVT Exam: negative         Data Review:  Recent Labs     06/20/25  0442   HGB 11.4*     Recent Labs     06/20/25  0442      K 4.8   CREATININE 1.0*   GLUCOSE 163*     No results for input(s): \"POCGLU\" in the last 72 hours.  XR HIP 2-3 VW W PELVIS LEFT   Final Result       Postsurgical changes of left hip arthroplasty.

## 2025-06-20 NOTE — PROGRESS NOTES
Physical Therapy  Name: Margot Go  MRN:  [id number]  Date of service:  6/20/2025 06/20/25 1406   Restrictions/Precautions   Restrictions/Precautions Fall Risk;Weight Bearing   Activity Level Up with Assist   Lower Extremity Weight Bearing Restrictions   Left Lower Extremity Weight Bearing Weight Bearing As Tolerated   Position Activity Restriction   Hip Precautions Anterior hip precautions   Subjective   Subjective Pt agreed to therapy.   Pain Assessment   Pain Assessment 0-10   Pain Level 5   Pain Location Hip   Pain Orientation Right   Pain Descriptors Aching;Discomfort   Functional Pain Assessment Prevents or interferes some active activities and ADLs   Pain Type Acute pain   Pain Frequency Continuous   Non-Pharmaceutical Pain Intervention(s) Ambulation/Increased Activity;Repositioned;Rest   Response to Pain Intervention Patient satisfied   Bed Mobility   Supine to Sit Stand by assistance   Transfers   Sit to Stand Contact guard assistance   Stand to Sit Contact guard assistance   Ambulation   WB Status FWB   Ambulation   Device Rolling Walker   Assistance Contact guard assistance;Minimal assistance   Quality of Gait steady   Gait Deviations Decreased step length;Decreased step height   Distance 40'   Other Activities   Comment pt states she did not need to practice stairs   Short Term Goals   Time Frame for Short Term Goals 14 DAYS   Short Term Goal 1 BED MOB MOD IND   Short Term Goal 2 TRANSFERS SUPERVISION   Short Term Goal 3 ' RW SUPERVISION   Short Term Goal 4 UP/DOWN 3 STEPS CGA   Conditions Requiring Skilled Therapeutic Intervention   Body Structures, Functions, Activity Limitations Requiring Skilled Therapeutic Intervention Decreased functional mobility ;Decreased ADL status;Decreased ROM;Decreased strength;Decreased endurance;Decreased balance;Increased pain   Assessment Pt tolerated increased amb distance this session. Steady overall with RW having no LOB. Assisted up to chair with all

## 2025-06-20 NOTE — PROGRESS NOTES
Patient discharged home today with [facility].  Went over all discharge instructions and new medications with patient and provided a copy of all new medications to take home.  Patient verbalized understanding.  Patient is voiding with no difficulty.  Patient's pain medication is effective for pain relief.  Patient was stable upon discharge.  Electronically signed by Radha Osorio RN on 6/20/2025 at 11:18 AM

## 2025-06-20 NOTE — ACP (ADVANCE CARE PLANNING)
Advance Care Planning     Advance Care Planning Inpatient Note  [facility] Department    Today's Date: 6/20/2025  Unit: [facility] SURG SERVICES    Received request from rounding.  Upon review of chart and communication with care team, patient's decision making abilities are not in question.. Patient was/were present in the room during visit.    Goals of ACP Conversation:  Facilitate a discussion related to patient's goals of care as they align with the patient's values and beliefs.    Health Care Decision Makers:       Primary Decision Maker: Jacob Go - Child - [phone]  Summary:  Verified Healthcare Decision Maker     Advance Care Planning Documents (Patient Wishes):  None     Assessment:  This is a 79-year-old female patient. Patient stated that her  is suffering from Alzheimer's is not able to be decision maker. Patient named her only child, the son Jacob Go as PDM.     Interventions:  Encouraged ongoing ACP conversation with future decision makers and loved ones    Care Preferences Communicated:   No    Outcomes/Plan:  ACP Discussion: Completed    Electronically signed by Chaplain Cortes Mai on 6/20/2025 at 5:10 PM

## 2025-06-24 ENCOUNTER — TELEPHONE (OUTPATIENT)
Age: 80
End: 2025-06-24

## 2025-06-25 ENCOUNTER — TELEPHONE (OUTPATIENT)
Dept: INPATIENT UNIT | Age: 80
End: 2025-06-25

## 2025-06-25 DIAGNOSIS — M16.12 PRIMARY OSTEOARTHRITIS OF LEFT HIP: Primary | ICD-10-CM

## 2025-06-25 RX ORDER — OXYCODONE HYDROCHLORIDE 5 MG/1
5 TABLET ORAL EVERY 4 HOURS PRN
Qty: 40 TABLET | Refills: 0 | Status: SHIPPED | OUTPATIENT
Start: 2025-06-25 | End: 2025-07-05

## 2025-07-02 ENCOUNTER — TELEPHONE (OUTPATIENT)
Age: 80
End: 2025-07-02

## 2025-07-02 DIAGNOSIS — M16.12 PRIMARY OSTEOARTHRITIS OF LEFT HIP: ICD-10-CM

## 2025-07-02 RX ORDER — OXYCODONE HYDROCHLORIDE 5 MG/1
5 TABLET ORAL EVERY 4 HOURS PRN
Qty: 40 TABLET | Refills: 0 | Status: SHIPPED | OUTPATIENT
Start: 2025-07-02 | End: 2025-07-12

## 2025-07-14 ENCOUNTER — TELEPHONE (OUTPATIENT)
Age: 80
End: 2025-07-14

## 2025-07-14 NOTE — TELEPHONE ENCOUNTER
Called and spoke with jaylon and gave verbal about extension to continue pt for patient for 2 more weeks

## 2025-07-16 ENCOUNTER — OFFICE VISIT (OUTPATIENT)
Age: 80
End: 2025-07-16

## 2025-07-16 ENCOUNTER — TELEPHONE (OUTPATIENT)
Age: 80
End: 2025-07-16

## 2025-07-16 VITALS — WEIGHT: 206 LBS | HEIGHT: 69 IN | BODY MASS INDEX: 30.51 KG/M2

## 2025-07-16 DIAGNOSIS — M25.552 LEFT HIP PAIN: Primary | ICD-10-CM

## 2025-07-16 DIAGNOSIS — Z96.642 STATUS POST LEFT HIP REPLACEMENT: ICD-10-CM

## 2025-07-16 PROCEDURE — 99024 POSTOP FOLLOW-UP VISIT: CPT | Performed by: ORTHOPAEDIC SURGERY

## 2025-07-16 RX ORDER — TRAMADOL HYDROCHLORIDE 50 MG/1
TABLET ORAL
COMMUNITY
Start: 2025-07-09

## 2025-07-16 NOTE — TELEPHONE ENCOUNTER
Patient has asked to withhold all therapy this week due to her injuring her back but will continue next week

## 2025-07-16 NOTE — PROGRESS NOTES
[facility]  [facility]  [address]  [place] [address] [phone]         Margot Go (: 1945) is a 80 y.o. female, patient, here for evaluation of the following chief complaint(s): Post-Op Check (Post op - surgery 2025 left hip)  .         Patient's PCP: Mary Finley MD     Patient's Last Appointment in this Department was on 2025      Subjective:     Chief Complaint   Patient presents with    Post-Op Check     Post op - surgery 2025 left hip        History of Present Illness  The patient presents for evaluation of her hip.    She reports a satisfactory recovery from her hip surgery, with no current pain in the area. She experienced a back strain two days ago while bending over to  a package. This incident did not affect her hip but has resulted in significant soreness on the right side of her back. She used a walker for mobility today due to the discomfort. She has been managing her pain with tramadol and occasionally Tylenol, which she finds helpful. She is considering the use of ibuprofen for its anti-inflammatory properties but is concerned about its potential to elevate her blood pressure. She has been receiving rehabilitation services at home but is unsure if she wishes to continue this.              Medications  Current Outpatient Medications   Medication Sig Dispense Refill    traMADol (ULTRAM) 50 MG tablet       aspirin (ASPIRIN CHILDRENS) 81 MG chewable tablet Take 1 tablet by mouth in the morning and at bedtime 60 tablet 0    lisinopril (PRINIVIL;ZESTRIL) 10 MG tablet TAKE 1 TABLET DAILY FOR HIGH BLOOD PRESSURE 90 tablet 3    levothyroxine (SYNTHROID) 50 MCG tablet TAKE 1 TABLET DAILY FOR LOW THYROID 90 tablet 3    pantoprazole (PROTONIX) 40 MG tablet TAKE 1 TABLET DAILY FOR STOMACH 90 tablet 3    Vitamin D (CHOLECALCIFEROL) 1000 UNITS CAPS capsule Take 1 capsule by mouth daily

## 2025-07-23 ENCOUNTER — TELEPHONE (OUTPATIENT)
Age: 80
End: 2025-07-23

## 2025-07-23 NOTE — TELEPHONE ENCOUNTER
Aida said that the patient was discharged from  and Home Health today, she has met all her goals.

## (undated) DEVICE — SUTURE VICRYL + 3-0 L27IN ABSRB UD PS-2 L19MM 3/8 CIR PRIM VCP427H

## (undated) DEVICE — NEPTUNE E-SEP SMOKE EVACUATION PENCIL, COATED, 70MM BLADE, ROCKER SWITCH: Brand: NEPTUNE E-SEP

## (undated) DEVICE — BAND BAG 36" X 36": Brand: TIDI

## (undated) DEVICE — GLOVE SURG SZ 85 L12IN FNGR ORTHO 126MIL CRM LTX FREE

## (undated) DEVICE — SUTURE VICRYL + SZ 0 L27IN ABSRB UD CT-1 L36MM 1/2 CIR TAPR VCP260H

## (undated) DEVICE — Device

## (undated) DEVICE — HANDPIECE SET WITH COAXIAL MULTI-ORIFICE TIP AND SUCTION TUBE: Brand: INTERPULSE

## (undated) DEVICE — SUTURE PERMAHAND SZ 0 L30IN NONABSORBABLE BLK SILK BRAID A306H

## (undated) DEVICE — SUTURE VICRYL + SZ 2-0 L36IN ABSRB UD L36MM CT-1 1/2 CIR VCP945H

## (undated) DEVICE — CURAVIEW LED LARYNGOSCOPE BLADE & HANDLE,DISPOSABLE,MAC 2: Brand: CURAPLEX

## (undated) DEVICE — TUBE ET 7MM NSL ORAL BASIC CUF INTMED MURPHY EYE RADPQ MRK

## (undated) DEVICE — DUAL CUT SAGITTAL BLADE

## (undated) DEVICE — GLOVE SURG SZ 85 L12IN FNGR THK79MIL GRN LTX FREE

## (undated) DEVICE — LIGHT SUCT UNTETHERED SCINTILLANT

## (undated) DEVICE — SOLUTION IRRIG 3000ML 0.9% SOD CHL USP UROMATIC PLAS CONT

## (undated) DEVICE — GLOVE SURG SZ 85 CRM LTX FREE POLYISOPRENE POLYMER BEAD ANTI

## (undated) DEVICE — SYSTEM SKIN CLSR 22CM DERMBND PRINEO